# Patient Record
Sex: FEMALE | Race: BLACK OR AFRICAN AMERICAN | Employment: OTHER | URBAN - METROPOLITAN AREA
[De-identification: names, ages, dates, MRNs, and addresses within clinical notes are randomized per-mention and may not be internally consistent; named-entity substitution may affect disease eponyms.]

---

## 2017-03-29 ENCOUNTER — HOSPITAL ENCOUNTER (OUTPATIENT)
Age: 72
Discharge: HOME | End: 2017-03-29
Payer: MEDICARE

## 2017-03-29 LAB
ANION GAP SERPL CALCULATED.3IONS-SCNC: 11 MMOL/L (ref 9–17)
BUN BLDV-MCNC: 28 MG/DL (ref 8–23)
BUN/CREAT BLD: 15 (ref 9–20)
CALCIUM SERPL-MCNC: 9.7 MG/DL (ref 8.6–10.4)
CHLORIDE BLD-SCNC: 99 MMOL/L (ref 98–107)
CO2: 27 MMOL/L (ref 20–31)
CREAT SERPL-MCNC: 1.86 MG/DL (ref 0.5–0.9)
GFR AFRICAN AMERICAN: 32 ML/MIN
GFR NON-AFRICAN AMERICAN: 27 ML/MIN
GFR SERPL CREATININE-BSD FRML MDRD: ABNORMAL ML/MIN/{1.73_M2}
GFR SERPL CREATININE-BSD FRML MDRD: ABNORMAL ML/MIN/{1.73_M2}
GLUCOSE BLD-MCNC: 198 MG/DL (ref 70–99)
POTASSIUM SERPL-SCNC: 4.8 MMOL/L (ref 3.7–5.3)
SODIUM BLD-SCNC: 137 MMOL/L (ref 135–144)

## 2017-03-29 PROCEDURE — 36415 COLL VENOUS BLD VENIPUNCTURE: CPT

## 2017-03-29 PROCEDURE — 80048 BASIC METABOLIC PNL TOTAL CA: CPT

## 2017-07-24 ENCOUNTER — HOSPITAL ENCOUNTER (OUTPATIENT)
Age: 72
Discharge: HOME | End: 2017-07-24
Payer: MEDICARE

## 2017-07-24 LAB
ANION GAP SERPL CALCULATED.3IONS-SCNC: 16 MMOL/L (ref 9–17)
BUN BLDV-MCNC: 54 MG/DL (ref 8–23)
BUN/CREAT BLD: 27 (ref 9–20)
CALCIUM SERPL-MCNC: 10.3 MG/DL (ref 8.6–10.4)
CHLORIDE BLD-SCNC: 97 MMOL/L (ref 98–107)
CO2: 26 MMOL/L (ref 20–31)
CREAT SERPL-MCNC: 1.97 MG/DL (ref 0.5–0.9)
GFR AFRICAN AMERICAN: 30 ML/MIN
GFR NON-AFRICAN AMERICAN: 25 ML/MIN
GFR SERPL CREATININE-BSD FRML MDRD: ABNORMAL ML/MIN/{1.73_M2}
GFR SERPL CREATININE-BSD FRML MDRD: ABNORMAL ML/MIN/{1.73_M2}
GLUCOSE BLD-MCNC: 352 MG/DL (ref 70–99)
POTASSIUM SERPL-SCNC: 4.3 MMOL/L (ref 3.7–5.3)
SODIUM BLD-SCNC: 139 MMOL/L (ref 135–144)

## 2017-07-24 PROCEDURE — 36415 COLL VENOUS BLD VENIPUNCTURE: CPT

## 2017-07-24 PROCEDURE — 80048 BASIC METABOLIC PNL TOTAL CA: CPT

## 2017-08-17 ENCOUNTER — HOSPITAL ENCOUNTER (INPATIENT)
Age: 72
LOS: 5 days | Discharge: HOME | DRG: 602 | End: 2017-08-22
Attending: INTERNAL MEDICINE | Admitting: INTERNAL MEDICINE
Payer: MEDICARE

## 2017-08-17 PROBLEM — L03.115 CELLULITIS OF RIGHT LOWER LEG: Status: ACTIVE | Noted: 2017-08-17

## 2017-08-17 PROBLEM — R60.0 BILATERAL EDEMA OF LOWER EXTREMITY: Status: ACTIVE | Noted: 2017-08-17

## 2017-08-17 PROBLEM — E11.42 DIABETIC POLYNEUROPATHY ASSOCIATED WITH TYPE 2 DIABETES MELLITUS (HCC): Status: ACTIVE | Noted: 2017-08-17

## 2017-08-17 PROBLEM — L97.919 ULCER OF RIGHT LOWER LEG (HCC): Status: ACTIVE | Noted: 2017-08-17

## 2017-08-17 PROBLEM — N18.30 CHRONIC RENAL FAILURE, STAGE 3 (MODERATE) (HCC): Status: ACTIVE | Noted: 2017-08-17

## 2017-08-17 LAB
ABSOLUTE EOS #: 0.1 K/UL (ref 0–0.4)
ABSOLUTE LYMPH #: 1.7 K/UL (ref 1–4.8)
ABSOLUTE MONO #: 0.4 K/UL (ref 0.2–0.8)
ALBUMIN SERPL-MCNC: 4 G/DL (ref 3.5–5.2)
ANION GAP SERPL CALCULATED.3IONS-SCNC: 18 MMOL/L (ref 9–17)
BASOPHILS # BLD: 1 %
BASOPHILS ABSOLUTE: 0 K/UL (ref 0–0.2)
BNP INTERPRETATION: ABNORMAL
BUN BLDV-MCNC: 52 MG/DL (ref 8–23)
CHLORIDE BLD-SCNC: 99 MMOL/L (ref 98–107)
CO2: 25 MMOL/L (ref 20–31)
CREAT SERPL-MCNC: 1.98 MG/DL (ref 0.5–0.9)
DIFFERENTIAL TYPE: ABNORMAL
EOSINOPHILS RELATIVE PERCENT: 3 %
GFR AFRICAN AMERICAN: 30 ML/MIN
GFR NON-AFRICAN AMERICAN: 25 ML/MIN
GFR SERPL CREATININE-BSD FRML MDRD: ABNORMAL ML/MIN/{1.73_M2}
GFR SERPL CREATININE-BSD FRML MDRD: ABNORMAL ML/MIN/{1.73_M2}
GLUCOSE BLD-MCNC: 109 MG/DL (ref 65–105)
HCT VFR BLD CALC: 34.3 % (ref 36–46)
HEMOGLOBIN: 11.6 G/DL (ref 12–16)
LYMPHOCYTES # BLD: 31 %
MCH RBC QN AUTO: 29.5 PG (ref 26–34)
MCHC RBC AUTO-ENTMCNC: 33.8 G/DL (ref 31–37)
MCV RBC AUTO: 87.1 FL (ref 80–100)
MONOCYTES # BLD: 7 %
PDW BLD-RTO: 13.7 % (ref 11.5–14.5)
PLATELET # BLD: 142 K/UL (ref 130–400)
PLATELET ESTIMATE: ABNORMAL
PMV BLD AUTO: 8.3 FL (ref 6–12)
POTASSIUM SERPL-SCNC: 3.7 MMOL/L (ref 3.7–5.3)
PRO-BNP: 528 PG/ML
RBC # BLD: 3.94 M/UL (ref 4–5.2)
RBC # BLD: ABNORMAL 10*6/UL
SEG NEUTROPHILS: 58 %
SEGMENTED NEUTROPHILS ABSOLUTE COUNT: 3.2 K/UL (ref 1.8–7.7)
SODIUM BLD-SCNC: 142 MMOL/L (ref 135–144)
THYROXINE, FREE: 1.22 NG/DL (ref 0.93–1.7)
TSH SERPL DL<=0.05 MIU/L-ACNC: 2.5 MIU/L (ref 0.3–5)
WBC # BLD: 5.4 K/UL (ref 3.5–11)
WBC # BLD: ABNORMAL 10*3/UL

## 2017-08-17 PROCEDURE — 6360000002 HC RX W HCPCS: Performed by: INTERNAL MEDICINE

## 2017-08-17 PROCEDURE — 82947 ASSAY GLUCOSE BLOOD QUANT: CPT

## 2017-08-17 PROCEDURE — 36415 COLL VENOUS BLD VENIPUNCTURE: CPT

## 2017-08-17 PROCEDURE — 2580000003 HC RX 258: Performed by: INTERNAL MEDICINE

## 2017-08-17 PROCEDURE — 6370000000 HC RX 637 (ALT 250 FOR IP): Performed by: INTERNAL MEDICINE

## 2017-08-17 PROCEDURE — 87070 CULTURE OTHR SPECIMN AEROBIC: CPT

## 2017-08-17 PROCEDURE — 82040 ASSAY OF SERUM ALBUMIN: CPT

## 2017-08-17 PROCEDURE — 2500000003 HC RX 250 WO HCPCS: Performed by: INTERNAL MEDICINE

## 2017-08-17 PROCEDURE — 80051 ELECTROLYTE PANEL: CPT

## 2017-08-17 PROCEDURE — 87040 BLOOD CULTURE FOR BACTERIA: CPT

## 2017-08-17 PROCEDURE — 84439 ASSAY OF FREE THYROXINE: CPT

## 2017-08-17 PROCEDURE — 84520 ASSAY OF UREA NITROGEN: CPT

## 2017-08-17 PROCEDURE — 85025 COMPLETE CBC W/AUTO DIFF WBC: CPT

## 2017-08-17 PROCEDURE — 2060000000 HC ICU INTERMEDIATE R&B

## 2017-08-17 PROCEDURE — 83880 ASSAY OF NATRIURETIC PEPTIDE: CPT

## 2017-08-17 PROCEDURE — 84443 ASSAY THYROID STIM HORMONE: CPT

## 2017-08-17 PROCEDURE — 87205 SMEAR GRAM STAIN: CPT

## 2017-08-17 PROCEDURE — 83036 HEMOGLOBIN GLYCOSYLATED A1C: CPT

## 2017-08-17 PROCEDURE — 82565 ASSAY OF CREATININE: CPT

## 2017-08-17 RX ORDER — ASPIRIN 81 MG/1
81 TABLET, CHEWABLE ORAL DAILY
Status: DISCONTINUED | OUTPATIENT
Start: 2017-08-18 | End: 2017-08-23 | Stop reason: HOSPADM

## 2017-08-17 RX ORDER — OMEPRAZOLE 20 MG/1
40 CAPSULE, DELAYED RELEASE ORAL DAILY
Status: DISCONTINUED | OUTPATIENT
Start: 2017-08-18 | End: 2017-08-17 | Stop reason: CLARIF

## 2017-08-17 RX ORDER — BUMETANIDE 0.25 MG/ML
1 INJECTION, SOLUTION INTRAMUSCULAR; INTRAVENOUS 2 TIMES DAILY
Status: DISCONTINUED | OUTPATIENT
Start: 2017-08-17 | End: 2017-08-19

## 2017-08-17 RX ORDER — POTASSIUM CHLORIDE 20 MEQ/1
20 TABLET, EXTENDED RELEASE ORAL DAILY
Status: ON HOLD | COMMUNITY
End: 2017-08-22 | Stop reason: HOSPADM

## 2017-08-17 RX ORDER — CARVEDILOL 6.25 MG/1
6.25 TABLET ORAL 2 TIMES DAILY WITH MEALS
Status: DISCONTINUED | OUTPATIENT
Start: 2017-08-18 | End: 2017-08-23 | Stop reason: HOSPADM

## 2017-08-17 RX ORDER — BUMETANIDE 1 MG/1
2 TABLET ORAL DAILY
Status: ON HOLD | COMMUNITY
End: 2017-08-18 | Stop reason: DRUGHIGH

## 2017-08-17 RX ORDER — LANOLIN ALCOHOL/MO/W.PET/CERES
325 CREAM (GRAM) TOPICAL
Status: DISCONTINUED | OUTPATIENT
Start: 2017-08-18 | End: 2017-08-23 | Stop reason: HOSPADM

## 2017-08-17 RX ORDER — NICOTINE POLACRILEX 4 MG
15 LOZENGE BUCCAL PRN
Status: DISCONTINUED | OUTPATIENT
Start: 2017-08-17 | End: 2017-08-23 | Stop reason: HOSPADM

## 2017-08-17 RX ORDER — SPIRONOLACTONE 25 MG/1
50 TABLET ORAL DAILY
Status: DISCONTINUED | OUTPATIENT
Start: 2017-08-18 | End: 2017-08-23 | Stop reason: HOSPADM

## 2017-08-17 RX ORDER — HYDRALAZINE HYDROCHLORIDE 50 MG/1
50 TABLET, FILM COATED ORAL 2 TIMES DAILY
Status: DISCONTINUED | OUTPATIENT
Start: 2017-08-17 | End: 2017-08-23 | Stop reason: HOSPADM

## 2017-08-17 RX ORDER — HYDRALAZINE HYDROCHLORIDE 50 MG/1
50 TABLET, FILM COATED ORAL 2 TIMES DAILY
Status: ON HOLD | COMMUNITY
End: 2017-08-18 | Stop reason: DRUGHIGH

## 2017-08-17 RX ORDER — ISOSORBIDE MONONITRATE 30 MG/1
30 TABLET, EXTENDED RELEASE ORAL DAILY
COMMUNITY

## 2017-08-17 RX ORDER — GLIPIZIDE 2.5 MG/1
2.5 TABLET, EXTENDED RELEASE ORAL DAILY
Status: ON HOLD | COMMUNITY
End: 2017-08-18 | Stop reason: CLARIF

## 2017-08-17 RX ORDER — DEXTROSE MONOHYDRATE 25 G/50ML
12.5 INJECTION, SOLUTION INTRAVENOUS PRN
Status: DISCONTINUED | OUTPATIENT
Start: 2017-08-17 | End: 2017-08-23 | Stop reason: HOSPADM

## 2017-08-17 RX ORDER — DULOXETIN HYDROCHLORIDE 30 MG/1
30 CAPSULE, DELAYED RELEASE ORAL DAILY
COMMUNITY

## 2017-08-17 RX ORDER — DEXTROSE MONOHYDRATE 50 MG/ML
100 INJECTION, SOLUTION INTRAVENOUS PRN
Status: DISCONTINUED | OUTPATIENT
Start: 2017-08-17 | End: 2017-08-23 | Stop reason: HOSPADM

## 2017-08-17 RX ORDER — DULOXETIN HYDROCHLORIDE 30 MG/1
30 CAPSULE, DELAYED RELEASE ORAL DAILY
Status: DISCONTINUED | OUTPATIENT
Start: 2017-08-18 | End: 2017-08-23 | Stop reason: HOSPADM

## 2017-08-17 RX ORDER — ATORVASTATIN CALCIUM 40 MG/1
40 TABLET, FILM COATED ORAL NIGHTLY
Status: DISCONTINUED | OUTPATIENT
Start: 2017-08-17 | End: 2017-08-23 | Stop reason: HOSPADM

## 2017-08-17 RX ORDER — ISOSORBIDE MONONITRATE 30 MG/1
30 TABLET, EXTENDED RELEASE ORAL DAILY
Status: DISCONTINUED | OUTPATIENT
Start: 2017-08-18 | End: 2017-08-23 | Stop reason: HOSPADM

## 2017-08-17 RX ORDER — INSULIN GLARGINE 100 [IU]/ML
30 INJECTION, SOLUTION SUBCUTANEOUS
Status: DISCONTINUED | OUTPATIENT
Start: 2017-08-18 | End: 2017-08-23 | Stop reason: HOSPADM

## 2017-08-17 RX ORDER — HYDROCODONE BITARTRATE AND ACETAMINOPHEN 5; 325 MG/1; MG/1
1 TABLET ORAL EVERY 8 HOURS PRN
Status: DISCONTINUED | OUTPATIENT
Start: 2017-08-17 | End: 2017-08-23 | Stop reason: HOSPADM

## 2017-08-17 RX ORDER — PANTOPRAZOLE SODIUM 40 MG/1
40 TABLET, DELAYED RELEASE ORAL
Status: DISCONTINUED | OUTPATIENT
Start: 2017-08-18 | End: 2017-08-23 | Stop reason: HOSPADM

## 2017-08-17 RX ORDER — ATORVASTATIN CALCIUM 40 MG/1
40 TABLET, FILM COATED ORAL DAILY
COMMUNITY
End: 2019-12-06

## 2017-08-17 RX ORDER — METOLAZONE 5 MG/1
5 TABLET ORAL DAILY
Status: ON HOLD | COMMUNITY
End: 2017-08-22 | Stop reason: HOSPADM

## 2017-08-17 RX ADMIN — HYDRALAZINE HYDROCHLORIDE 50 MG: 50 TABLET ORAL at 22:44

## 2017-08-17 RX ADMIN — BUMETANIDE 1 MG: 0.25 INJECTION INTRAMUSCULAR; INTRAVENOUS at 23:45

## 2017-08-17 RX ADMIN — HYDROCODONE BITARTRATE AND ACETAMINOPHEN 1 TABLET: 5; 325 TABLET ORAL at 23:35

## 2017-08-17 RX ADMIN — VANCOMYCIN HYDROCHLORIDE 1500 MG: 1 INJECTION, POWDER, LYOPHILIZED, FOR SOLUTION INTRAVENOUS at 23:52

## 2017-08-17 RX ADMIN — ATORVASTATIN CALCIUM 40 MG: 40 TABLET, FILM COATED ORAL at 22:45

## 2017-08-17 ASSESSMENT — PAIN DESCRIPTION - PAIN TYPE
TYPE: ACUTE PAIN
TYPE: ACUTE PAIN

## 2017-08-17 ASSESSMENT — PAIN DESCRIPTION - FREQUENCY: FREQUENCY: CONTINUOUS

## 2017-08-17 ASSESSMENT — PAIN DESCRIPTION - ORIENTATION
ORIENTATION: RIGHT;LOWER
ORIENTATION: RIGHT

## 2017-08-17 ASSESSMENT — PAIN DESCRIPTION - LOCATION
LOCATION: ANKLE
LOCATION: ANKLE;LEG

## 2017-08-17 ASSESSMENT — PAIN SCALES - GENERAL
PAINLEVEL_OUTOF10: 7
PAINLEVEL_OUTOF10: 7

## 2017-08-18 PROBLEM — I50.32 CHRONIC DIASTOLIC HEART FAILURE (HCC): Status: ACTIVE | Noted: 2017-08-18

## 2017-08-18 LAB
ANION GAP SERPL CALCULATED.3IONS-SCNC: 13 MMOL/L (ref 9–17)
BUN BLDV-MCNC: 51 MG/DL (ref 8–23)
CHLORIDE BLD-SCNC: 99 MMOL/L (ref 98–107)
CO2: 30 MMOL/L (ref 20–31)
CREAT SERPL-MCNC: 1.98 MG/DL (ref 0.5–0.9)
CREATININE URINE: 92.9 MG/DL (ref 28–217)
ESTIMATED AVERAGE GLUCOSE: 235 MG/DL
GFR AFRICAN AMERICAN: 30 ML/MIN
GFR NON-AFRICAN AMERICAN: 25 ML/MIN
GFR SERPL CREATININE-BSD FRML MDRD: ABNORMAL ML/MIN/{1.73_M2}
GFR SERPL CREATININE-BSD FRML MDRD: ABNORMAL ML/MIN/{1.73_M2}
GLUCOSE BLD-MCNC: 170 MG/DL (ref 65–105)
GLUCOSE BLD-MCNC: 185 MG/DL (ref 65–105)
GLUCOSE BLD-MCNC: 187 MG/DL (ref 65–105)
GLUCOSE BLD-MCNC: 212 MG/DL (ref 65–105)
HBA1C MFR BLD: 9.8 % (ref 4–6)
LV EF: 60 %
LVEF MODALITY: NORMAL
MICROALBUMIN/CREAT 24H UR: 26 MG/L
MICROALBUMIN/CREAT UR-RTO: 28 MCG/MG CREAT
POTASSIUM SERPL-SCNC: 3.9 MMOL/L (ref 3.7–5.3)
SODIUM BLD-SCNC: 142 MMOL/L (ref 135–144)

## 2017-08-18 PROCEDURE — 36415 COLL VENOUS BLD VENIPUNCTURE: CPT

## 2017-08-18 PROCEDURE — 82565 ASSAY OF CREATININE: CPT

## 2017-08-18 PROCEDURE — 80051 ELECTROLYTE PANEL: CPT

## 2017-08-18 PROCEDURE — 99211 OFF/OP EST MAY X REQ PHY/QHP: CPT

## 2017-08-18 PROCEDURE — 93970 EXTREMITY STUDY: CPT

## 2017-08-18 PROCEDURE — 2060000000 HC ICU INTERMEDIATE R&B

## 2017-08-18 PROCEDURE — 6370000000 HC RX 637 (ALT 250 FOR IP): Performed by: INTERNAL MEDICINE

## 2017-08-18 PROCEDURE — 82043 UR ALBUMIN QUANTITATIVE: CPT

## 2017-08-18 PROCEDURE — 82570 ASSAY OF URINE CREATININE: CPT

## 2017-08-18 PROCEDURE — 82947 ASSAY GLUCOSE BLOOD QUANT: CPT

## 2017-08-18 PROCEDURE — 2580000003 HC RX 258: Performed by: INTERNAL MEDICINE

## 2017-08-18 PROCEDURE — 2500000003 HC RX 250 WO HCPCS: Performed by: INTERNAL MEDICINE

## 2017-08-18 PROCEDURE — 93306 TTE W/DOPPLER COMPLETE: CPT

## 2017-08-18 PROCEDURE — 6360000002 HC RX W HCPCS: Performed by: INTERNAL MEDICINE

## 2017-08-18 PROCEDURE — 84520 ASSAY OF UREA NITROGEN: CPT

## 2017-08-18 RX ORDER — CARVEDILOL 12.5 MG/1
12.5 TABLET ORAL 2 TIMES DAILY
Status: ON HOLD | COMMUNITY
End: 2018-10-30 | Stop reason: HOSPADM

## 2017-08-18 RX ORDER — GABAPENTIN 100 MG/1
100 CAPSULE ORAL 3 TIMES DAILY
Status: DISCONTINUED | OUTPATIENT
Start: 2017-08-18 | End: 2017-08-23 | Stop reason: HOSPADM

## 2017-08-18 RX ORDER — BUMETANIDE 2 MG/1
2 TABLET ORAL DAILY
Status: ON HOLD | COMMUNITY
End: 2017-08-22

## 2017-08-18 RX ORDER — GLIPIZIDE 5 MG/1
5 TABLET ORAL DAILY
COMMUNITY

## 2017-08-18 RX ORDER — HYDRALAZINE HYDROCHLORIDE 100 MG/1
100 TABLET, FILM COATED ORAL 2 TIMES DAILY
Status: ON HOLD | COMMUNITY
End: 2018-02-12 | Stop reason: HOSPADM

## 2017-08-18 RX ADMIN — INSULIN GLARGINE 30 UNITS: 100 INJECTION, SOLUTION SUBCUTANEOUS at 09:04

## 2017-08-18 RX ADMIN — BUMETANIDE 1 MG: 0.25 INJECTION INTRAMUSCULAR; INTRAVENOUS at 10:57

## 2017-08-18 RX ADMIN — ASPIRIN 81 MG 81 MG: 81 TABLET ORAL at 10:57

## 2017-08-18 RX ADMIN — ISOSORBIDE MONONITRATE 30 MG: 30 TABLET ORAL at 10:55

## 2017-08-18 RX ADMIN — ATORVASTATIN CALCIUM 40 MG: 40 TABLET, FILM COATED ORAL at 23:13

## 2017-08-18 RX ADMIN — HYDRALAZINE HYDROCHLORIDE 50 MG: 50 TABLET ORAL at 23:13

## 2017-08-18 RX ADMIN — HYDRALAZINE HYDROCHLORIDE 50 MG: 50 TABLET ORAL at 10:55

## 2017-08-18 RX ADMIN — FERROUS SULFATE TAB EC 325 MG (65 MG FE EQUIVALENT) 325 MG: 325 (65 FE) TABLET DELAYED RESPONSE at 10:57

## 2017-08-18 RX ADMIN — HYDROCODONE BITARTRATE AND ACETAMINOPHEN 1 TABLET: 5; 325 TABLET ORAL at 11:12

## 2017-08-18 RX ADMIN — SPIRONOLACTONE 50 MG: 25 TABLET, FILM COATED ORAL at 10:56

## 2017-08-18 RX ADMIN — CARVEDILOL 6.25 MG: 6.25 TABLET, FILM COATED ORAL at 10:56

## 2017-08-18 RX ADMIN — VANCOMYCIN HYDROCHLORIDE 1500 MG: 1 INJECTION, POWDER, LYOPHILIZED, FOR SOLUTION INTRAVENOUS at 23:26

## 2017-08-18 RX ADMIN — BUMETANIDE 1 MG: 0.25 INJECTION INTRAMUSCULAR; INTRAVENOUS at 23:13

## 2017-08-18 RX ADMIN — Medication 2 UNITS: at 09:04

## 2017-08-18 RX ADMIN — HYDROCODONE BITARTRATE AND ACETAMINOPHEN 1 TABLET: 5; 325 TABLET ORAL at 23:26

## 2017-08-18 RX ADMIN — DULOXETINE HYDROCHLORIDE 30 MG: 30 CAPSULE, DELAYED RELEASE ORAL at 10:55

## 2017-08-18 RX ADMIN — GABAPENTIN 100 MG: 100 CAPSULE ORAL at 17:10

## 2017-08-18 RX ADMIN — Medication 4 UNITS: at 17:12

## 2017-08-18 RX ADMIN — CARVEDILOL 6.25 MG: 6.25 TABLET, FILM COATED ORAL at 17:11

## 2017-08-18 RX ADMIN — GABAPENTIN 100 MG: 100 CAPSULE ORAL at 23:13

## 2017-08-18 RX ADMIN — INSULIN LISPRO 1 UNITS: 100 INJECTION, SOLUTION INTRAVENOUS; SUBCUTANEOUS at 23:13

## 2017-08-18 RX ADMIN — PANTOPRAZOLE SODIUM 40 MG: 40 TABLET, DELAYED RELEASE ORAL at 06:11

## 2017-08-18 ASSESSMENT — PAIN SCALES - GENERAL
PAINLEVEL_OUTOF10: 8
PAINLEVEL_OUTOF10: 8
PAINLEVEL_OUTOF10: 5
PAINLEVEL_OUTOF10: 3
PAINLEVEL_OUTOF10: 1

## 2017-08-19 LAB
ANION GAP SERPL CALCULATED.3IONS-SCNC: 15 MMOL/L (ref 9–17)
BUN BLDV-MCNC: 52 MG/DL (ref 8–23)
CHLORIDE BLD-SCNC: 97 MMOL/L (ref 98–107)
CO2: 27 MMOL/L (ref 20–31)
CREAT SERPL-MCNC: 2.24 MG/DL (ref 0.5–0.9)
GFR AFRICAN AMERICAN: 26 ML/MIN
GFR NON-AFRICAN AMERICAN: 22 ML/MIN
GFR SERPL CREATININE-BSD FRML MDRD: ABNORMAL ML/MIN/{1.73_M2}
GFR SERPL CREATININE-BSD FRML MDRD: ABNORMAL ML/MIN/{1.73_M2}
GLUCOSE BLD-MCNC: 144 MG/DL (ref 65–105)
GLUCOSE BLD-MCNC: 177 MG/DL (ref 65–105)
GLUCOSE BLD-MCNC: 236 MG/DL (ref 65–105)
GLUCOSE BLD-MCNC: 80 MG/DL (ref 65–105)
POTASSIUM SERPL-SCNC: 3.7 MMOL/L (ref 3.7–5.3)
SODIUM BLD-SCNC: 139 MMOL/L (ref 135–144)
VANCOMYCIN TROUGH DATE LAST DOSE: NORMAL
VANCOMYCIN TROUGH DOSE AMOUNT: NORMAL
VANCOMYCIN TROUGH TIME LAST DOSE: NORMAL
VANCOMYCIN TROUGH: 19.2 UG/ML (ref 10–20)

## 2017-08-19 PROCEDURE — 80202 ASSAY OF VANCOMYCIN: CPT

## 2017-08-19 PROCEDURE — 2500000003 HC RX 250 WO HCPCS: Performed by: INTERNAL MEDICINE

## 2017-08-19 PROCEDURE — 84520 ASSAY OF UREA NITROGEN: CPT

## 2017-08-19 PROCEDURE — 2060000000 HC ICU INTERMEDIATE R&B

## 2017-08-19 PROCEDURE — 6370000000 HC RX 637 (ALT 250 FOR IP): Performed by: INTERNAL MEDICINE

## 2017-08-19 PROCEDURE — 80051 ELECTROLYTE PANEL: CPT

## 2017-08-19 PROCEDURE — 82565 ASSAY OF CREATININE: CPT

## 2017-08-19 PROCEDURE — 82947 ASSAY GLUCOSE BLOOD QUANT: CPT

## 2017-08-19 PROCEDURE — 94762 N-INVAS EAR/PLS OXIMTRY CONT: CPT

## 2017-08-19 PROCEDURE — 36415 COLL VENOUS BLD VENIPUNCTURE: CPT

## 2017-08-19 RX ADMIN — FERROUS SULFATE TAB EC 325 MG (65 MG FE EQUIVALENT) 325 MG: 325 (65 FE) TABLET DELAYED RESPONSE at 10:17

## 2017-08-19 RX ADMIN — PANTOPRAZOLE SODIUM 40 MG: 40 TABLET, DELAYED RELEASE ORAL at 06:53

## 2017-08-19 RX ADMIN — CARVEDILOL 6.25 MG: 6.25 TABLET, FILM COATED ORAL at 10:18

## 2017-08-19 RX ADMIN — Medication 2 UNITS: at 10:19

## 2017-08-19 RX ADMIN — INSULIN LISPRO 2 UNITS: 100 INJECTION, SOLUTION INTRAVENOUS; SUBCUTANEOUS at 22:47

## 2017-08-19 RX ADMIN — HYDRALAZINE HYDROCHLORIDE 50 MG: 50 TABLET ORAL at 10:17

## 2017-08-19 RX ADMIN — Medication 2 UNITS: at 12:38

## 2017-08-19 RX ADMIN — SPIRONOLACTONE 50 MG: 25 TABLET, FILM COATED ORAL at 10:19

## 2017-08-19 RX ADMIN — ISOSORBIDE MONONITRATE 30 MG: 30 TABLET ORAL at 10:17

## 2017-08-19 RX ADMIN — BUMETANIDE 1 MG: 0.25 INJECTION INTRAMUSCULAR; INTRAVENOUS at 10:17

## 2017-08-19 RX ADMIN — GABAPENTIN 100 MG: 100 CAPSULE ORAL at 10:17

## 2017-08-19 RX ADMIN — GABAPENTIN 100 MG: 100 CAPSULE ORAL at 22:47

## 2017-08-19 RX ADMIN — INSULIN GLARGINE 30 UNITS: 100 INJECTION, SOLUTION SUBCUTANEOUS at 06:53

## 2017-08-19 RX ADMIN — ATORVASTATIN CALCIUM 40 MG: 40 TABLET, FILM COATED ORAL at 22:47

## 2017-08-19 RX ADMIN — CARVEDILOL 6.25 MG: 6.25 TABLET, FILM COATED ORAL at 16:55

## 2017-08-19 RX ADMIN — HYDRALAZINE HYDROCHLORIDE 50 MG: 50 TABLET ORAL at 22:47

## 2017-08-19 RX ADMIN — DULOXETINE HYDROCHLORIDE 30 MG: 30 CAPSULE, DELAYED RELEASE ORAL at 10:18

## 2017-08-19 RX ADMIN — HYDROCODONE BITARTRATE AND ACETAMINOPHEN 1 TABLET: 5; 325 TABLET ORAL at 22:47

## 2017-08-19 RX ADMIN — GABAPENTIN 100 MG: 100 CAPSULE ORAL at 14:22

## 2017-08-19 RX ADMIN — ASPIRIN 81 MG 81 MG: 81 TABLET ORAL at 10:18

## 2017-08-19 ASSESSMENT — PAIN SCALES - GENERAL
PAINLEVEL_OUTOF10: 6
PAINLEVEL_OUTOF10: 1
PAINLEVEL_OUTOF10: 7
PAINLEVEL_OUTOF10: 0
PAINLEVEL_OUTOF10: 1

## 2017-08-19 ASSESSMENT — PAIN DESCRIPTION - ONSET
ONSET: ON-GOING

## 2017-08-19 ASSESSMENT — ENCOUNTER SYMPTOMS
EYES NEGATIVE: 1
RESPIRATORY NEGATIVE: 1
GASTROINTESTINAL NEGATIVE: 1

## 2017-08-19 ASSESSMENT — PAIN DESCRIPTION - PROGRESSION
CLINICAL_PROGRESSION: NOT CHANGED

## 2017-08-19 ASSESSMENT — PAIN DESCRIPTION - ORIENTATION: ORIENTATION: RIGHT;LEFT

## 2017-08-19 ASSESSMENT — PAIN DESCRIPTION - LOCATION: LOCATION: LEG;FOOT

## 2017-08-19 ASSESSMENT — PAIN DESCRIPTION - DESCRIPTORS: DESCRIPTORS: ACHING;PRESSURE

## 2017-08-19 ASSESSMENT — PAIN DESCRIPTION - PAIN TYPE: TYPE: ACUTE PAIN;CHRONIC PAIN

## 2017-08-19 ASSESSMENT — PAIN DESCRIPTION - FREQUENCY: FREQUENCY: CONTINUOUS

## 2017-08-20 ENCOUNTER — APPOINTMENT (OUTPATIENT)
Dept: GENERAL RADIOLOGY | Age: 72
DRG: 602 | End: 2017-08-20
Attending: INTERNAL MEDICINE
Payer: MEDICARE

## 2017-08-20 LAB
ANION GAP SERPL CALCULATED.3IONS-SCNC: 13 MMOL/L (ref 9–17)
BUN BLDV-MCNC: 55 MG/DL (ref 8–23)
CHLORIDE BLD-SCNC: 96 MMOL/L (ref 98–107)
CO2: 29 MMOL/L (ref 20–31)
CREAT SERPL-MCNC: 2.16 MG/DL (ref 0.5–0.9)
CULTURE: ABNORMAL
DIRECT EXAM: ABNORMAL
DIRECT EXAM: ABNORMAL
GFR AFRICAN AMERICAN: 27 ML/MIN
GFR NON-AFRICAN AMERICAN: 22 ML/MIN
GFR SERPL CREATININE-BSD FRML MDRD: ABNORMAL ML/MIN/{1.73_M2}
GFR SERPL CREATININE-BSD FRML MDRD: ABNORMAL ML/MIN/{1.73_M2}
GLUCOSE BLD-MCNC: 101 MG/DL (ref 65–105)
GLUCOSE BLD-MCNC: 142 MG/DL (ref 65–105)
GLUCOSE BLD-MCNC: 161 MG/DL (ref 65–105)
GLUCOSE BLD-MCNC: 206 MG/DL (ref 65–105)
Lab: ABNORMAL
POTASSIUM SERPL-SCNC: 3.7 MMOL/L (ref 3.7–5.3)
SODIUM BLD-SCNC: 138 MMOL/L (ref 135–144)
SPECIMEN DESCRIPTION: ABNORMAL
SPECIMEN DESCRIPTION: ABNORMAL
STATUS: ABNORMAL

## 2017-08-20 PROCEDURE — 71010 XR CHEST PORTABLE: CPT

## 2017-08-20 PROCEDURE — 80051 ELECTROLYTE PANEL: CPT

## 2017-08-20 PROCEDURE — 2060000000 HC ICU INTERMEDIATE R&B

## 2017-08-20 PROCEDURE — 36415 COLL VENOUS BLD VENIPUNCTURE: CPT

## 2017-08-20 PROCEDURE — 6370000000 HC RX 637 (ALT 250 FOR IP): Performed by: INTERNAL MEDICINE

## 2017-08-20 PROCEDURE — 2580000003 HC RX 258: Performed by: INTERNAL MEDICINE

## 2017-08-20 PROCEDURE — 84520 ASSAY OF UREA NITROGEN: CPT

## 2017-08-20 PROCEDURE — 82565 ASSAY OF CREATININE: CPT

## 2017-08-20 PROCEDURE — 6360000002 HC RX W HCPCS: Performed by: INTERNAL MEDICINE

## 2017-08-20 PROCEDURE — 82947 ASSAY GLUCOSE BLOOD QUANT: CPT

## 2017-08-20 RX ADMIN — DULOXETINE HYDROCHLORIDE 30 MG: 30 CAPSULE, DELAYED RELEASE ORAL at 09:01

## 2017-08-20 RX ADMIN — HYDRALAZINE HYDROCHLORIDE 50 MG: 50 TABLET ORAL at 09:00

## 2017-08-20 RX ADMIN — HYDRALAZINE HYDROCHLORIDE 50 MG: 50 TABLET ORAL at 20:23

## 2017-08-20 RX ADMIN — GABAPENTIN 100 MG: 100 CAPSULE ORAL at 16:08

## 2017-08-20 RX ADMIN — GABAPENTIN 100 MG: 100 CAPSULE ORAL at 09:00

## 2017-08-20 RX ADMIN — GABAPENTIN 100 MG: 100 CAPSULE ORAL at 20:23

## 2017-08-20 RX ADMIN — INSULIN LISPRO 2 UNITS: 100 INJECTION, SOLUTION INTRAVENOUS; SUBCUTANEOUS at 23:54

## 2017-08-20 RX ADMIN — CARVEDILOL 6.25 MG: 6.25 TABLET, FILM COATED ORAL at 20:23

## 2017-08-20 RX ADMIN — INSULIN GLARGINE 30 UNITS: 100 INJECTION, SOLUTION SUBCUTANEOUS at 06:30

## 2017-08-20 RX ADMIN — VANCOMYCIN HYDROCHLORIDE 1500 MG: 1 INJECTION, POWDER, LYOPHILIZED, FOR SOLUTION INTRAVENOUS at 00:16

## 2017-08-20 RX ADMIN — ISOSORBIDE MONONITRATE 30 MG: 30 TABLET ORAL at 09:01

## 2017-08-20 RX ADMIN — PANTOPRAZOLE SODIUM 40 MG: 40 TABLET, DELAYED RELEASE ORAL at 06:30

## 2017-08-20 RX ADMIN — Medication 2 UNITS: at 09:05

## 2017-08-20 RX ADMIN — CARVEDILOL 6.25 MG: 6.25 TABLET, FILM COATED ORAL at 07:40

## 2017-08-20 RX ADMIN — ASPIRIN 81 MG 81 MG: 81 TABLET ORAL at 09:02

## 2017-08-20 RX ADMIN — ATORVASTATIN CALCIUM 40 MG: 40 TABLET, FILM COATED ORAL at 20:23

## 2017-08-20 RX ADMIN — FERROUS SULFATE TAB EC 325 MG (65 MG FE EQUIVALENT) 325 MG: 325 (65 FE) TABLET DELAYED RESPONSE at 07:40

## 2017-08-20 RX ADMIN — SPIRONOLACTONE 50 MG: 25 TABLET, FILM COATED ORAL at 09:01

## 2017-08-20 ASSESSMENT — PAIN SCALES - GENERAL
PAINLEVEL_OUTOF10: 0

## 2017-08-21 LAB
ABSOLUTE EOS #: 0.2 K/UL (ref 0–0.4)
ABSOLUTE LYMPH #: 1.9 K/UL (ref 1–4.8)
ABSOLUTE MONO #: 0.6 K/UL (ref 0.2–0.8)
ANION GAP SERPL CALCULATED.3IONS-SCNC: 12 MMOL/L (ref 9–17)
BASOPHILS # BLD: 1 %
BASOPHILS ABSOLUTE: 0 K/UL (ref 0–0.2)
BUN BLDV-MCNC: 54 MG/DL (ref 8–23)
CHLORIDE BLD-SCNC: 99 MMOL/L (ref 98–107)
CO2: 29 MMOL/L (ref 20–31)
CREAT SERPL-MCNC: 2.08 MG/DL (ref 0.5–0.9)
DIFFERENTIAL TYPE: ABNORMAL
EOSINOPHILS RELATIVE PERCENT: 3 %
GFR AFRICAN AMERICAN: 28 ML/MIN
GFR NON-AFRICAN AMERICAN: 23 ML/MIN
GFR SERPL CREATININE-BSD FRML MDRD: ABNORMAL ML/MIN/{1.73_M2}
GFR SERPL CREATININE-BSD FRML MDRD: ABNORMAL ML/MIN/{1.73_M2}
GLUCOSE BLD-MCNC: 117 MG/DL (ref 65–105)
GLUCOSE BLD-MCNC: 151 MG/DL (ref 65–105)
GLUCOSE BLD-MCNC: 165 MG/DL (ref 65–105)
GLUCOSE BLD-MCNC: 215 MG/DL (ref 65–105)
HCT VFR BLD CALC: 33.1 % (ref 36–46)
HEMOGLOBIN: 11.2 G/DL (ref 12–16)
LYMPHOCYTES # BLD: 34 %
MCH RBC QN AUTO: 29.7 PG (ref 26–34)
MCHC RBC AUTO-ENTMCNC: 33.8 G/DL (ref 31–37)
MCV RBC AUTO: 87.9 FL (ref 80–100)
MONOCYTES # BLD: 11 %
MRSA, DNA, NASAL: NORMAL
PDW BLD-RTO: 13.3 % (ref 11.5–14.5)
PLATELET # BLD: 143 K/UL (ref 130–400)
PLATELET ESTIMATE: ABNORMAL
PMV BLD AUTO: 8 FL (ref 6–12)
POTASSIUM SERPL-SCNC: 3.8 MMOL/L (ref 3.7–5.3)
RBC # BLD: 3.77 M/UL (ref 4–5.2)
RBC # BLD: ABNORMAL 10*6/UL
SEG NEUTROPHILS: 51 %
SEGMENTED NEUTROPHILS ABSOLUTE COUNT: 2.9 K/UL (ref 1.8–7.7)
SODIUM BLD-SCNC: 140 MMOL/L (ref 135–144)
SPECIMEN DESCRIPTION: NORMAL
WBC # BLD: 5.7 K/UL (ref 3.5–11)
WBC # BLD: ABNORMAL 10*3/UL

## 2017-08-21 PROCEDURE — 97535 SELF CARE MNGMENT TRAINING: CPT

## 2017-08-21 PROCEDURE — 82947 ASSAY GLUCOSE BLOOD QUANT: CPT

## 2017-08-21 PROCEDURE — 36415 COLL VENOUS BLD VENIPUNCTURE: CPT

## 2017-08-21 PROCEDURE — 94760 N-INVAS EAR/PLS OXIMETRY 1: CPT

## 2017-08-21 PROCEDURE — 82565 ASSAY OF CREATININE: CPT

## 2017-08-21 PROCEDURE — 2580000003 HC RX 258: Performed by: INTERNAL MEDICINE

## 2017-08-21 PROCEDURE — G8987 SELF CARE CURRENT STATUS: HCPCS

## 2017-08-21 PROCEDURE — G8988 SELF CARE GOAL STATUS: HCPCS

## 2017-08-21 PROCEDURE — 97165 OT EVAL LOW COMPLEX 30 MIN: CPT

## 2017-08-21 PROCEDURE — 94640 AIRWAY INHALATION TREATMENT: CPT

## 2017-08-21 PROCEDURE — 84520 ASSAY OF UREA NITROGEN: CPT

## 2017-08-21 PROCEDURE — 6370000000 HC RX 637 (ALT 250 FOR IP): Performed by: INTERNAL MEDICINE

## 2017-08-21 PROCEDURE — 6360000002 HC RX W HCPCS: Performed by: INTERNAL MEDICINE

## 2017-08-21 PROCEDURE — 87641 MR-STAPH DNA AMP PROBE: CPT

## 2017-08-21 PROCEDURE — 80051 ELECTROLYTE PANEL: CPT

## 2017-08-21 PROCEDURE — 85025 COMPLETE CBC W/AUTO DIFF WBC: CPT

## 2017-08-21 PROCEDURE — 2060000000 HC ICU INTERMEDIATE R&B

## 2017-08-21 RX ORDER — ALBUTEROL SULFATE 90 UG/1
2 AEROSOL, METERED RESPIRATORY (INHALATION) 4 TIMES DAILY
Status: DISCONTINUED | OUTPATIENT
Start: 2017-08-21 | End: 2017-08-23 | Stop reason: HOSPADM

## 2017-08-21 RX ORDER — SODIUM CHLORIDE 9 MG/ML
INJECTION, SOLUTION INTRAVENOUS CONTINUOUS
Status: DISCONTINUED | OUTPATIENT
Start: 2017-08-21 | End: 2017-08-22

## 2017-08-21 RX ADMIN — SODIUM CHLORIDE: 9 INJECTION, SOLUTION INTRAVENOUS at 09:37

## 2017-08-21 RX ADMIN — PANTOPRAZOLE SODIUM 40 MG: 40 TABLET, DELAYED RELEASE ORAL at 05:58

## 2017-08-21 RX ADMIN — IPRATROPIUM BROMIDE 2 PUFF: 17 AEROSOL, METERED RESPIRATORY (INHALATION) at 20:18

## 2017-08-21 RX ADMIN — Medication 2 UNITS: at 16:47

## 2017-08-21 RX ADMIN — CARVEDILOL 6.25 MG: 6.25 TABLET, FILM COATED ORAL at 09:33

## 2017-08-21 RX ADMIN — ISOSORBIDE MONONITRATE 30 MG: 30 TABLET ORAL at 09:33

## 2017-08-21 RX ADMIN — SPIRONOLACTONE 50 MG: 25 TABLET, FILM COATED ORAL at 09:33

## 2017-08-21 RX ADMIN — GABAPENTIN 100 MG: 100 CAPSULE ORAL at 21:38

## 2017-08-21 RX ADMIN — HYDRALAZINE HYDROCHLORIDE 50 MG: 50 TABLET ORAL at 21:38

## 2017-08-21 RX ADMIN — ALBUTEROL SULFATE 2 PUFF: 90 AEROSOL, METERED RESPIRATORY (INHALATION) at 20:18

## 2017-08-21 RX ADMIN — ATORVASTATIN CALCIUM 40 MG: 40 TABLET, FILM COATED ORAL at 21:38

## 2017-08-21 RX ADMIN — VANCOMYCIN HYDROCHLORIDE 1500 MG: 1 INJECTION, POWDER, LYOPHILIZED, FOR SOLUTION INTRAVENOUS at 00:03

## 2017-08-21 RX ADMIN — DULOXETINE HYDROCHLORIDE 30 MG: 30 CAPSULE, DELAYED RELEASE ORAL at 09:33

## 2017-08-21 RX ADMIN — CARVEDILOL 6.25 MG: 6.25 TABLET, FILM COATED ORAL at 16:48

## 2017-08-21 RX ADMIN — FERROUS SULFATE TAB EC 325 MG (65 MG FE EQUIVALENT) 325 MG: 325 (65 FE) TABLET DELAYED RESPONSE at 09:33

## 2017-08-21 RX ADMIN — Medication 2 UNITS: at 12:52

## 2017-08-21 RX ADMIN — HYDRALAZINE HYDROCHLORIDE 50 MG: 50 TABLET ORAL at 09:33

## 2017-08-21 RX ADMIN — GABAPENTIN 100 MG: 100 CAPSULE ORAL at 13:27

## 2017-08-21 RX ADMIN — ASPIRIN 81 MG 81 MG: 81 TABLET ORAL at 09:34

## 2017-08-21 RX ADMIN — GABAPENTIN 100 MG: 100 CAPSULE ORAL at 09:33

## 2017-08-21 RX ADMIN — INSULIN GLARGINE 30 UNITS: 100 INJECTION, SOLUTION SUBCUTANEOUS at 05:58

## 2017-08-21 RX ADMIN — INSULIN LISPRO 2 UNITS: 100 INJECTION, SOLUTION INTRAVENOUS; SUBCUTANEOUS at 21:43

## 2017-08-21 ASSESSMENT — PAIN SCALES - GENERAL: PAINLEVEL_OUTOF10: 4

## 2017-08-21 ASSESSMENT — PAIN DESCRIPTION - PAIN TYPE: TYPE: ACUTE PAIN;CHRONIC PAIN

## 2017-08-21 ASSESSMENT — PAIN DESCRIPTION - ORIENTATION: ORIENTATION: RIGHT

## 2017-08-21 ASSESSMENT — PAIN DESCRIPTION - LOCATION: LOCATION: ANKLE

## 2017-08-22 VITALS
TEMPERATURE: 98.4 F | BODY MASS INDEX: 40.33 KG/M2 | SYSTOLIC BLOOD PRESSURE: 165 MMHG | OXYGEN SATURATION: 96 % | HEART RATE: 67 BPM | RESPIRATION RATE: 16 BRPM | HEIGHT: 65 IN | DIASTOLIC BLOOD PRESSURE: 66 MMHG | WEIGHT: 242.1 LBS

## 2017-08-22 PROBLEM — I50.33 ACUTE ON CHRONIC DIASTOLIC HEART FAILURE (HCC): Status: ACTIVE | Noted: 2017-08-18

## 2017-08-22 LAB
ANION GAP SERPL CALCULATED.3IONS-SCNC: 11 MMOL/L (ref 9–17)
BUN BLDV-MCNC: 53 MG/DL (ref 8–23)
CHLORIDE BLD-SCNC: 100 MMOL/L (ref 98–107)
CO2: 26 MMOL/L (ref 20–31)
CREAT SERPL-MCNC: 2.01 MG/DL (ref 0.5–0.9)
GFR AFRICAN AMERICAN: 30 ML/MIN
GFR NON-AFRICAN AMERICAN: 24 ML/MIN
GFR SERPL CREATININE-BSD FRML MDRD: ABNORMAL ML/MIN/{1.73_M2}
GFR SERPL CREATININE-BSD FRML MDRD: ABNORMAL ML/MIN/{1.73_M2}
GLUCOSE BLD-MCNC: 138 MG/DL (ref 65–105)
GLUCOSE BLD-MCNC: 162 MG/DL (ref 65–105)
GLUCOSE BLD-MCNC: 180 MG/DL (ref 65–105)
POTASSIUM SERPL-SCNC: 4.1 MMOL/L (ref 3.7–5.3)
SODIUM BLD-SCNC: 137 MMOL/L (ref 135–144)

## 2017-08-22 PROCEDURE — G8980 MOBILITY D/C STATUS: HCPCS

## 2017-08-22 PROCEDURE — G8978 MOBILITY CURRENT STATUS: HCPCS

## 2017-08-22 PROCEDURE — 2580000003 HC RX 258: Performed by: INTERNAL MEDICINE

## 2017-08-22 PROCEDURE — 82947 ASSAY GLUCOSE BLOOD QUANT: CPT

## 2017-08-22 PROCEDURE — 36415 COLL VENOUS BLD VENIPUNCTURE: CPT

## 2017-08-22 PROCEDURE — 87641 MR-STAPH DNA AMP PROBE: CPT

## 2017-08-22 PROCEDURE — 82565 ASSAY OF CREATININE: CPT

## 2017-08-22 PROCEDURE — 6360000002 HC RX W HCPCS: Performed by: INTERNAL MEDICINE

## 2017-08-22 PROCEDURE — 97530 THERAPEUTIC ACTIVITIES: CPT | Performed by: NURSE PRACTITIONER

## 2017-08-22 PROCEDURE — 84520 ASSAY OF UREA NITROGEN: CPT

## 2017-08-22 PROCEDURE — 6370000000 HC RX 637 (ALT 250 FOR IP): Performed by: INTERNAL MEDICINE

## 2017-08-22 PROCEDURE — G8979 MOBILITY GOAL STATUS: HCPCS

## 2017-08-22 PROCEDURE — 97110 THERAPEUTIC EXERCISES: CPT

## 2017-08-22 PROCEDURE — 97161 PT EVAL LOW COMPLEX 20 MIN: CPT

## 2017-08-22 PROCEDURE — 80051 ELECTROLYTE PANEL: CPT

## 2017-08-22 PROCEDURE — 94640 AIRWAY INHALATION TREATMENT: CPT

## 2017-08-22 RX ORDER — SPIRONOLACTONE 50 MG/1
50 TABLET, FILM COATED ORAL DAILY
Qty: 60 TABLET | Refills: 3 | Status: SHIPPED | OUTPATIENT
Start: 2017-08-22 | End: 2017-08-22 | Stop reason: HOSPADM

## 2017-08-22 RX ORDER — BUMETANIDE 2 MG/1
1 TABLET ORAL DAILY
Qty: 30 TABLET | Refills: 3 | Status: ON HOLD | OUTPATIENT
Start: 2017-08-22 | End: 2018-02-12

## 2017-08-22 RX ORDER — GABAPENTIN 100 MG/1
100 CAPSULE ORAL 3 TIMES DAILY
Qty: 90 CAPSULE | Refills: 2 | Status: SHIPPED | OUTPATIENT
Start: 2017-08-22 | End: 2019-10-25

## 2017-08-22 RX ADMIN — ISOSORBIDE MONONITRATE 30 MG: 30 TABLET ORAL at 08:14

## 2017-08-22 RX ADMIN — ALBUTEROL SULFATE 2 PUFF: 90 AEROSOL, METERED RESPIRATORY (INHALATION) at 12:03

## 2017-08-22 RX ADMIN — GABAPENTIN 100 MG: 100 CAPSULE ORAL at 21:28

## 2017-08-22 RX ADMIN — ASPIRIN 81 MG 81 MG: 81 TABLET ORAL at 08:15

## 2017-08-22 RX ADMIN — GABAPENTIN 100 MG: 100 CAPSULE ORAL at 15:05

## 2017-08-22 RX ADMIN — HYDROCODONE BITARTRATE AND ACETAMINOPHEN 1 TABLET: 5; 325 TABLET ORAL at 01:58

## 2017-08-22 RX ADMIN — ALBUTEROL SULFATE 2 PUFF: 90 AEROSOL, METERED RESPIRATORY (INHALATION) at 16:34

## 2017-08-22 RX ADMIN — IPRATROPIUM BROMIDE 2 PUFF: 17 AEROSOL, METERED RESPIRATORY (INHALATION) at 16:34

## 2017-08-22 RX ADMIN — HYDRALAZINE HYDROCHLORIDE 50 MG: 50 TABLET ORAL at 21:28

## 2017-08-22 RX ADMIN — ALBUTEROL SULFATE 2 PUFF: 90 AEROSOL, METERED RESPIRATORY (INHALATION) at 08:18

## 2017-08-22 RX ADMIN — CARVEDILOL 6.25 MG: 6.25 TABLET, FILM COATED ORAL at 08:14

## 2017-08-22 RX ADMIN — IPRATROPIUM BROMIDE 2 PUFF: 17 AEROSOL, METERED RESPIRATORY (INHALATION) at 08:18

## 2017-08-22 RX ADMIN — DULOXETINE HYDROCHLORIDE 30 MG: 30 CAPSULE, DELAYED RELEASE ORAL at 08:14

## 2017-08-22 RX ADMIN — CARVEDILOL 6.25 MG: 6.25 TABLET, FILM COATED ORAL at 16:53

## 2017-08-22 RX ADMIN — FERROUS SULFATE TAB EC 325 MG (65 MG FE EQUIVALENT) 325 MG: 325 (65 FE) TABLET DELAYED RESPONSE at 08:15

## 2017-08-22 RX ADMIN — Medication 2 UNITS: at 12:50

## 2017-08-22 RX ADMIN — SODIUM CHLORIDE: 9 INJECTION, SOLUTION INTRAVENOUS at 05:36

## 2017-08-22 RX ADMIN — HYDRALAZINE HYDROCHLORIDE 50 MG: 50 TABLET ORAL at 08:14

## 2017-08-22 RX ADMIN — ATORVASTATIN CALCIUM 40 MG: 40 TABLET, FILM COATED ORAL at 21:28

## 2017-08-22 RX ADMIN — GABAPENTIN 100 MG: 100 CAPSULE ORAL at 08:14

## 2017-08-22 RX ADMIN — PANTOPRAZOLE SODIUM 40 MG: 40 TABLET, DELAYED RELEASE ORAL at 08:14

## 2017-08-22 RX ADMIN — VANCOMYCIN HYDROCHLORIDE 1500 MG: 1 INJECTION, POWDER, LYOPHILIZED, FOR SOLUTION INTRAVENOUS at 00:33

## 2017-08-22 RX ADMIN — SPIRONOLACTONE 50 MG: 25 TABLET, FILM COATED ORAL at 08:14

## 2017-08-22 RX ADMIN — INSULIN GLARGINE 30 UNITS: 100 INJECTION, SOLUTION SUBCUTANEOUS at 08:52

## 2017-08-22 RX ADMIN — Medication 2 UNITS: at 17:42

## 2017-08-22 RX ADMIN — IPRATROPIUM BROMIDE 2 PUFF: 17 AEROSOL, METERED RESPIRATORY (INHALATION) at 12:03

## 2017-08-22 ASSESSMENT — PAIN SCALES - GENERAL
PAINLEVEL_OUTOF10: 0
PAINLEVEL_OUTOF10: 7
PAINLEVEL_OUTOF10: 0

## 2017-08-23 LAB
MRSA, DNA, NASAL: NORMAL
SPECIMEN DESCRIPTION: NORMAL

## 2017-08-24 LAB
CULTURE: NORMAL
Lab: NORMAL
SPECIMEN DESCRIPTION: NORMAL
STATUS: NORMAL
STATUS: NORMAL

## 2017-10-30 ENCOUNTER — HOSPITAL ENCOUNTER (OUTPATIENT)
Age: 72
Setting detail: SPECIMEN
Discharge: HOME | End: 2017-10-30
Payer: MEDICARE

## 2017-10-30 LAB — ALBUMIN SERPL-MCNC: 4.4 G/DL (ref 3.5–5.2)

## 2017-11-01 ENCOUNTER — HOSPITAL ENCOUNTER (OUTPATIENT)
Age: 72
Setting detail: SPECIMEN
Discharge: HOME | End: 2017-11-01
Payer: MEDICARE

## 2017-11-01 LAB
ALBUMIN SERPL-MCNC: 4.5 G/DL (ref 3.5–5.2)
HOURS COLLECTED: 24 H
PROTEIN 24 HOUR URINE: 123 MG/24 H
PROTEIN,TOT TIMED UR: NORMAL MG/X H
URINE TOTAL PROTEIN: 6 MG/DL
VOLUME: 2055 ML

## 2018-02-08 ENCOUNTER — HOSPITAL ENCOUNTER (INPATIENT)
Age: 73
LOS: 5 days | Discharge: HOME | DRG: 291 | End: 2018-02-13
Attending: INTERNAL MEDICINE | Admitting: INTERNAL MEDICINE
Payer: MEDICARE

## 2018-02-08 ENCOUNTER — APPOINTMENT (OUTPATIENT)
Dept: GENERAL RADIOLOGY | Age: 73
DRG: 291 | End: 2018-02-08
Attending: INTERNAL MEDICINE
Payer: MEDICARE

## 2018-02-08 PROBLEM — I50.43 CHF (CONGESTIVE HEART FAILURE), NYHA CLASS I, ACUTE ON CHRONIC, COMBINED (HCC): Status: ACTIVE | Noted: 2018-02-08

## 2018-02-08 PROBLEM — N18.4 STAGE 4 CHRONIC KIDNEY DISEASE (HCC): Status: ACTIVE | Noted: 2018-02-08

## 2018-02-08 PROBLEM — L03.115 CELLULITIS OF BOTH LOWER EXTREMITIES: Status: ACTIVE | Noted: 2018-02-08

## 2018-02-08 PROBLEM — E66.01 MORBID OBESITY WITH BMI OF 40.0-44.9, ADULT (HCC): Status: ACTIVE | Noted: 2018-02-08

## 2018-02-08 PROBLEM — L03.116 CELLULITIS OF BOTH LOWER EXTREMITIES: Status: ACTIVE | Noted: 2018-02-08

## 2018-02-08 PROBLEM — I50.33 ACUTE ON CHRONIC DIASTOLIC CONGESTIVE HEART FAILURE (HCC): Status: ACTIVE | Noted: 2018-02-08

## 2018-02-08 LAB
ABSOLUTE EOS #: 0.2 K/UL (ref 0–0.4)
ABSOLUTE IMMATURE GRANULOCYTE: ABNORMAL K/UL (ref 0–0.3)
ABSOLUTE LYMPH #: 1.7 K/UL (ref 1–4.8)
ABSOLUTE MONO #: 0.4 K/UL (ref 0.2–0.8)
ALBUMIN SERPL-MCNC: 4.1 G/DL (ref 3.5–5.2)
ANION GAP SERPL CALCULATED.3IONS-SCNC: 15 MMOL/L (ref 9–17)
BASOPHILS # BLD: 1 % (ref 0–2)
BASOPHILS ABSOLUTE: 0 K/UL (ref 0–0.2)
BNP INTERPRETATION: ABNORMAL
BUN BLDV-MCNC: 49 MG/DL (ref 8–23)
BUN/CREAT BLD: 25 (ref 9–20)
CALCIUM IONIZED: 1.19 MMOL/L (ref 1.13–1.33)
CALCIUM SERPL-MCNC: 9.6 MG/DL (ref 8.6–10.4)
CHLORIDE BLD-SCNC: 102 MMOL/L (ref 98–107)
CO2: 29 MMOL/L (ref 20–31)
CREAT SERPL-MCNC: 1.96 MG/DL (ref 0.5–0.9)
CREATININE URINE: 29.3 MG/DL (ref 28–217)
DIFFERENTIAL TYPE: ABNORMAL
EOSINOPHILS RELATIVE PERCENT: 3 % (ref 1–4)
ESTIMATED AVERAGE GLUCOSE: 183 MG/DL
GFR AFRICAN AMERICAN: 30 ML/MIN
GFR NON-AFRICAN AMERICAN: 25 ML/MIN
GFR SERPL CREATININE-BSD FRML MDRD: ABNORMAL ML/MIN/{1.73_M2}
GFR SERPL CREATININE-BSD FRML MDRD: ABNORMAL ML/MIN/{1.73_M2}
GLUCOSE BLD-MCNC: 142 MG/DL (ref 65–105)
GLUCOSE BLD-MCNC: 168 MG/DL (ref 70–99)
GLUCOSE BLD-MCNC: 219 MG/DL (ref 65–105)
HBA1C MFR BLD: 8 % (ref 4–6)
HCT VFR BLD CALC: 31.4 % (ref 36–46)
HEMOGLOBIN: 10.3 G/DL (ref 12–16)
IMMATURE GRANULOCYTES: ABNORMAL %
LYMPHOCYTES # BLD: 27 % (ref 24–44)
MAGNESIUM: 1.9 MG/DL (ref 1.6–2.6)
MCH RBC QN AUTO: 28.6 PG (ref 26–34)
MCHC RBC AUTO-ENTMCNC: 32.8 G/DL (ref 31–37)
MCV RBC AUTO: 87 FL (ref 80–100)
MICROALBUMIN/CREAT 24H UR: <12 MG/L
MICROALBUMIN/CREAT UR-RTO: NORMAL MCG/MG CREAT
MONOCYTES # BLD: 7 % (ref 1–7)
NRBC AUTOMATED: ABNORMAL PER 100 WBC
PDW BLD-RTO: 14.3 % (ref 11.5–14.5)
PLATELET # BLD: 186 K/UL (ref 130–400)
PLATELET ESTIMATE: ABNORMAL
PMV BLD AUTO: 7.9 FL (ref 6–12)
POTASSIUM SERPL-SCNC: 3.8 MMOL/L (ref 3.7–5.3)
PRO-BNP: 860 PG/ML
RBC # BLD: 3.61 M/UL (ref 4–5.2)
RBC # BLD: ABNORMAL 10*6/UL
SEG NEUTROPHILS: 62 % (ref 36–66)
SEGMENTED NEUTROPHILS ABSOLUTE COUNT: 4.2 K/UL (ref 1.8–7.7)
SODIUM BLD-SCNC: 146 MMOL/L (ref 135–144)
THYROXINE, FREE: 1.1 NG/DL (ref 0.93–1.7)
TSH SERPL DL<=0.05 MIU/L-ACNC: 2.04 MIU/L (ref 0.3–5)
WBC # BLD: 6.6 K/UL (ref 3.5–11)
WBC # BLD: ABNORMAL 10*3/UL

## 2018-02-08 PROCEDURE — 6360000002 HC RX W HCPCS: Performed by: INTERNAL MEDICINE

## 2018-02-08 PROCEDURE — 87070 CULTURE OTHR SPECIMN AEROBIC: CPT

## 2018-02-08 PROCEDURE — 94664 DEMO&/EVAL PT USE INHALER: CPT

## 2018-02-08 PROCEDURE — 87205 SMEAR GRAM STAIN: CPT

## 2018-02-08 PROCEDURE — 83735 ASSAY OF MAGNESIUM: CPT

## 2018-02-08 PROCEDURE — 84439 ASSAY OF FREE THYROXINE: CPT

## 2018-02-08 PROCEDURE — 2500000003 HC RX 250 WO HCPCS: Performed by: INTERNAL MEDICINE

## 2018-02-08 PROCEDURE — 2060000000 HC ICU INTERMEDIATE R&B

## 2018-02-08 PROCEDURE — 82330 ASSAY OF CALCIUM: CPT

## 2018-02-08 PROCEDURE — 87186 SC STD MICRODIL/AGAR DIL: CPT

## 2018-02-08 PROCEDURE — 82570 ASSAY OF URINE CREATININE: CPT

## 2018-02-08 PROCEDURE — 87040 BLOOD CULTURE FOR BACTERIA: CPT

## 2018-02-08 PROCEDURE — 94761 N-INVAS EAR/PLS OXIMETRY MLT: CPT

## 2018-02-08 PROCEDURE — 82043 UR ALBUMIN QUANTITATIVE: CPT

## 2018-02-08 PROCEDURE — 87086 URINE CULTURE/COLONY COUNT: CPT

## 2018-02-08 PROCEDURE — 82947 ASSAY GLUCOSE BLOOD QUANT: CPT

## 2018-02-08 PROCEDURE — 71046 X-RAY EXAM CHEST 2 VIEWS: CPT

## 2018-02-08 PROCEDURE — 36415 COLL VENOUS BLD VENIPUNCTURE: CPT

## 2018-02-08 PROCEDURE — 84443 ASSAY THYROID STIM HORMONE: CPT

## 2018-02-08 PROCEDURE — 6370000000 HC RX 637 (ALT 250 FOR IP): Performed by: INTERNAL MEDICINE

## 2018-02-08 PROCEDURE — 94640 AIRWAY INHALATION TREATMENT: CPT

## 2018-02-08 PROCEDURE — 2580000003 HC RX 258: Performed by: INTERNAL MEDICINE

## 2018-02-08 PROCEDURE — 83036 HEMOGLOBIN GLYCOSYLATED A1C: CPT

## 2018-02-08 PROCEDURE — 82040 ASSAY OF SERUM ALBUMIN: CPT

## 2018-02-08 PROCEDURE — 83880 ASSAY OF NATRIURETIC PEPTIDE: CPT

## 2018-02-08 PROCEDURE — 87077 CULTURE AEROBIC IDENTIFY: CPT

## 2018-02-08 PROCEDURE — 80048 BASIC METABOLIC PNL TOTAL CA: CPT

## 2018-02-08 PROCEDURE — 85025 COMPLETE CBC W/AUTO DIFF WBC: CPT

## 2018-02-08 RX ORDER — ISOSORBIDE MONONITRATE 30 MG/1
30 TABLET, EXTENDED RELEASE ORAL DAILY
Status: DISCONTINUED | OUTPATIENT
Start: 2018-02-08 | End: 2018-02-13 | Stop reason: HOSPADM

## 2018-02-08 RX ORDER — ASPIRIN 81 MG/1
81 TABLET ORAL DAILY
Status: DISCONTINUED | OUTPATIENT
Start: 2018-02-08 | End: 2018-02-13 | Stop reason: HOSPADM

## 2018-02-08 RX ORDER — HYDROCODONE BITARTRATE AND ACETAMINOPHEN 5; 325 MG/1; MG/1
1 TABLET ORAL EVERY 6 HOURS PRN
Status: DISCONTINUED | OUTPATIENT
Start: 2018-02-08 | End: 2018-02-13 | Stop reason: HOSPADM

## 2018-02-08 RX ORDER — INSULIN GLARGINE 100 [IU]/ML
30 INJECTION, SOLUTION SUBCUTANEOUS NIGHTLY
Status: DISCONTINUED | OUTPATIENT
Start: 2018-02-08 | End: 2018-02-13 | Stop reason: HOSPADM

## 2018-02-08 RX ORDER — HYDRALAZINE HYDROCHLORIDE 50 MG/1
50 TABLET, FILM COATED ORAL EVERY 8 HOURS SCHEDULED
Status: DISCONTINUED | OUTPATIENT
Start: 2018-02-08 | End: 2018-02-13 | Stop reason: HOSPADM

## 2018-02-08 RX ORDER — IPRATROPIUM BROMIDE AND ALBUTEROL SULFATE 2.5; .5 MG/3ML; MG/3ML
1 SOLUTION RESPIRATORY (INHALATION) EVERY 6 HOURS
Status: DISCONTINUED | OUTPATIENT
Start: 2018-02-08 | End: 2018-02-13 | Stop reason: HOSPADM

## 2018-02-08 RX ORDER — ATORVASTATIN CALCIUM 40 MG/1
40 TABLET, FILM COATED ORAL DAILY
Status: DISCONTINUED | OUTPATIENT
Start: 2018-02-08 | End: 2018-02-13 | Stop reason: HOSPADM

## 2018-02-08 RX ORDER — DEXTROSE MONOHYDRATE 25 G/50ML
12.5 INJECTION, SOLUTION INTRAVENOUS PRN
Status: DISCONTINUED | OUTPATIENT
Start: 2018-02-08 | End: 2018-02-13 | Stop reason: HOSPADM

## 2018-02-08 RX ORDER — GABAPENTIN 100 MG/1
100 CAPSULE ORAL 3 TIMES DAILY
Status: DISCONTINUED | OUTPATIENT
Start: 2018-02-08 | End: 2018-02-13 | Stop reason: HOSPADM

## 2018-02-08 RX ORDER — IPRATROPIUM BROMIDE AND ALBUTEROL SULFATE 2.5; .5 MG/3ML; MG/3ML
1 SOLUTION RESPIRATORY (INHALATION) EVERY 6 HOURS
Status: DISCONTINUED | OUTPATIENT
Start: 2018-02-08 | End: 2018-02-08

## 2018-02-08 RX ORDER — NICOTINE POLACRILEX 4 MG
15 LOZENGE BUCCAL PRN
Status: DISCONTINUED | OUTPATIENT
Start: 2018-02-08 | End: 2018-02-13 | Stop reason: HOSPADM

## 2018-02-08 RX ORDER — BUMETANIDE 0.25 MG/ML
1 INJECTION, SOLUTION INTRAMUSCULAR; INTRAVENOUS 2 TIMES DAILY
Status: DISCONTINUED | OUTPATIENT
Start: 2018-02-08 | End: 2018-02-09

## 2018-02-08 RX ORDER — LANOLIN ALCOHOL/MO/W.PET/CERES
325 CREAM (GRAM) TOPICAL
Status: DISCONTINUED | OUTPATIENT
Start: 2018-02-09 | End: 2018-02-13 | Stop reason: HOSPADM

## 2018-02-08 RX ORDER — POTASSIUM CHLORIDE 750 MG/1
10 CAPSULE, EXTENDED RELEASE ORAL 2 TIMES DAILY
Status: DISCONTINUED | OUTPATIENT
Start: 2018-02-08 | End: 2018-02-13 | Stop reason: HOSPADM

## 2018-02-08 RX ORDER — DULOXETIN HYDROCHLORIDE 30 MG/1
30 CAPSULE, DELAYED RELEASE ORAL DAILY
Status: DISCONTINUED | OUTPATIENT
Start: 2018-02-08 | End: 2018-02-13 | Stop reason: HOSPADM

## 2018-02-08 RX ORDER — CARVEDILOL 12.5 MG/1
12.5 TABLET ORAL 2 TIMES DAILY
Status: DISCONTINUED | OUTPATIENT
Start: 2018-02-08 | End: 2018-02-13 | Stop reason: HOSPADM

## 2018-02-08 RX ORDER — DEXTROSE MONOHYDRATE 50 MG/ML
100 INJECTION, SOLUTION INTRAVENOUS PRN
Status: DISCONTINUED | OUTPATIENT
Start: 2018-02-08 | End: 2018-02-13 | Stop reason: HOSPADM

## 2018-02-08 RX ADMIN — CEFTRIAXONE SODIUM 1 G: 10 INJECTION, POWDER, FOR SOLUTION INTRAVENOUS at 23:52

## 2018-02-08 RX ADMIN — POTASSIUM CHLORIDE 10 MEQ: 750 CAPSULE, EXTENDED RELEASE ORAL at 21:09

## 2018-02-08 RX ADMIN — VANCOMYCIN HYDROCHLORIDE 1500 MG: 500 INJECTION, POWDER, LYOPHILIZED, FOR SOLUTION INTRAVENOUS at 17:59

## 2018-02-08 RX ADMIN — ATORVASTATIN CALCIUM 40 MG: 40 TABLET, FILM COATED ORAL at 21:02

## 2018-02-08 RX ADMIN — HYDRALAZINE HYDROCHLORIDE 50 MG: 50 TABLET, FILM COATED ORAL at 21:54

## 2018-02-08 RX ADMIN — IPRATROPIUM BROMIDE AND ALBUTEROL SULFATE 1 AMPULE: .5; 3 SOLUTION RESPIRATORY (INHALATION) at 20:48

## 2018-02-08 RX ADMIN — HYDROCODONE BITARTRATE AND ACETAMINOPHEN 1 TABLET: 5; 325 TABLET ORAL at 17:59

## 2018-02-08 RX ADMIN — GABAPENTIN 100 MG: 100 CAPSULE ORAL at 21:02

## 2018-02-08 RX ADMIN — INSULIN LISPRO 2 UNITS: 100 INJECTION, SOLUTION INTRAVENOUS; SUBCUTANEOUS at 21:04

## 2018-02-08 RX ADMIN — HYDRALAZINE HYDROCHLORIDE 50 MG: 50 TABLET, FILM COATED ORAL at 17:51

## 2018-02-08 RX ADMIN — GABAPENTIN 100 MG: 100 CAPSULE ORAL at 17:51

## 2018-02-08 RX ADMIN — CARVEDILOL 12.5 MG: 12.5 TABLET, FILM COATED ORAL at 21:02

## 2018-02-08 RX ADMIN — BUMETANIDE 1 MG: 0.25 INJECTION INTRAMUSCULAR; INTRAVENOUS at 21:01

## 2018-02-08 ASSESSMENT — PAIN SCALES - GENERAL
PAINLEVEL_OUTOF10: 2
PAINLEVEL_OUTOF10: 10

## 2018-02-08 NOTE — PROGRESS NOTES
Pharmacy Note  Vancomycin Consult    Oscar Thomas is a 67 y.o. female started on Vancomycin for leg wound; consult received from Dr. Lawanda Davenport to manage therapy.      Patient Active Problem List   Diagnosis    Diastolic heart failure (HCC)    Type 2 diabetes mellitus with proteinuria or albuminuria    Hypertension    Aortic regurgitation    Poorly controlled diabetes mellitus ([address]    Diabetic neuropathy ([address]    Dyslipidemia associated with type 2 diabetes mellitus ([address]    LAD stenosis    Acute on chronic diastolic heart failure ([address]    Diabetic nephropathy associated with type 2 diabetes mellitus (HCC)    Pedal edema    Obstructive sleep apnea    Diabetes type 2, uncontrolled ([address]    Diabetic polyneuropathy associated with type 2 diabetes mellitus ([address]    Dyslipidemia    Uncontrolled diabetes mellitus with polyneuropathy ([address]    Diabetic nephropathy associated with type 2 diabetes mellitus (HCC)    Pedal edema    Depression (emotion)    Encephalopathy acute    Sepsis due to methicillin resistant Staphylococcus aureus (MRSA) (HCC)    Weakness of left arm    Endocarditis determined by echocardiography    Cerebral septic emboli (HCC)    Lumbar disc herniation    Meningitis, staphylococcal    Cellulitis of right lower leg    Ulcer of right lower leg (HCC)    Bilateral edema of lower extremity    Diabetes mellitus type 2, uncontrolled ([address]    Diabetic polyneuropathy associated with type 2 diabetes mellitus (HCC)    Chronic renal failure, stage 3 (moderate)    Acute on chronic diastolic heart failure ([address]    DM type 2 (diabetes mellitus, type 2) (HCC)    CKD (chronic kidney disease) stage 3, GFR 30-59 ml/min    Edema    CHF (congestive heart failure), NYHA class I, acute on chronic, combined (HCC)    Acute on chronic diastolic congestive heart failure (HCC)    Stage 4 chronic kidney disease (HCC)    Cellulitis of both lower extremities    Uncontrolled type 2 diabetes mellitus (HCC)       Allergies:  Ambien [zolpidem tartrate]     Temp max: 98.2 F    Recent Labs      02/07/18   1224   BUN  50*       Recent Labs      02/07/18   1224   CREATININE  2.12*       Recent Labs      02/07/18   1224   WBC  6.9       No intake or output data in the 24 hours ending 02/08/18 1703    Culture Date      Source                       Results  Leg wound collected    Ht Readings from Last 1 Encounters:   02/08/18 5' 5\" (1.651 m)        Wt Readings from Last 1 Encounters:   02/08/18 240 lb (108.9 kg)         Body mass index is 39.94 kg/m². Estimated Creatinine Clearance: 29 mL/min (based on SCr of 2.12 mg/dL). Assessment/Plan:  Will initiate vancomycin 1500 mg IV every 36 hours. Timing of trough level will be determined based on culture results, renal function, and clinical response. Will check trough level on 2/11/18 @1700 prior to 3rd dose. Thank you for the consult. Will continue to follow.

## 2018-02-08 NOTE — H&P
Department of Internal Medicine  Attending History and Physical        CHIEF COMPLAINT:  Worsening leg swelling/shortness of breath    History Obtained From: Patient    HISTORY OF PRESENT ILLNESS:   67year old, morbidly obese, diabetic female, seen in the office today and was subsequently admitted for worsening bilateral pedal edema (approx two months, worse in the last 2 weeks, worsening exertional dyspnea and non-healing wound to her lower legs which had start to \"seep\" within the last 7-10 days. She denied any chills/fever. She has a history of aortic valve replacement, chronic congestive heart failure, obstructive sleep apnea (uses a CPAP at home). She is non-compliant with her diet but takes her meds regularly. She recently have been to the wound care clinic [facility]), wound cultures were apparently done (report not available) and was prescribed gentamycin cream with no appreciable response. Co-morbidities are outlined under past history.      Past Medical/Surgical History:  Past Medical History:   Diagnosis Date    Arthritis     right knee    Cerebral artery occlusion with cerebral infarction ([facility] 02/2017    weakness in left hand    CHF [facility]congestive heart failure) ([facility]     CKD (chronic kidney disease) stage 3, GFR 30-59 ml/min     COPD (chronic obstructive pulmonary disease) ([facility])     Depression (emotion) 2/13/2016    Diabetes mellitus ([facility]     Diabetic neuropathy ([facility] 10/23/2014    DM type 2 (diabetes mellitus, type 2) ([facility]     Edema     Hx MRSA infection resolved 8/2017    2 negative nasal screens - 8/2017 (hx in blood & CSF - 2016)    Hyperlipidemia     Hypertension     LAD stenosis 10/28/2014    Pneumonia many years ago    Sleep apnea     no machine    SOB (shortness of breath)     Type 2 diabetes mellitus with proteinuria or albuminuria 10/23/2014         Past Surgical History:   Procedure Laterality Date    AORTIC VALVE REPLACEMENT  july 2015    CARDIAC CATHETERIZATION 2014    TONSILLECTOMY         Current Facility-Administered Medications on File Prior to Encounter   Medication Dose Route Frequency Provider Last Rate Last Dose    sodium chloride flush 0.9 % injection 10 mL  10 mL Intravenous 2 times per day Marques Uriostegui PA-C        sodium chloride flush 0.9 % injection 10 mL  10 mL Intravenous PRN Marques Uriostegui PA-C         Current Outpatient Prescriptions on File Prior to Encounter   Medication Sig Dispense Refill    insulin aspart (NOVOLOG FLEXPEN) 100 UNIT/ML injection pen Inject into the skin 3 times daily (before meals)      gabapentin (NEURONTIN) 100 MG capsule Take 1 capsule by mouth 3 times daily 90 capsule 2    bumetanide (BUMEX) 2 MG tablet Take 0.5 tablets by mouth daily 30 tablet 3    albuterol-ipratropium (COMBIVENT RESPIMAT)  MCG/ACT AERS inhaler Inhale 1 puff into the lungs every 6 hours 1 Inhaler 1    carvedilol (COREG) 12.5 MG tablet Take 12.5 mg by mouth 2 times daily      glipiZIDE (GLUCOTROL) 5 MG tablet Take 2.5 mg by mouth daily      hydrALAZINE (APRESOLINE) 100 MG tablet Take 100 mg by mouth 2 times daily      insulin glargine (LANTUS) 100 UNIT/ML injection vial Inject 30 Units into the skin nightly       isosorbide mononitrate (IMDUR) 30 MG extended release tablet Take 30 mg by mouth daily      DULoxetine (CYMBALTA) 30 MG extended release capsule Take 30 mg by mouth daily      atorvastatin (LIPITOR) 40 MG tablet Take 40 mg by mouth daily      ferrous sulfate (FE TABS) 325 (65 FE) MG EC tablet Take 1 tablet by mouth daily (with breakfast) 90 tablet 3    HYDROcodone-acetaminophen (NORCO) 5-325 MG per tablet Take 1 tablet by mouth every 8 hours as needed for Pain      aspirin 81 MG tablet Take 81 mg by mouth daily. Allergies:  Ambien [zolpidem tartrate]    Social History:   Social History     Social History    Marital status:       Spouse name: N/A    Number of children: 0    Years of education: 12     Occupational

## 2018-02-09 LAB
ANION GAP SERPL CALCULATED.3IONS-SCNC: 13 MMOL/L (ref 9–17)
BUN BLDV-MCNC: 49 MG/DL (ref 8–23)
BUN/CREAT BLD: 23 (ref 9–20)
CALCIUM SERPL-MCNC: 9.2 MG/DL (ref 8.6–10.4)
CHLORIDE BLD-SCNC: 100 MMOL/L (ref 98–107)
CO2: 29 MMOL/L (ref 20–31)
CREAT SERPL-MCNC: 2.13 MG/DL (ref 0.5–0.9)
CULTURE: NORMAL
CULTURE: NORMAL
GFR AFRICAN AMERICAN: 28 ML/MIN
GFR NON-AFRICAN AMERICAN: 23 ML/MIN
GFR SERPL CREATININE-BSD FRML MDRD: ABNORMAL ML/MIN/{1.73_M2}
GFR SERPL CREATININE-BSD FRML MDRD: ABNORMAL ML/MIN/{1.73_M2}
GLUCOSE BLD-MCNC: 121 MG/DL (ref 65–105)
GLUCOSE BLD-MCNC: 123 MG/DL (ref 65–105)
GLUCOSE BLD-MCNC: 142 MG/DL (ref 70–99)
GLUCOSE BLD-MCNC: 194 MG/DL (ref 65–105)
GLUCOSE BLD-MCNC: 206 MG/DL (ref 65–105)
LV EF: 65 %
LVEF MODALITY: NORMAL
Lab: NORMAL
POTASSIUM SERPL-SCNC: 4 MMOL/L (ref 3.7–5.3)
SODIUM BLD-SCNC: 142 MMOL/L (ref 135–144)
SPECIMEN DESCRIPTION: NORMAL
SPECIMEN DESCRIPTION: NORMAL
STATUS: NORMAL

## 2018-02-09 PROCEDURE — 93306 TTE W/DOPPLER COMPLETE: CPT

## 2018-02-09 PROCEDURE — 6370000000 HC RX 637 (ALT 250 FOR IP): Performed by: INTERNAL MEDICINE

## 2018-02-09 PROCEDURE — 51798 US URINE CAPACITY MEASURE: CPT

## 2018-02-09 PROCEDURE — 2060000000 HC ICU INTERMEDIATE R&B

## 2018-02-09 PROCEDURE — 99211 OFF/OP EST MAY X REQ PHY/QHP: CPT

## 2018-02-09 PROCEDURE — 2500000003 HC RX 250 WO HCPCS: Performed by: INTERNAL MEDICINE

## 2018-02-09 PROCEDURE — 36415 COLL VENOUS BLD VENIPUNCTURE: CPT

## 2018-02-09 PROCEDURE — 82947 ASSAY GLUCOSE BLOOD QUANT: CPT

## 2018-02-09 PROCEDURE — 6360000002 HC RX W HCPCS: Performed by: INTERNAL MEDICINE

## 2018-02-09 PROCEDURE — 94760 N-INVAS EAR/PLS OXIMETRY 1: CPT

## 2018-02-09 PROCEDURE — 80048 BASIC METABOLIC PNL TOTAL CA: CPT

## 2018-02-09 PROCEDURE — 94640 AIRWAY INHALATION TREATMENT: CPT

## 2018-02-09 RX ORDER — BUMETANIDE 0.25 MG/ML
2 INJECTION, SOLUTION INTRAMUSCULAR; INTRAVENOUS 2 TIMES DAILY
Status: DISCONTINUED | OUTPATIENT
Start: 2018-02-09 | End: 2018-02-10

## 2018-02-09 RX ORDER — METOLAZONE 5 MG/1
5 TABLET ORAL DAILY
Status: ON HOLD | COMMUNITY
End: 2018-02-12 | Stop reason: HOSPADM

## 2018-02-09 RX ADMIN — CARVEDILOL 12.5 MG: 12.5 TABLET, FILM COATED ORAL at 08:05

## 2018-02-09 RX ADMIN — IPRATROPIUM BROMIDE AND ALBUTEROL SULFATE 1 AMPULE: .5; 3 SOLUTION RESPIRATORY (INHALATION) at 15:05

## 2018-02-09 RX ADMIN — GABAPENTIN 100 MG: 100 CAPSULE ORAL at 08:05

## 2018-02-09 RX ADMIN — ATORVASTATIN CALCIUM 40 MG: 40 TABLET, FILM COATED ORAL at 21:05

## 2018-02-09 RX ADMIN — HYDRALAZINE HYDROCHLORIDE 50 MG: 50 TABLET, FILM COATED ORAL at 13:16

## 2018-02-09 RX ADMIN — ISOSORBIDE MONONITRATE 30 MG: 30 TABLET ORAL at 08:05

## 2018-02-09 RX ADMIN — POTASSIUM CHLORIDE 10 MEQ: 750 CAPSULE, EXTENDED RELEASE ORAL at 21:05

## 2018-02-09 RX ADMIN — Medication 30 UNITS: at 21:21

## 2018-02-09 RX ADMIN — BUMETANIDE 2 MG: 0.25 INJECTION INTRAMUSCULAR; INTRAVENOUS at 21:07

## 2018-02-09 RX ADMIN — CARVEDILOL 12.5 MG: 12.5 TABLET, FILM COATED ORAL at 21:05

## 2018-02-09 RX ADMIN — IPRATROPIUM BROMIDE AND ALBUTEROL SULFATE 1 AMPULE: .5; 3 SOLUTION RESPIRATORY (INHALATION) at 20:25

## 2018-02-09 RX ADMIN — GABAPENTIN 100 MG: 100 CAPSULE ORAL at 21:05

## 2018-02-09 RX ADMIN — FERROUS SULFATE TAB EC 325 MG (65 MG FE EQUIVALENT) 325 MG: 325 (65 FE) TABLET DELAYED RESPONSE at 08:05

## 2018-02-09 RX ADMIN — GABAPENTIN 100 MG: 100 CAPSULE ORAL at 13:16

## 2018-02-09 RX ADMIN — CEFTRIAXONE SODIUM 1 G: 10 INJECTION, POWDER, FOR SOLUTION INTRAVENOUS at 23:48

## 2018-02-09 RX ADMIN — HYDRALAZINE HYDROCHLORIDE 50 MG: 50 TABLET, FILM COATED ORAL at 21:23

## 2018-02-09 RX ADMIN — ASPIRIN 81 MG: 81 TABLET, COATED ORAL at 08:05

## 2018-02-09 RX ADMIN — IPRATROPIUM BROMIDE AND ALBUTEROL SULFATE 1 AMPULE: .5; 3 SOLUTION RESPIRATORY (INHALATION) at 09:26

## 2018-02-09 RX ADMIN — BUMETANIDE 1 MG: 0.25 INJECTION INTRAMUSCULAR; INTRAVENOUS at 08:04

## 2018-02-09 RX ADMIN — DULOXETINE HYDROCHLORIDE 30 MG: 30 CAPSULE, DELAYED RELEASE ORAL at 08:05

## 2018-02-09 RX ADMIN — POTASSIUM CHLORIDE 10 MEQ: 750 CAPSULE, EXTENDED RELEASE ORAL at 08:05

## 2018-02-09 RX ADMIN — INSULIN LISPRO 4 UNITS: 100 INJECTION, SOLUTION INTRAVENOUS; SUBCUTANEOUS at 17:36

## 2018-02-09 RX ADMIN — INSULIN LISPRO 2 UNITS: 100 INJECTION, SOLUTION INTRAVENOUS; SUBCUTANEOUS at 13:16

## 2018-02-09 RX ADMIN — HYDRALAZINE HYDROCHLORIDE 50 MG: 50 TABLET, FILM COATED ORAL at 05:57

## 2018-02-09 RX ADMIN — HYDROCODONE BITARTRATE AND ACETAMINOPHEN 1 TABLET: 5; 325 TABLET ORAL at 21:05

## 2018-02-09 ASSESSMENT — PAIN SCALES - GENERAL
PAINLEVEL_OUTOF10: 6
PAINLEVEL_OUTOF10: 2

## 2018-02-09 NOTE — CARE COORDINATION
Case Management Initial Discharge Plan  Patricia Calzada,         Readmission Risk              Readmission Risk:        24.75       Age 72 or Greater:  1    Admitted from SNF or Requires Paid or Family Care:  0    Currently has CHF,COPD,ARF,CRI,or is on dialysis:  4    Takes more than 5 Prescription Medications:  4    Takes Digoxin,Insulin,Anticoagulants,Narcotics or ASA/Plavix:  2    [address] in Past 12 Months:  10    On Disability:  0    Patient Considers own Health:  3.75            Met with:patient to discuss discharge plans.    Information verified: address, contacts, phone number, , insurance Yes  PCP: Ignacio Martínez MD  Date of last visit: yesterday    Insurance Provider: medicare and [facility]     Discharge Planning  Current Residence:  Private villa in senior complex ( 55+ community )  Living Arrangements:  ETHEL Herrera has 0 stories/0 stairs to climb  Support Systems:  Children and sister Ksenia Taylor at [phone]    Current Services PTA:  Home care/wound care  Agency: [address] [facility]   Patient able to perform ADL's:Independent  DME in home:  Cane   DME used to aid ambulation prior to admission:   cane  DME used during admission:  Cane and dressing changes    Potential Assistance Needed:  N/A    Pharmacy: [facility] on [address] for short term and express rx for mail in    Potential Assistance Purchasing Medications:  No  Does patient want to participate in local refill/ meds to beds program?  N/A    Patient agreeable to home care: Yes  Freedom of choice provided:  yes      Type of Home Care Services:  None  Patient expects to be discharged to:  home    Prior SNF/Rehab Placement and Facility: none  Agreeable to SNF/Rehab: No  [place] of choice provided: n/a   Evaluation: n/a    Expected Discharge date:  18  Follow Up Appointment: Best Day/ Time: Tuesday AM    Transportation provider: per family and sister  Transportation arrangements needed for discharge: No    Discharge Plan:   Patient lives in senior community and very independent. She has used [facility] in past and she is now [facility] under vascular Dr William Durán. Patient does not feel need for home care at this time but will watch her work up . Currently waiting on cultures to see if atb needs.  Patient admitted with acute CHF so will see if CHF clinic in her best interest or home care potentially       Electronically signed by Nona Zimmerman RN on 2/9/18 at 12:57 PM

## 2018-02-09 NOTE — PROGRESS NOTES
methicillin resistant Staphylococcus aureus (MRSA) ([facility]    Weakness of left arm    Endocarditis determined by echocardiography    C[facility]bral septic emboli ([facility])    Lumbar disc herniation    Meningitis, staphylococcal    Cellulitis of right lower leg    Ulcer of right lower leg ([facility])    Bilateral edema of lower extremity    Diabetes mellitus type 2, uncontrolled [facility]    Diabetic polyneuropathy associated with type 2 diabetes mellitus ([facility])    Chronic renal failure, stage 3 (moderate)    Acute on chronic diastolic heart failure ([facility]    DM type 2 (diabetes mellitus, type 2) ([facility])    CKD (chronic kidney disease) stage 3, GFR 30-59 ml/min    Edema    CHF (congestive heart failure), NYHA class I, acute on chronic, combined ([facility]    Acute on chronic diastolic congestive heart failure (HCC)    Stage 4 chronic kidney disease ([facility]    Cellulitis of both lower extremities    Uncontrolled type 2 diabetes mellitus ([facility]    Morbid obesity with BMI of 40.0-44.9, adult ([facility]       Measurements:     02/09/18 1459   Wound 08/18/17 Venous ulcer Leg Lateral;Right   Date First Assessed/Time First Assessed: 08/18/17 0845   Wound Type: Venous ulcer  Wound Event: Blister  Location: Leg  Wound Location Orientation: Lateral;Right  Pre-existing: Yes  Photo Taken: No   Wound Type Wound   Wound Venous   Dressing Status Clean;Changed   Dressing Changed Changed/New   Dressing/Treatment ABD; Ace Wrap;Dry dressing;Non adherent   Wound Cleansed Rinsed/Irrigated with saline   Dressing Change Due 02/10/18   Wound Length (cm) 2 cm   Wound Width (cm) 3.2 cm   Calculated Wound Size (cm^2) (l*w) 6.4 cm^2   Change in Wound Size % (l*w) 57.33   Wound Assessment Dry;Fibrin;Pink   Drainage Amount None   Odor None   Pink%Wound Bed 50   Yellow%Wound Bed 50   Wound 08/18/17 Venous ulcer Pretibial Right 2 open blisters   Date First Assessed: 08/18/17   Wound Type: Venous ulcer  Location: Pretibial  Wound Location Orientation: Right  Wound Description (Comments): 2 open blisters  Pre-existing: Yes  Photo Taken: No   Wound Type Wound   Wound Venous   Dressing Status Clean;Changed   Dressing Changed Changed/New   Dressing/Treatment ABD; Ace Wrap;Dry dressing;Non adherent   Wound Cleansed Rinsed/Irrigated with saline   Dressing Change Due 02/10/18   Wound Length (cm) 6 cm   Wound Width (cm) 5.4 cm   Calculated Wound Size (cm^2) (l*w) 32.4 cm^2   Change in Wound Size % (l*w) -20622   Wound Assessment Clean;Painful;Pink   Drainage Amount Scant   Drainage Description Serous   Odor None   Nicki-wound Assessment Hyperpigmented; Maceration   Pink%Wound Bed 100   Wound 02/09/18 Venous ulcer Leg Right;Medial   Date First Assessed/Time First Assessed: 02/09/18 1459   Wound Type: Venous ulcer  Location: Leg  Wound Location Orientation: Right;Medial  Pre-existing: Yes  Photo Taken: No   Wound Venous   Dressing Status Changed;Clean   Dressing Changed Changed/New   Dressing/Treatment ABD; Ace Wrap;Dry dressing;Non adherent   Wound Cleansed Rinsed/Irrigated with saline   Dressing Change Due 02/10/18   Wound Length (cm) 1.8 cm   Wound Width (cm) 4 cm   Calculated Wound Size (cm^2) (l*w) 7.2 cm^2   Wound Assessment Dry;Fibrin;Pink   Drainage Amount None   Odor None   Nicki-wound Assessment Hyperpigmented   Yellow%Wound Bed 100     Patient reports drainage has decreased. There is no foul odor, redness, or foul drainage. Response to treatment:  Well tolerated by patient. Reports improved pain today. Plan:     Plan of Care:   Irrigate wounds with NS  Apply oil emulsion non-adherent gauze dressings to the open wounds. Wrap BLE with roll gauze and ACE wraps from the base the toes to the knees. Change daily, patient may shower, and reapply dressings.      Specialty Bed Required : No   [] Low Air Loss   [] Pressure Redistribution  [] Fluid Immersion  [] Bariatric  [] Total Pressure Relief  [] Other:     Discharge Plan:  Placement for patient upon discharge: home with support   Hospice Care: No   Patient appropriate for Outpatient [address]: Yes: with Dr Prieto Chan on Wednesday as scheduled. Patient/Caregiver Teaching:  Use compression stockings as ordered at home. If wraps or stockings feel too tight, elevate your legs.    [] Indicates understanding       [] Needs reinforcement  [] Unsuccessful      [x] Verbal Understanding  [] Demonstrated understanding       [] No evidence of learning  [] Refused teaching         [] N/A       Electronically signed by Agatha Garcia RN, CWON on 2/9/2018 at 3:22 PM

## 2018-02-09 NOTE — CONSULTS
Pt comes in with c/o chronic pancreatitis flare up that began 2 days ago. Pt discharged from hospital about 10 days ago after treatment of pancreatitis. Symptoms returned 2 days ago--vomiting and abdominal pain. Alert and oriented. Vss.    performed 8/18/17      PSH:   Past Surgical History:   Procedure Laterality Date    AORTIC VALVE REPLACEMENT  july 2015    CARDIAC CATHETERIZATION  2014    TONSILLECTOMY         Family HX:   Family History   Problem Relation Age of Onset    Adopted: Yes    Other Mother      adopted        Social HX:   Social History     Social History    Marital status:      Spouse name: N/A    Number of children: 0    Years of education: 12     Occupational History    Not on file. Social History Main Topics    Smoking status: Former Smoker     Packs/day: 1.00     Years: 47.00     Types: Cigarettes     Quit date: 1/29/2009    Smokeless tobacco: Never Used      Comment: Quit smoking 6 years ago/ Started smoking at 25    Alcohol use No      Comment: 2 beers once a month    Drug use: No    Sexual activity: Yes     Partners: Male     Other Topics Concern    Not on file     Social History Narrative    No narrative on file        Allergies: Allergies   Allergen Reactions    Ambien [Zolpidem Tartrate]      Loss of consciousness        Meds:  Prior to Admission medications    Medication Sig Start Date End Date Taking?  Authorizing Provider   insulin aspart (NOVOLOG FLEXPEN) 100 UNIT/ML injection pen Inject into the skin 3 times daily (before meals)    Historical Provider, MD   gabapentin (NEURONTIN) 100 MG capsule Take 1 capsule by mouth 3 times daily 8/22/17   Evelyn Conway MD   bumetanide (BUMEX) 2 MG tablet Take 0.5 tablets by mouth daily 8/22/17   Evelyn Conway MD   albuterol-ipratropium (COMBIVENT RESPIMAT)  MCG/ACT AERS inhaler Inhale 1 puff into the lungs every 6 hours 8/22/17   Evelyn Conway MD   carvedilol (COREG) 12.5 MG tablet Take 12.5 mg by mouth 2 times daily    Historical Provider, MD   glipiZIDE (GLUCOTROL) 5 MG tablet Take 2.5 mg by mouth daily    Historical Provider, MD   hydrALAZINE (APRESOLINE) 100 MG tablet Take 100 mg by mouth 2 times daily    Historical Provider, MD insulin glargine (LANTUS) 100 UNIT/ML injection vial Inject 30 Units into the skin nightly     Historical Provider, MD   isosorbide mononitrate (IMDUR) 30 MG extended release tablet Take 30 mg by mouth daily    Historical Provider, MD   DULoxetine (CYMBALTA) 30 MG extended release capsule Take 30 mg by mouth daily    Historical Provider, MD   atorvastatin (LIPITOR) 40 MG tablet Take 40 mg by mouth daily    Historical Provider, MD   ferrous sulfate (FE TABS) 325 (65 FE) MG EC tablet Take 1 tablet by mouth daily (with breakfast) 2/23/16   Raj Mcduffie MD   HYDROcodone-acetaminophen (NORCO) 5-325 MG per tablet Take 1 tablet by mouth every 8 hours as needed for Pain    Historical Provider, MD   aspirin 81 MG tablet Take 81 mg by mouth daily. Historical Provider, MD        ROS: Negative fever/chills. Negative cough. Negative abdominal pain. Negative NVD. Negative dysuria    Exam: VS: BP (!) 124/51   Pulse 65   Temp 98.4 °F (36.9 °C) (Oral)   Resp 18   Ht 5' 5\" (1.651 m)   Wt 240 lb (108.9 kg)   SpO2 98%   BMI 39.94 kg/m²      Wt.  Weight change:     I/O: In: 720 [P.O.:720]  Out: 1100 [Urine:1100]     Monitor: SR. PVCs    General Appearance:AOX3, NAD  Skin: warm, dry  Pulmonary/Chest:Diminished at bases with bibasilar rales  Cardiovascular: S1S2, RRR, AS radiating to neck, negative gallop, minimal JVD  Abdomen: BS present, soft, nontender  Extremities: 3+ peripheral edema    Labs:   CBC with Differential:    Lab Results   Component Value Date    WBC 6.6 02/08/2018    RBC 3.61 02/08/2018    RBC 3.84 02/07/2018    HGB 10.3 02/08/2018    HCT 31.4 02/08/2018     02/08/2018    MCV 87.0 02/08/2018    MCH 28.6 02/08/2018    MCHC 32.8 02/08/2018    RDW 14.3 02/08/2018    NRBC 0 02/07/2018    LYMPHOPCT 27 02/08/2018    LYMPHOPCT 29.9 02/07/2018    MONOPCT 7 02/08/2018    MONOPCT 7.4 02/07/2018    EOSPCT 2.4 10/17/2017    BASOPCT 1 02/08/2018    BASOPCT 0.9 02/07/2018    MONOSABS 0.40 02/08/2018    MONOSABS 0.5 02/07/2018    LYMPHSABS 1.70 02/08/2018    LYMPHSABS 2.1 02/07/2018    EOSABS 0.20 02/08/2018    EOSABS 0.2 02/07/2018    BASOSABS 0.00 02/08/2018    DIFFTYPE NOT REPORTED 02/08/2018     CMP:    Lab Results   Component Value Date     02/09/2018    K 4.0 02/09/2018     02/09/2018    CO2 29 02/09/2018    BUN 49 02/09/2018    CREATININE 2.13 02/09/2018    GFRAA 28 02/09/2018    LABGLOM 23 02/09/2018    GLUCOSE 142 02/09/2018    GLUCOSE 124 02/07/2018    PROT 7.9 02/07/2018    LABALBU 4.1 02/08/2018    LABALBU 4.4 02/07/2018    CALCIUM 9.2 02/09/2018    BILITOT 0.6 02/07/2018    ALKPHOS 65 02/07/2018    AST 24 02/07/2018    ALT 20 02/07/2018     Magnesium:    Lab Results   Component Value Date    MG 1.9 02/08/2018     Phosphorus:    Lab Results   Component Value Date    PHOS 3.2 02/07/2018       ProBNP 860   TSH 2.04  FT4  1.10    CXR:   Borderline cardiomegaly.  Central vasculature is mildly plethoric.  No focal   consolidation or effusions are noted. EKG: No EKG completed this admission    A/P: 1. CHF  -Secondary to acute/chronic LV diastolic dysfunction, valvular disease, and dietary noncomplianace. On Bumex 1mg BID. Will review  -AC diet, IO, Wt, BMP. Check echo    2. CAD  -Non-obstructive disease. Stable. No angina  -On ASA, B. Blocker, Nitrate, and Statin    3. HTN  -BP controlled    4.  HC  -On Statin    Will discuss with Dr. Demetri Laureano    Electronically signed by Kaushik Fonseca CNP on 2/9/2018 at 9:53 AM

## 2018-02-09 NOTE — FLOWSHEET NOTE
Found this delightful patient sitting up in bed upon arrival.  Patient supported by her sister Kaushik Fernandez. Provided active listening to patient. Offered prayers and blessing for healing. Patient thanked writer for visit. Spiritual care will follow as needed.

## 2018-02-09 NOTE — CONSULTS
Max:68    24HR INTAKE/OUTPUT:    Intake/Output Summary (Last 24 hours) at 02/09/18 0956  Last data filed at 02/09/18 0610   Gross per 24 hour   Intake              720 ml   Output             1100 ml   Net             -380 ml     Patient Vitals for the past 96 hrs (Last 3 readings):   Weight   02/08/18 1656 240 lb (108.9 kg)       Physical Exam:  GENERAL APPEARANCE: Alert and cooperative, and appears to be in no acute distress. HEAD: normocephalic  EYES: PERRL, EOMI. Not pale, anicteric   NOSE:  No nasal discharge. THROAT:  Oral cavity and pharynx normal. Moist  CARDIAC: Normal S1 and S2. No S3, S4 or murmurs. Rhythm is regular. LUNGS: Clear to auscultation and percussion without rales, rhonchi, wheezing or diminished breath sounds. NECK: Neck supple, non-tender without lymphadenopathy, masses or thyromegaly. BACK: Examination of the spine reveals normal gait and posture, no spinal deformity, symmetry of spinal muscles, without tenderness, decreased range of motion or muscular spasm  MUSKULOSKELETAL: Adequately aligned spine. No joint erythema or tenderness. EXTREMITIES: 2-3 + edema. Peripheral pulses intact.    NEURO:non focal      Labs:   CBC:  Recent Labs      02/07/18   1224  02/08/18   1712   WBC  6.9  6.6   RBC  3.84  3.61*   HGB  10.8*  10.3*   HCT  34.6*  31.4*   MCV  90.1  87.0   MCH  28.1  28.6   MCHC  31.2*  32.8   RDW  13.3  14.3   PLT  173  186   MPV   --   7.9      BMP: Recent Labs      02/07/18   1224  02/08/18   1712  02/09/18   0349   NA  144  146*  142   K  4.5  3.8  4.0   CL  104  102  100   CO2  31  29  29   BUN  50*  49*  49*   CREATININE  2.12*  1.96*  2.13*   GLUCOSE  124*  168*  142*   CALCIUM  10.0  9.6  9.2      Phosphorus:    Recent Labs      02/07/18   1224   PHOS  3.2     Magnesium:   Recent Labs      02/07/18   1224  02/08/18   1712   MG  2.1  1.9     Albumin:   Recent Labs      02/07/18   1224  02/08/18   1712   LABALBU  4.4  4.1       IRON:  No results for input(s): IRON in

## 2018-02-10 LAB
ANION GAP SERPL CALCULATED.3IONS-SCNC: 12 MMOL/L (ref 9–17)
BUN BLDV-MCNC: 51 MG/DL (ref 8–23)
BUN/CREAT BLD: 23 (ref 9–20)
CALCIUM SERPL-MCNC: 9.4 MG/DL (ref 8.6–10.4)
CHLORIDE BLD-SCNC: 102 MMOL/L (ref 98–107)
CO2: 30 MMOL/L (ref 20–31)
CREAT SERPL-MCNC: 2.21 MG/DL (ref 0.5–0.9)
CULTURE: ABNORMAL
CULTURE: ABNORMAL
DIRECT EXAM: ABNORMAL
DIRECT EXAM: ABNORMAL
GFR AFRICAN AMERICAN: 26 ML/MIN
GFR NON-AFRICAN AMERICAN: 22 ML/MIN
GFR SERPL CREATININE-BSD FRML MDRD: ABNORMAL ML/MIN/{1.73_M2}
GFR SERPL CREATININE-BSD FRML MDRD: ABNORMAL ML/MIN/{1.73_M2}
GLUCOSE BLD-MCNC: 154 MG/DL (ref 65–105)
GLUCOSE BLD-MCNC: 165 MG/DL (ref 65–105)
GLUCOSE BLD-MCNC: 193 MG/DL (ref 70–99)
GLUCOSE BLD-MCNC: 207 MG/DL (ref 65–105)
GLUCOSE BLD-MCNC: 241 MG/DL (ref 65–105)
Lab: ABNORMAL
ORGANISM: ABNORMAL
POTASSIUM SERPL-SCNC: 4.1 MMOL/L (ref 3.7–5.3)
SODIUM BLD-SCNC: 144 MMOL/L (ref 135–144)
SPECIMEN DESCRIPTION: ABNORMAL
SPECIMEN DESCRIPTION: ABNORMAL
STATUS: ABNORMAL

## 2018-02-10 PROCEDURE — 2500000003 HC RX 250 WO HCPCS: Performed by: INTERNAL MEDICINE

## 2018-02-10 PROCEDURE — 6370000000 HC RX 637 (ALT 250 FOR IP): Performed by: INTERNAL MEDICINE

## 2018-02-10 PROCEDURE — 6360000002 HC RX W HCPCS: Performed by: INTERNAL MEDICINE

## 2018-02-10 PROCEDURE — 99221 1ST HOSP IP/OBS SF/LOW 40: CPT | Performed by: INTERNAL MEDICINE

## 2018-02-10 PROCEDURE — 80048 BASIC METABOLIC PNL TOTAL CA: CPT

## 2018-02-10 PROCEDURE — 94761 N-INVAS EAR/PLS OXIMETRY MLT: CPT

## 2018-02-10 PROCEDURE — 82947 ASSAY GLUCOSE BLOOD QUANT: CPT

## 2018-02-10 PROCEDURE — 2580000003 HC RX 258: Performed by: INTERNAL MEDICINE

## 2018-02-10 PROCEDURE — 94640 AIRWAY INHALATION TREATMENT: CPT

## 2018-02-10 PROCEDURE — 36415 COLL VENOUS BLD VENIPUNCTURE: CPT

## 2018-02-10 PROCEDURE — 2060000000 HC ICU INTERMEDIATE R&B

## 2018-02-10 RX ORDER — BUMETANIDE 0.25 MG/ML
1 INJECTION, SOLUTION INTRAMUSCULAR; INTRAVENOUS 2 TIMES DAILY
Status: DISCONTINUED | OUTPATIENT
Start: 2018-02-10 | End: 2018-02-11

## 2018-02-10 RX ADMIN — FERROUS SULFATE TAB EC 325 MG (65 MG FE EQUIVALENT) 325 MG: 325 (65 FE) TABLET DELAYED RESPONSE at 08:25

## 2018-02-10 RX ADMIN — BUMETANIDE 2 MG: 0.25 INJECTION INTRAMUSCULAR; INTRAVENOUS at 08:25

## 2018-02-10 RX ADMIN — HYDRALAZINE HYDROCHLORIDE 50 MG: 50 TABLET, FILM COATED ORAL at 22:07

## 2018-02-10 RX ADMIN — IPRATROPIUM BROMIDE AND ALBUTEROL SULFATE 1 AMPULE: .5; 3 SOLUTION RESPIRATORY (INHALATION) at 08:05

## 2018-02-10 RX ADMIN — HYDRALAZINE HYDROCHLORIDE 50 MG: 50 TABLET, FILM COATED ORAL at 12:32

## 2018-02-10 RX ADMIN — ATORVASTATIN CALCIUM 40 MG: 40 TABLET, FILM COATED ORAL at 22:06

## 2018-02-10 RX ADMIN — IPRATROPIUM BROMIDE AND ALBUTEROL SULFATE 1 AMPULE: .5; 3 SOLUTION RESPIRATORY (INHALATION) at 20:31

## 2018-02-10 RX ADMIN — GABAPENTIN 100 MG: 100 CAPSULE ORAL at 08:25

## 2018-02-10 RX ADMIN — CARVEDILOL 12.5 MG: 12.5 TABLET, FILM COATED ORAL at 08:25

## 2018-02-10 RX ADMIN — DULOXETINE HYDROCHLORIDE 30 MG: 30 CAPSULE, DELAYED RELEASE ORAL at 08:25

## 2018-02-10 RX ADMIN — INSULIN LISPRO 2 UNITS: 100 INJECTION, SOLUTION INTRAVENOUS; SUBCUTANEOUS at 08:25

## 2018-02-10 RX ADMIN — IPRATROPIUM BROMIDE AND ALBUTEROL SULFATE 1 AMPULE: .5; 3 SOLUTION RESPIRATORY (INHALATION) at 15:46

## 2018-02-10 RX ADMIN — BUMETANIDE 1 MG: 0.25 INJECTION INTRAMUSCULAR; INTRAVENOUS at 22:08

## 2018-02-10 RX ADMIN — POTASSIUM CHLORIDE 10 MEQ: 750 CAPSULE, EXTENDED RELEASE ORAL at 22:06

## 2018-02-10 RX ADMIN — INSULIN LISPRO 2 UNITS: 100 INJECTION, SOLUTION INTRAVENOUS; SUBCUTANEOUS at 22:09

## 2018-02-10 RX ADMIN — VANCOMYCIN HYDROCHLORIDE 1500 MG: 500 INJECTION, POWDER, LYOPHILIZED, FOR SOLUTION INTRAVENOUS at 06:01

## 2018-02-10 RX ADMIN — INSULIN LISPRO 4 UNITS: 100 INJECTION, SOLUTION INTRAVENOUS; SUBCUTANEOUS at 12:32

## 2018-02-10 RX ADMIN — GABAPENTIN 100 MG: 100 CAPSULE ORAL at 12:32

## 2018-02-10 RX ADMIN — CARVEDILOL 12.5 MG: 12.5 TABLET, FILM COATED ORAL at 22:07

## 2018-02-10 RX ADMIN — Medication 30 UNITS: at 22:10

## 2018-02-10 RX ADMIN — GABAPENTIN 100 MG: 100 CAPSULE ORAL at 22:07

## 2018-02-10 RX ADMIN — POTASSIUM CHLORIDE 10 MEQ: 750 CAPSULE, EXTENDED RELEASE ORAL at 08:25

## 2018-02-10 RX ADMIN — INSULIN LISPRO 2 UNITS: 100 INJECTION, SOLUTION INTRAVENOUS; SUBCUTANEOUS at 17:00

## 2018-02-10 RX ADMIN — IPRATROPIUM BROMIDE AND ALBUTEROL SULFATE 1 AMPULE: .5; 3 SOLUTION RESPIRATORY (INHALATION) at 02:09

## 2018-02-10 RX ADMIN — ASPIRIN 81 MG: 81 TABLET, COATED ORAL at 08:25

## 2018-02-10 RX ADMIN — ISOSORBIDE MONONITRATE 30 MG: 30 TABLET ORAL at 08:25

## 2018-02-10 RX ADMIN — HYDRALAZINE HYDROCHLORIDE 50 MG: 50 TABLET, FILM COATED ORAL at 05:52

## 2018-02-10 RX ADMIN — CEFTRIAXONE SODIUM 1 G: 10 INJECTION, POWDER, FOR SOLUTION INTRAVENOUS at 23:32

## 2018-02-10 RX ADMIN — HYDROCODONE BITARTRATE AND ACETAMINOPHEN 1 TABLET: 5; 325 TABLET ORAL at 22:26

## 2018-02-10 ASSESSMENT — PAIN SCALES - GENERAL
PAINLEVEL_OUTOF10: 6
PAINLEVEL_OUTOF10: 2

## 2018-02-10 NOTE — PROGRESS NOTES
Cardiovascular progress Note          Patient name: London Allred    YOB: 1945  Date of admission:  2/8/2018       Patient seen, examined. Previous clinical entries reviewed. All available laboratory, imaging and ancillary data reviewed. Subjective:      Ms. Chris Lance was admitted with heart failure exacerbation. Her breathing is better. Her lower extremity edema is not much changed according to her. She denies any chest pain, palpitations,orthopnea or PND. Systems review:  Constitutional: No fever/chills. HENT: No headache, neck pain or neck stiffness. No sore throat or dysphagia. Gastrointestinal: No abdominal pain, nausea or vomiting. Cardiac: As Above  Respiratory: As above  Neurologic: No new focal weakness or numbness  Psychiatric: Normal mood and mentation       Examination:   Vitals: BP (!) 151/57   Pulse 69   Temp 98.4 °F (36.9 °C) (Oral)   Resp 18   Ht 5' 5\" (1.651 m)   Wt 250 lb 11.2 oz (113.7 kg)   SpO2 96%   BMI 41.72 kg/m²     Intake/Output Summary (Last 24 hours) at 02/10/18 0857  Last data filed at 02/10/18 0617   Gross per 24 hour   Intake              440 ml   Output             2600 ml   Net            -2160 ml       General appearance: Comfortable in no apparent distress. HEENT: No pallor. No icterus  Neck: Supple. Lungs:Generally decreased breath sounds  Heart: S1,S2 + TELLY  Abdomen: Soft  Extremities: + peripheral edema  Skin: No obvious rashes. Musculoskeletal: No obvious deformities. Neurologic: No focal deficits.      Labs/ Ancillary data:     CBC:   Recent Labs      02/07/18   1224  02/08/18   1712   WBC  6.9  6.6   HGB  10.8*  10.3*   PLT  173  186     BMP:  Recent Labs      02/08/18   1712  02/09/18   0349  02/10/18   0345   NA  146*  142  144   K  3.8  4.0  4.1   CL  102  100  102   CO2  29  29  30   BUN  49*  49*  51*   CREATININE  1.96*  2.13*  2.21*   GLUCOSE  168*  142*  193*     Hepatic: Recent Labs      02/07/18   1224   AST

## 2018-02-10 NOTE — PROGRESS NOTES
10/28/2014    Pneumonia many years ago    Sleep apnea     no machine    SOB (shortness of breath)     Type 2 diabetes mellitus with proteinuria or albuminuria 10/23/2014       Past Surgical History:        Procedure Laterality Date    AORTIC VALVE REPLACEMENT  july 2015    CARDIAC CATHETERIZATION  2014    TONSILLECTOMY         Current Medications:      bumetanide (BUMEX) injection 1 mg BID   aspirin EC tablet 81 mg Daily   atorvastatin (LIPITOR) tablet 40 mg Daily   carvedilol (COREG) tablet 12.5 mg BID   DULoxetine (CYMBALTA) extended release capsule 30 mg Daily   ferrous sulfate EC tablet 325 mg Daily with breakfast   gabapentin (NEURONTIN) capsule 100 mg TID   hydrALAZINE (APRESOLINE) tablet 50 mg 3 times per day   HYDROcodone-acetaminophen (NORCO) 5-325 MG per tablet 1 tablet Q6H PRN   insulin glargine (LANTUS) injection vial 30 Units Nightly   isosorbide mononitrate (IMDUR) extended release tablet 30 mg Daily   insulin lispro (HUMALOG) injection vial 0-12 Units TID WC   insulin lispro (HUMALOG) injection vial 0-6 Units Nightly   potassium chloride (MICRO-K) extended release capsule 10 mEq BID   glucose (GLUTOSE) 40 % oral gel 15 g PRN   dextrose 50 % solution 12.5 g PRN   glucagon (rDNA) injection 1 mg PRN   dextrose 5 % solution PRN   ipratropium-albuterol (DUONEB) nebulizer solution 1 ampule Q6H   cefTRIAXone (ROCEPHIN) 1 g in sterile water 10 mL IV syringe Q24H     sodium chloride flush 0.9 % injection 10 mL 2 times per day   sodium chloride flush 0.9 % injection 10 mL PRN       Allergies:  Ambien [zolpidem tartrate]    Social History:   Social History     Social History    Marital status:      Spouse name: N/A    Number of children: 0    Years of education: 12     Occupational History    Not on file.      Social History Main Topics    Smoking status: Former Smoker     Packs/day: 1.00     Years: 47.00     Types: Cigarettes     Quit date: 1/29/2009    Smokeless tobacco: Never Used 10.3*   HCT  31.4*   MCV  87.0   MCH  28.6   MCHC  32.8   RDW  14.3   PLT  186   MPV  7.9      BMP:   Recent Labs      02/08/18   1712  02/09/18   0349  02/10/18   0345   NA  146*  142  144   K  3.8  4.0  4.1   CL  102  100  102   CO2  29  29  30   BUN  49*  49*  51*   CREATININE  1.96*  2.13*  2.21*   GLUCOSE  168*  142*  193*   CALCIUM  9.6  9.2  9.4      Phosphorus:    No results for input(s): PHOS in the last 72 hours. Magnesium:   Recent Labs      02/08/18   1712   MG  1.9     Albumin:   Recent Labs      02/08/18   1712   LABALBU  4.1       IRON:  No results for input(s): IRON in the last 72 hours. Iron Saturation:  Invalid input(s): PERCENTFE  TIBC:  No results for input(s): TIBC in the last 72 hours. FERRITIN:  No results for input(s): FERRITIN in the last 72 hours. SPEP: No results for input(s): SPEP in the last 72 hours. No results for input(s): PROT, ALBCAL, ALBCAL, ALBPCT, LABALPH, A1PCT, LABALPH, A2PCT, LABBETA, BETAPCT, GAMGLOB, GGPCT, PATH in the last 72 hours. UPEP: No results for input(s): TPU in the last 72 hours. Urine Sodium:  No results for input(s): MICKI in the last 72 hours. Urine Potassium: No results for input(s): KUR in the last 72 hours. Urine Chloride: Invalid input(s): CLU  Urine Ph:  Invalid input(s): PO4U  Urine Osmolarity: No results for input(s): OSMOU in the last 72 hours. Urine Creatinine:    Recent Labs      02/08/18   1630   LABCREA  29.3     Urine Eosinophils: Invalid input(s): EOSU  Urine Protein:  No results for input(s): TPU in the last 72 hours. Urinalysis:  No results for input(s): Jerone Jacob, PHUR, LABCAST, WBCUA, RBCUA, MUCUS, TRICHOMONAS, YEAST, BACTERIA, CLARITYU, SPECGRAV, LEUKOCYTESUR, UROBILINOGEN, BILIRUBINUR, BLOODU, GLUCOSEU, KETUA, AMORPHOUS in the last 72 hours. Radiology:  Reviewed as available. Assessment:  1. Fluid re tension worsening peripheral edema, Hx of CHF with preserved EF, diastolic dysfunction.   2. CKD stage 3b most likely

## 2018-02-10 NOTE — CONSULTS
[facility] - Initial Consult Note  Today's Date and Time: 2/11/2018, 10:14 AM    Impression :   1. Bilateral leg lymphedema  2. The diagnosis stasis ulcers on the right leg with weeping fluid  3. Colonization of the skin with Pseudomonas    Recommendations:   · Continue efforts to decrease leg edema  · Elevate the legs with pillows  · [employer] wraps to the right leg if tolerated by the patient  · Apply collagen preparation to the skin ulcers and dry dressings. Fibracol or equivalent BID  · No need for antibiotic Treatment    Medical Decision Making/Summary/Discussion:   ·   Infection Control Recommendations   · [place] Precautions    Antimicrobial Stewardship Recommendations     · Discontinuation of therapy  Coordination of Outpatient Care:   · Estimated Length of IV antimicrobials:None  · Patient will need Midline Catheter Insertion: No  · Patient will need PICC line Insertion:No  · Patient will need: Home IV , [place],  SNF,  LTAC: No  · Patient will need outpatient wound care:Yes    Chief complaint/reason for consultation:   · Cellulitis right leg      History of Present Illness:   Cain Paul is a 67y.o.-year-old  female who was initially admitted on 2/8/2018. Patient seen at the request of . The patient suffers from congestive heart failure and was admitted to the hospital because of an exacerbation of congestive heart failure. She indicates that the associated with this problem she has noticed swelling of both lower extremities. This has been more evident on the right side where she has chronic Venous stasis ulcers which are being managed at the wound care center from the [address] system at [facility]. The patient has a long list of medical problems as detailed in past history.     The patient indicates that she has been giving her cream and dressings to apply to her legs but she is uncertain as to what these glargine  30 Units Subcutaneous Nightly    isosorbide mononitrate  30 mg Oral Daily    insulin lispro  0-12 Units Subcutaneous TID WC    insulin lispro  0-6 Units Subcutaneous Nightly    potassium chloride  10 mEq Oral BID    ipratropium-albuterol  1 ampule Inhalation Q6H    cefTRIAXone (ROCEPHIN) IV  1 g Intravenous Q24H       Social History:     Social History     Social History    Marital status:      Spouse name: N/A    Number of children: 0    Years of education: 12     Occupational History    Not on file. Social History Main Topics    Smoking status: Former Smoker     Packs/day: 1.00     Years: 47.00     Types: Cigarettes     Quit date: 1/29/2009    Smokeless tobacco: Never Used      Comment: Quit smoking 6 years ago/ Started smoking at 25    Alcohol use No      Comment: 2 beers once a month    Drug use: No    Sexual activity: Yes     Partners: Male     Other Topics Concern    Not on file     Social History Narrative    No narrative on file       Family History:     Family History   Problem Relation Age of Onset    Adopted: Yes    Other Mother      adopted        Allergies:   Ambien [zolpidem tartrate]     Review of Systems:   Constitutional: No fevers or chills. No systemic complaints  Head: No headaches  Eyes: No double vision or blurry vision. No conjunctival inflammation. ENT: No sore throat or runny nose. . No hearing loss, tinnitus or vertigo. Cardiovascular: No chest pain or palpitations. shortness of breath. HALE  Lung: shortness of breath. No sputum production  Abdomen: No nausea, vomiting, diarrhea, or abdominal pain. Ludger Hare No cramps. Genitourinary: No increased urinary frequency, or dysuria. No hematuria. No suprapubic or CVA pain  Musculoskeletal: No muscle aches or pains. No joint effusions, swelling or deformities. Bilateral leg edema  Hematologic: No bleeding or bruising. Neurologic: No headache, weakness, numbness, or tingling.   Integument: Bilateral venous stasis ulcers  Psychiatric: No depression. Endocrine: No polyuria, no polydipsia, no polyphagia. Physical Examination :     Patient Vitals for the past 8 hrs:   BP Temp Temp src Pulse Resp SpO2 Weight   02/11/18 0951 - - - - - 96 % -   02/11/18 0654 120/82 97.9 °F (36.6 °C) Oral 74 18 98 % 249 lb (112.9 kg)   02/11/18 0530 (!) 141/56 - - 69 - - -     General Appearance: Awake, alert, and in no apparent distress. Morbid obesity  Head:  Normocephalic, no trauma  Eyes: Pupils equal, round, reactive to light and accommodation; extraocular movements intact; sclera anicteric; conjunctivae pink. No embolic phenomena. ENT: Oropharynx clear, without erythema, exudate, or thrush. No tenderness of sinuses. Mouth/throat: mucosa pink and moist. No lesions. Dentition in good repair. Neck:Supple, without lymphadenopathy. Thyroid normal, No bruits. Pulmonary/Chest: Crackles and rales at the bases  Cardiovascular: Regular rate and rhythm without murmurs, rubs, or gallops. Abdomen: Soft, non tender. Bowel sounds normal. No organomegaly. Morbidly obese  All four Extremities: Both legs with lymphedema. Right leg with venous stasis ulcers  Neurologic: No gross sensory or motor deficits. Skin: Warm and dry with good turgor. Signs of peripheral arterial and venous insufficiency. Medical Decision Making -Laboratory:   I have independently reviewed/ordered the following labs:    CBC with Differential:   Recent Labs      02/08/18   1712   WBC  6.6   HGB  10.3*   HCT  31.4*   PLT  186   LYMPHOPCT  27   MONOPCT  7     BMP:   Recent Labs      02/08/18   1712   02/10/18   0345  02/11/18   0338   NA  146*   < >  144  140   K  3.8   < >  4.1  3.8   CL  102   < >  102  99   CO2  29   < >  30  31   BUN  49*   < >  51*  47*   CREATININE  1.96*   < >  2.21*  2.06*   MG  1.9   --    --    --     < > = values in this interval not displayed.      Hepatic Function Panel:   Recent Labs      02/08/18   1712   LABALBU  4.1     No results for input(s): RPR in the last 72 hours. No results for input(s): HIV in the last 72 hours. No results for input(s): BC in the last 72 hours. Lab Results   Component Value Date    MUCUS NOT REPORTED 03/15/2016    RBC 3.61 02/08/2018    RBC 3.84 02/07/2018    TRICHOMONAS NOT REPORTED 03/15/2016    WBC 6.6 02/08/2018    YEAST NOT REPORTED 03/15/2016    TURBIDITY TURBID 03/15/2016     Lab Results   Component Value Date    CREATININE 2.06 02/11/2018    GLUCOSE 162 02/11/2018    GLUCOSE 124 02/07/2018       Medical Decision Making-Imaging:       Medical Decision Making-Other:   Wound culture superficial includes gram stain1/24/2018  [address]  Component Name Value Ref Range   Specimen description WOUND SWAB RIGHT LOWER LEG Y    Gram Stain Result 0 WHITE BLOOD CELLS/LPF     Gram Stain Result 1 to 9 SQUAMOUS EPITHELIAL CELLS/LPF     Gram Stain Result FEW GRAM NEGATIVE RODS     Culture MODERATE PSEUDOMONAS AERUGINOSA     Culture FEW PSEUDOMONAS AERUGINOSA VARIANT     Culture RARE NON LACTOSE FERMENTING GRAM NEGATIVE RODS NOT PSEUDOMONAS AERUGINOSA CALL MICROBIOLOGY IF FURTHER WORK DESIRED. ISOLATES HELD FOR 7 DAYS PAST FINAL DATE.     Culture RARE YEAST CALL MICROBIOLOGY IF FURTHER WORK DESIRED.  ISOLATES HELD FOR 7 DAYS PAST FINAL DATE.     Culture ALONG WITH RARE NORMAL SKIN ALEXANDER     Report status 01/28/2018 FINAL     Organism PSEUDOMONAS AERUGINOSA     Organism PSEUDOMONAS AERUGINOSA     Specimen     Wound Swab        Organism Antibiotic Method Susceptibility   Pseudomonas Aeruginosa Amikacin ELIS <=2: Susceptible    Cefepime ELIS <=1: Susceptible    Ciprofloxacin ELIS <=0.25: Susceptible    Gentamicin ELIS <=1: Susceptible    Levofloxacin ELIS 0.5: Susceptible    Meropenem ELIS <=0.25: Susceptible    Piperacillin ELIS <=4: Susceptible    Tobramycin ELIS <=1: Susceptible   Comment: MODERATE PSEUDOMONAS AERUGINOSA   Pseudomonas Aeruginosa Amikacin ELIS <=2: Susceptible    Cefepime ELIS <=1: Susceptible    Ciprofloxacin

## 2018-02-11 LAB
ANION GAP SERPL CALCULATED.3IONS-SCNC: 10 MMOL/L (ref 9–17)
BUN BLDV-MCNC: 47 MG/DL (ref 8–23)
BUN/CREAT BLD: 23 (ref 9–20)
CALCIUM SERPL-MCNC: 9.5 MG/DL (ref 8.6–10.4)
CHLORIDE BLD-SCNC: 99 MMOL/L (ref 98–107)
CO2: 31 MMOL/L (ref 20–31)
CREAT SERPL-MCNC: 2.06 MG/DL (ref 0.5–0.9)
GFR AFRICAN AMERICAN: 29 ML/MIN
GFR NON-AFRICAN AMERICAN: 24 ML/MIN
GFR SERPL CREATININE-BSD FRML MDRD: ABNORMAL ML/MIN/{1.73_M2}
GFR SERPL CREATININE-BSD FRML MDRD: ABNORMAL ML/MIN/{1.73_M2}
GLUCOSE BLD-MCNC: 115 MG/DL (ref 65–105)
GLUCOSE BLD-MCNC: 147 MG/DL (ref 65–105)
GLUCOSE BLD-MCNC: 162 MG/DL (ref 70–99)
GLUCOSE BLD-MCNC: 171 MG/DL (ref 65–105)
GLUCOSE BLD-MCNC: 246 MG/DL (ref 65–105)
POTASSIUM SERPL-SCNC: 3.8 MMOL/L (ref 3.7–5.3)
SODIUM BLD-SCNC: 140 MMOL/L (ref 135–144)

## 2018-02-11 PROCEDURE — 2060000000 HC ICU INTERMEDIATE R&B

## 2018-02-11 PROCEDURE — 6370000000 HC RX 637 (ALT 250 FOR IP): Performed by: INTERNAL MEDICINE

## 2018-02-11 PROCEDURE — 2500000003 HC RX 250 WO HCPCS: Performed by: INTERNAL MEDICINE

## 2018-02-11 PROCEDURE — 94760 N-INVAS EAR/PLS OXIMETRY 1: CPT

## 2018-02-11 PROCEDURE — 94640 AIRWAY INHALATION TREATMENT: CPT

## 2018-02-11 PROCEDURE — 80048 BASIC METABOLIC PNL TOTAL CA: CPT

## 2018-02-11 PROCEDURE — 82947 ASSAY GLUCOSE BLOOD QUANT: CPT

## 2018-02-11 PROCEDURE — 36415 COLL VENOUS BLD VENIPUNCTURE: CPT

## 2018-02-11 PROCEDURE — 6360000002 HC RX W HCPCS: Performed by: INTERNAL MEDICINE

## 2018-02-11 RX ORDER — BUMETANIDE 0.25 MG/ML
1 INJECTION, SOLUTION INTRAMUSCULAR; INTRAVENOUS 2 TIMES DAILY
Status: DISCONTINUED | OUTPATIENT
Start: 2018-02-11 | End: 2018-02-13

## 2018-02-11 RX ORDER — BUMETANIDE 1 MG/1
2 TABLET ORAL DAILY
Status: DISCONTINUED | OUTPATIENT
Start: 2018-02-11 | End: 2018-02-11

## 2018-02-11 RX ADMIN — ISOSORBIDE MONONITRATE 30 MG: 30 TABLET ORAL at 08:29

## 2018-02-11 RX ADMIN — IPRATROPIUM BROMIDE AND ALBUTEROL SULFATE 1 AMPULE: .5; 3 SOLUTION RESPIRATORY (INHALATION) at 20:09

## 2018-02-11 RX ADMIN — GABAPENTIN 100 MG: 100 CAPSULE ORAL at 08:29

## 2018-02-11 RX ADMIN — DULOXETINE HYDROCHLORIDE 30 MG: 30 CAPSULE, DELAYED RELEASE ORAL at 08:29

## 2018-02-11 RX ADMIN — POTASSIUM CHLORIDE 10 MEQ: 750 CAPSULE, EXTENDED RELEASE ORAL at 08:29

## 2018-02-11 RX ADMIN — IPRATROPIUM BROMIDE AND ALBUTEROL SULFATE 1 AMPULE: .5; 3 SOLUTION RESPIRATORY (INHALATION) at 02:16

## 2018-02-11 RX ADMIN — BUMETANIDE 2 MG: 1 TABLET ORAL at 16:59

## 2018-02-11 RX ADMIN — INSULIN LISPRO 2 UNITS: 100 INJECTION, SOLUTION INTRAVENOUS; SUBCUTANEOUS at 12:57

## 2018-02-11 RX ADMIN — HYDRALAZINE HYDROCHLORIDE 50 MG: 50 TABLET, FILM COATED ORAL at 05:37

## 2018-02-11 RX ADMIN — CARVEDILOL 12.5 MG: 12.5 TABLET, FILM COATED ORAL at 21:47

## 2018-02-11 RX ADMIN — IPRATROPIUM BROMIDE AND ALBUTEROL SULFATE 1 AMPULE: .5; 3 SOLUTION RESPIRATORY (INHALATION) at 09:50

## 2018-02-11 RX ADMIN — GABAPENTIN 100 MG: 100 CAPSULE ORAL at 12:58

## 2018-02-11 RX ADMIN — ASPIRIN 81 MG: 81 TABLET, COATED ORAL at 08:29

## 2018-02-11 RX ADMIN — HYDRALAZINE HYDROCHLORIDE 50 MG: 50 TABLET, FILM COATED ORAL at 21:46

## 2018-02-11 RX ADMIN — CARVEDILOL 12.5 MG: 12.5 TABLET, FILM COATED ORAL at 08:29

## 2018-02-11 RX ADMIN — IPRATROPIUM BROMIDE AND ALBUTEROL SULFATE 1 AMPULE: .5; 3 SOLUTION RESPIRATORY (INHALATION) at 15:35

## 2018-02-11 RX ADMIN — HYDROCODONE BITARTRATE AND ACETAMINOPHEN 1 TABLET: 5; 325 TABLET ORAL at 21:50

## 2018-02-11 RX ADMIN — BUMETANIDE 1 MG: 0.25 INJECTION INTRAMUSCULAR; INTRAVENOUS at 08:29

## 2018-02-11 RX ADMIN — Medication 30 UNITS: at 21:47

## 2018-02-11 RX ADMIN — GABAPENTIN 100 MG: 100 CAPSULE ORAL at 21:46

## 2018-02-11 RX ADMIN — CEFTRIAXONE SODIUM 1 G: 10 INJECTION, POWDER, FOR SOLUTION INTRAVENOUS at 22:55

## 2018-02-11 RX ADMIN — ATORVASTATIN CALCIUM 40 MG: 40 TABLET, FILM COATED ORAL at 21:46

## 2018-02-11 RX ADMIN — HYDRALAZINE HYDROCHLORIDE 50 MG: 50 TABLET, FILM COATED ORAL at 12:58

## 2018-02-11 RX ADMIN — INSULIN LISPRO 2 UNITS: 100 INJECTION, SOLUTION INTRAVENOUS; SUBCUTANEOUS at 16:59

## 2018-02-11 RX ADMIN — FERROUS SULFATE TAB EC 325 MG (65 MG FE EQUIVALENT) 325 MG: 325 (65 FE) TABLET DELAYED RESPONSE at 08:29

## 2018-02-11 RX ADMIN — INSULIN LISPRO 2 UNITS: 100 INJECTION, SOLUTION INTRAVENOUS; SUBCUTANEOUS at 21:47

## 2018-02-11 RX ADMIN — POTASSIUM CHLORIDE 10 MEQ: 750 CAPSULE, EXTENDED RELEASE ORAL at 21:46

## 2018-02-11 ASSESSMENT — PAIN DESCRIPTION - LOCATION: LOCATION: LEG

## 2018-02-11 ASSESSMENT — PAIN SCALES - GENERAL
PAINLEVEL_OUTOF10: 6
PAINLEVEL_OUTOF10: 4

## 2018-02-11 ASSESSMENT — PAIN DESCRIPTION - DESCRIPTORS: DESCRIPTORS: STABBING

## 2018-02-11 ASSESSMENT — PAIN DESCRIPTION - PAIN TYPE: TYPE: ACUTE PAIN

## 2018-02-11 ASSESSMENT — PAIN DESCRIPTION - ORIENTATION: ORIENTATION: LOWER

## 2018-02-11 NOTE — PROGRESS NOTES
Meds:   bumetanide  1 mg Intravenous BID    aspirin  81 mg Oral Daily    atorvastatin  40 mg Oral Daily    carvedilol  12.5 mg Oral BID    DULoxetine  30 mg Oral Daily    ferrous sulfate  325 mg Oral Daily with breakfast    gabapentin  100 mg Oral TID    hydrALAZINE  50 mg Oral 3 times per day    insulin glargine  30 Units Subcutaneous Nightly    isosorbide mononitrate  30 mg Oral Daily    insulin lispro  0-12 Units Subcutaneous TID WC    insulin lispro  0-6 Units Subcutaneous Nightly    potassium chloride  10 mEq Oral BID    ipratropium-albuterol  1 ampule Inhalation Q6H    cefTRIAXone (ROCEPHIN) IV  1 g Intravenous Q24H     Continuous Infusions:   dextrose           Assessment/ Plan :   1. Heart failure  -Secondary to acute/chronic LV diastolic dysfunction, valvular disease, and dietary noncomplianace. -AC diet, IO, Wt, BMP.      2. Coronary artery disease  -Non-obstructive disease. Stable. No angina  -On ASA, B. Blocker, Nitrate, and Statin     3. Hypertension  -controlled     4. Hyperlipidemia.  -On Statin    Will change from IV Bumex to po. FU BMP. Telemetry monitoring. Will follow. Thank you very much for allowing us to participate in the care of this patient. Please call us with any questions.     Alysia Chew

## 2018-02-12 LAB
ANION GAP SERPL CALCULATED.3IONS-SCNC: 10 MMOL/L (ref 9–17)
BUN BLDV-MCNC: 47 MG/DL (ref 8–23)
BUN/CREAT BLD: 21 (ref 9–20)
CALCIUM SERPL-MCNC: 9.8 MG/DL (ref 8.6–10.4)
CHLORIDE BLD-SCNC: 100 MMOL/L (ref 98–107)
CO2: 30 MMOL/L (ref 20–31)
CREAT SERPL-MCNC: 2.21 MG/DL (ref 0.5–0.9)
GFR AFRICAN AMERICAN: 26 ML/MIN
GFR NON-AFRICAN AMERICAN: 22 ML/MIN
GFR SERPL CREATININE-BSD FRML MDRD: ABNORMAL ML/MIN/{1.73_M2}
GFR SERPL CREATININE-BSD FRML MDRD: ABNORMAL ML/MIN/{1.73_M2}
GLUCOSE BLD-MCNC: 121 MG/DL (ref 65–105)
GLUCOSE BLD-MCNC: 151 MG/DL (ref 65–105)
GLUCOSE BLD-MCNC: 158 MG/DL (ref 70–99)
GLUCOSE BLD-MCNC: 162 MG/DL (ref 65–105)
GLUCOSE BLD-MCNC: 183 MG/DL (ref 65–105)
MAGNESIUM: 1.8 MG/DL (ref 1.6–2.6)
POTASSIUM SERPL-SCNC: 4 MMOL/L (ref 3.7–5.3)
SODIUM BLD-SCNC: 140 MMOL/L (ref 135–144)

## 2018-02-12 PROCEDURE — 2060000000 HC ICU INTERMEDIATE R&B

## 2018-02-12 PROCEDURE — 36415 COLL VENOUS BLD VENIPUNCTURE: CPT

## 2018-02-12 PROCEDURE — 80048 BASIC METABOLIC PNL TOTAL CA: CPT

## 2018-02-12 PROCEDURE — 6370000000 HC RX 637 (ALT 250 FOR IP): Performed by: INTERNAL MEDICINE

## 2018-02-12 PROCEDURE — 82947 ASSAY GLUCOSE BLOOD QUANT: CPT

## 2018-02-12 PROCEDURE — 94760 N-INVAS EAR/PLS OXIMETRY 1: CPT

## 2018-02-12 PROCEDURE — 2500000003 HC RX 250 WO HCPCS: Performed by: INTERNAL MEDICINE

## 2018-02-12 PROCEDURE — 99232 SBSQ HOSP IP/OBS MODERATE 35: CPT | Performed by: INTERNAL MEDICINE

## 2018-02-12 PROCEDURE — 94640 AIRWAY INHALATION TREATMENT: CPT

## 2018-02-12 PROCEDURE — 83735 ASSAY OF MAGNESIUM: CPT

## 2018-02-12 PROCEDURE — 6360000002 HC RX W HCPCS: Performed by: INTERNAL MEDICINE

## 2018-02-12 RX ORDER — HYDRALAZINE HYDROCHLORIDE 50 MG/1
50 TABLET, FILM COATED ORAL EVERY 8 HOURS SCHEDULED
Qty: 90 TABLET | Refills: 3 | Status: SHIPPED | OUTPATIENT
Start: 2018-02-12 | End: 2018-10-25

## 2018-02-12 RX ORDER — CIPROFLOXACIN 500 MG/1
500 TABLET, FILM COATED ORAL 2 TIMES DAILY
Qty: 20 TABLET | Refills: 0 | Status: SHIPPED | OUTPATIENT
Start: 2018-02-12 | End: 2018-02-22

## 2018-02-12 RX ORDER — BUMETANIDE 2 MG/1
2 TABLET ORAL DAILY
Qty: 30 TABLET | Refills: 2 | Status: ON HOLD | OUTPATIENT
Start: 2018-02-12 | End: 2018-12-20 | Stop reason: HOSPADM

## 2018-02-12 RX ADMIN — BUMETANIDE 1 MG: 0.25 INJECTION INTRAMUSCULAR; INTRAVENOUS at 19:50

## 2018-02-12 RX ADMIN — GABAPENTIN 100 MG: 100 CAPSULE ORAL at 19:49

## 2018-02-12 RX ADMIN — HYDRALAZINE HYDROCHLORIDE 50 MG: 50 TABLET, FILM COATED ORAL at 22:06

## 2018-02-12 RX ADMIN — CARVEDILOL 12.5 MG: 12.5 TABLET, FILM COATED ORAL at 07:51

## 2018-02-12 RX ADMIN — ATORVASTATIN CALCIUM 40 MG: 40 TABLET, FILM COATED ORAL at 19:50

## 2018-02-12 RX ADMIN — FERROUS SULFATE TAB EC 325 MG (65 MG FE EQUIVALENT) 325 MG: 325 (65 FE) TABLET DELAYED RESPONSE at 07:51

## 2018-02-12 RX ADMIN — INSULIN LISPRO 1 UNITS: 100 INJECTION, SOLUTION INTRAVENOUS; SUBCUTANEOUS at 21:55

## 2018-02-12 RX ADMIN — GABAPENTIN 100 MG: 100 CAPSULE ORAL at 13:27

## 2018-02-12 RX ADMIN — INSULIN LISPRO 2 UNITS: 100 INJECTION, SOLUTION INTRAVENOUS; SUBCUTANEOUS at 10:44

## 2018-02-12 RX ADMIN — Medication 30 UNITS: at 21:55

## 2018-02-12 RX ADMIN — POTASSIUM CHLORIDE 10 MEQ: 750 CAPSULE, EXTENDED RELEASE ORAL at 19:49

## 2018-02-12 RX ADMIN — ASPIRIN 81 MG: 81 TABLET, COATED ORAL at 07:51

## 2018-02-12 RX ADMIN — GABAPENTIN 100 MG: 100 CAPSULE ORAL at 07:50

## 2018-02-12 RX ADMIN — HYDRALAZINE HYDROCHLORIDE 50 MG: 50 TABLET, FILM COATED ORAL at 13:27

## 2018-02-12 RX ADMIN — BUMETANIDE 1 MG: 0.25 INJECTION INTRAMUSCULAR; INTRAVENOUS at 07:51

## 2018-02-12 RX ADMIN — HYDRALAZINE HYDROCHLORIDE 50 MG: 50 TABLET, FILM COATED ORAL at 06:24

## 2018-02-12 RX ADMIN — IPRATROPIUM BROMIDE AND ALBUTEROL SULFATE 1 AMPULE: .5; 3 SOLUTION RESPIRATORY (INHALATION) at 09:14

## 2018-02-12 RX ADMIN — IPRATROPIUM BROMIDE AND ALBUTEROL SULFATE 1 AMPULE: .5; 3 SOLUTION RESPIRATORY (INHALATION) at 02:56

## 2018-02-12 RX ADMIN — DULOXETINE HYDROCHLORIDE 30 MG: 30 CAPSULE, DELAYED RELEASE ORAL at 07:51

## 2018-02-12 RX ADMIN — CARVEDILOL 12.5 MG: 12.5 TABLET, FILM COATED ORAL at 19:49

## 2018-02-12 RX ADMIN — ISOSORBIDE MONONITRATE 30 MG: 30 TABLET ORAL at 07:50

## 2018-02-12 RX ADMIN — IPRATROPIUM BROMIDE AND ALBUTEROL SULFATE 1 AMPULE: .5; 3 SOLUTION RESPIRATORY (INHALATION) at 14:27

## 2018-02-12 RX ADMIN — HYDROCODONE BITARTRATE AND ACETAMINOPHEN 1 TABLET: 5; 325 TABLET ORAL at 19:49

## 2018-02-12 RX ADMIN — IPRATROPIUM BROMIDE AND ALBUTEROL SULFATE 1 AMPULE: .5; 3 SOLUTION RESPIRATORY (INHALATION) at 19:40

## 2018-02-12 RX ADMIN — POTASSIUM CHLORIDE 10 MEQ: 750 CAPSULE, EXTENDED RELEASE ORAL at 07:50

## 2018-02-12 RX ADMIN — INSULIN LISPRO 2 UNITS: 100 INJECTION, SOLUTION INTRAVENOUS; SUBCUTANEOUS at 13:27

## 2018-02-12 RX ADMIN — CEFTRIAXONE SODIUM 1 G: 10 INJECTION, POWDER, FOR SOLUTION INTRAVENOUS at 22:06

## 2018-02-12 ASSESSMENT — PAIN SCALES - GENERAL
PAINLEVEL_OUTOF10: 0
PAINLEVEL_OUTOF10: 6
PAINLEVEL_OUTOF10: 4

## 2018-02-12 ASSESSMENT — PAIN DESCRIPTION - LOCATION: LOCATION: LEG

## 2018-02-12 ASSESSMENT — PAIN DESCRIPTION - PAIN TYPE: TYPE: ACUTE PAIN

## 2018-02-12 ASSESSMENT — PAIN DESCRIPTION - ORIENTATION: ORIENTATION: LOWER

## 2018-02-12 NOTE — PLAN OF CARE
Problem: Infection:  Goal: Will remain free from infection  Will remain free from infection   Outcome: Ongoing      Problem: Safety:  Goal: Free from accidental physical injury  Free from accidental physical injury   Outcome: Ongoing    Goal: Free from intentional harm  Free from intentional harm   Outcome: Ongoing      Problem: Daily Care:  Goal: Daily care needs are met  Daily care needs are met   Outcome: Ongoing      Problem: Pain:  Goal: Patient's pain/discomfort is manageable  Patient's pain/discomfort is manageable   Outcome: Ongoing      Problem: Skin Integrity:  Goal: Skin integrity will stabilize  Skin integrity will stabilize   Outcome: Ongoing      Problem: Discharge Planning:  Goal: Patients continuum of care needs are met  Patients continuum of care needs are met   Outcome: Ongoing      Problem: Falls - Risk of  Goal: Absence of falls  Outcome: Ongoing      Problem: OXYGENATION/RESPIRATORY FUNCTION  Goal: Patient will maintain patent airway  Outcome: Ongoing    Goal: Patient will achieve/maintain normal respiratory rate/effort  Respiratory rate and effort will be within normal limits for the patient   Outcome: Ongoing      Problem: HEMODYNAMIC STATUS  Goal: Patient has stable vital signs and fluid balance  Outcome: Ongoing      Problem: FLUID AND ELECTROLYTE IMBALANCE  Goal: Fluid and electrolyte balance are achieved/maintained  Outcome: Ongoing      Problem: ACTIVITY INTOLERANCE/IMPAIRED MOBILITY  Goal: Mobility/activity is maintained at optimum level for patient  Outcome: Ongoing

## 2018-02-12 NOTE — PROGRESS NOTES
NEPHROLOGY PROGRESS NOTE     Patient :  Rachele Bronson; 67 y.o. MRN# [id number]  Location:  1010/1010-02  Attending:  Evy Mcnair MD  Admit Date:  2/8/2018   Hospital Day: 3      Reason for Consult:  Fluid OVERLOAD,Worsening peripheral edema, CKD      Chief Complaint: worsening leg edema and exertional sob      History of Present Illness: This is a 67 y.o. female with a significant past medical history of COPD,s/p CVA January 2016 (left arm weakness),type 2 DM,systemic hypertension and CKD stage 3b (baseline 1.9-2.1 mg/dl), She has a history of aortic valve replacement, chronic congestive heart failure, obstructive sleep apnea (uses a CPAP at home). Patient was seen by Dr Gopi Dodge in the office 2/8/18 and was subsequently admitted for worsening bilateral pedal edema (approx two months, worse in the last 2 weeks, worsening exertional dyspnea and non-healing wound to her lower legs which had start to \"seep\" within the last 7-10 days. She denied any chills/fever. Patient weight in Aug 2017 was 236 lbs, now her weight 240-250 LBS. Subjective  Patient is feeling better breathing has improved. Edema improved  And feels her edema is also improving.   She is responding well to Bumex making good amount of urine  Serum creatinine is near her baseline, recent serum creatinine 2.0 mg/dL    Past Medical History:        Diagnosis Date    Arthritis     right knee    Cerebral artery occlusion with cerebral infarction ([address] 02/2017    weakness in left hand    CHF (congestive heart failure) (HCC)     CKD (chronic kidney disease) stage 3, GFR 30-59 ml/min     COPD (chronic obstructive pulmonary disease) ([address]     Depression (emotion) 2/13/2016    Diabetes mellitus ([address]     Diabetic neuropathy ([address] 10/23/2014    DM type 2 (diabetes mellitus, type 2) ([address]     Edema     Hx MRSA infection resolved 8/2017    2 negative nasal screens - 8/2017 (hx in blood & CSF - 2016)    Hyperlipidemia     Hypertension     Used      Comment: Quit smoking 6 years ago/ Started smoking at 25    Alcohol use No      Comment: 2 beers once a month    Drug use: No    Sexual activity: Yes     Partners: Male     Other Topics Concern    Not on file     Social History Narrative    No narrative on file           Objective:  CURRENT TEMPERATURE:  Temp: 97.9 °F (36.6 °C)  MAXIMUM TEMPERATURE OVER 24HRS:  Temp (24hrs), Av.9 °F (36.6 °C), Min:97.7 °F (36.5 °C), Max:98.1 °F (36.7 °C)    CURRENT RESPIRATORY RATE:  Resp: 16  CURRENT PULSE:  Pulse: 80  CURRENT BLOOD PRESSURE:  BP: 131/66  24HR BLOOD PRESSURE RANGE:  Systolic (72OXQ), OFK:777 , Min:120 , LOC:256   ; Diastolic (05LGL), RHR:82, Min:55, Max:82    24HR INTAKE/OUTPUT:      Intake/Output Summary (Last 24 hours) at 18  Last data filed at 18 1701   Gross per 24 hour   Intake             1060 ml   Output             1400 ml   Net             -340 ml     Patient Vitals for the past 96 hrs (Last 3 readings):   Weight   18 0654 249 lb (112.9 kg)   02/10/18 0646 250 lb 11.2 oz (113.7 kg)   18 1656 240 lb (108.9 kg)       Physical Exam:  GENERAL APPEARANCE: Alert and cooperative, and appears to be in no acute distress. HEAD: normocephalic  EYES: PERRL, EOMI. Not pale, anicteric   NOSE:  No nasal discharge. THROAT:  Oral cavity and pharynx normal. Moist  CARDIAC: Normal S1 and S2. No S3, S4 or murmurs. Rhythm is regular. LUNGS: Clear to auscultation and percussion without rales, rhonchi, wheezing or diminished breath sounds. NECK: Neck supple, non-tender without lymphadenopathy, masses or thyromegaly. BACK: Examination of the spine reveals normal gait and posture, no spinal deformity, symmetry of spinal muscles, without tenderness, decreased range of motion or muscular spasm  MUSKULOSKELETAL: Adequately aligned spine. No joint erythema or tenderness. EXTREMITIES: 2-3 + edema. Peripheral pulses intact.    NEURO:non focal      Labs:   CBC:  No results for

## 2018-02-12 NOTE — PROGRESS NOTES
Progress Note    PCP: Didi Hdz MD  2/11/2018  8:59 PM    Admitting Diagnosis:  Acute on chronic diastolic congestive heart failure ([facility]    Problem List:  Active Hospital Problems    Diagnosis Date Noted    CHF (congestive heart failure), NYHA class I, acute on chronic, combined ([facility] [I50.43] 02/08/2018    Acute on chronic diastolic congestive heart failure ([facility] [I50.33] 02/08/2018    Stage 4 chronic kidney disease ([facility] [N18.4] 02/08/2018    Cellulitis of both lower extremities [L03.115, L03.116] 02/08/2018    Uncontrolled type 2 diabetes mellitus ([facility] [E11.65] 02/08/2018    Morbid obesity with BMI of 40.0-44.9, adult ([facility] [E66.01, Z68.41] 02/08/2018    Acute on chronic diastolic heart failure ([facility] [I50.33] 08/18/2017       Chief Complaint: see admitting diagnosis    Subjective: breathing better. No new complaints. Patient states that overall she has improved from baseline. Allergies: Allergies   Allergen Reactions    Ambien [Zolpidem Tartrate]      Loss of consciousness       Current Medication    Physical Examination:  /66   Pulse 80   Temp 97.9 °F (36.6 °C) (Oral)   Resp 16   Ht 5' 5\" (1.651 m)   Wt 249 lb (112.9 kg)   SpO2 100%   BMI 41.44 kg/m²   General appearance -  in no distress. Mental status - alert, oriented to person, place, and time. Neck - supple, no neck vein distention. Chest - clear to auscultation. No rales. Heart - normal rate, regular rhythm, normal S1, S2, no  gallops  Abdomen - soft, nontender, nondistended. Neurological - alert, oriented, normal speech, no focal findings or movement disorder noted. Musculoskeletal - no joint tenderness, deformity or swelling. Extremities - edema improving. Leg wounds dry and has improved from baseline. Labs reviewed. BUN/Creatinine has trended down since Bumex dose was reduced. ID consult note appreciated as well as Renal and Cardiology. Continuing current regimen.     Electronically signed by Samantha Isabel

## 2018-02-12 NOTE — PROGRESS NOTES
[facility]  Progress Note    Bin Berman  MRN: [id number]  Date: 2/12/2018    Reason for F/U :   · Infected ulcer    Impression :   · Bilateral lower extremity lymphedema  · Right leg weeping venous stasis ulcer with Pseudomonas colonization and not actively infected  · Infectious disease wise the patient is okay for discharge. Recommendations:   · Continue diuretic therapy  · Ace wraps to control lymphedema  · Leg elevation when feasible  · Continue wound care with collagen dressing twice a day  · Continue off antimicrobial therapy. Infection Control Recommendations:   Universal precautions    Discharge Planning:   Estimated Length of IV antimicrobials: NONE  Patient will need Midline Catheter Insertion/ PICC line Insertion: No  Patient will need: Home IV , [place],  SNF,  LTAC: Undetermined  Patient will need outpatient wound care: No    Medical Decision making / Summary of Stay:   Bin Berman is a 67y.o.-year-old  female who was initially admitted on 2/8/2018. Patient seen at the request of .     The patient suffers from congestive heart failure and was admitted to the hospital because of an exacerbation of congestive heart failure. She indicates that the associated with this problem she has noticed swelling of both lower extremities. This has been more evident on the right side where she has chronic Venous stasis ulcers which are being managed at the wound care center from the [address] system at [facility].     The patient has a long list of medical problems as detailed in past history.     The patient indicates that she has been giving her cream and dressings to apply to her legs but she is uncertain as to what these medications are.     Currently she has superficial ulcerations on the right leg without any evidence of active infection. Cultures from the wound care center show the presence of 2 strains of Pseudomonas.   This finding is to be expected given the presence of moisture on the wounds which provides an ideal environment for Pseudomonas. Current evaluation:2018    BP (!) 133/52   Pulse 69   Temp 97.9 °F (36.6 °C) (Oral)   Resp 18   Ht 5' 5\" (1.651 m)   Wt 249 lb (112.9 kg)   SpO2 90%   BMI 41.44 kg/m²     Temperature Range: Temp: 97.9 °F (36.6 °C) Temp  Av.7 °F (36.5 °C)  Min: 97.5 °F (36.4 °C)  Max: 97.9 °F (36.6 °C)  The patient is seen and evaluated at bedside she is awake and alert in no acute distress. She does not report any new issues or concerns. She reports that the edema has improved significantly though she still does have some mild edema in the right lower extremity. She does not have any fevers or chills. Physical Examination :     Physical Exam   Constitutional: She is oriented to person, place, and time. She appears well-developed. HENT:   Head: Normocephalic. Eyes: Pupils are equal, round, and reactive to light. Neck: Normal range of motion. Neck supple. Cardiovascular: Regular rhythm and normal heart sounds. Pulmonary/Chest: Effort normal and breath sounds normal.   Abdominal: Soft. Bowel sounds are normal.   Neurological: She is alert and oriented to person, place, and time. Skin: Skin is warm and dry. There is a dressing over the right lower extremities which was not removed         Laboratory data:   I have independently reviewed the following labs:  CBC with Differential: No results for input(s): WBC, HGB, HCT, PLT, SEGSPCT, BANDSPCT, LYMPHOPCT, MONOPCT, EOSPCT in the last 72 hours. BMP:   Recent Labs      18   0338  18   0354   NA  140  140   K  3.8  4.0   CL  99  100   CO2  31  30   BUN  47*  47*   CREATININE  2.06*  2.21*     Hepatic Function Panel: No results for input(s): PROT, LABALBU, BILIDIR, IBILI, BILITOT, ALKPHOS, ALT, AST in the last 72 hours. No results for input(s): VANCOTROUGH in the last 72 hours.    Lab Results   Component Value Date .9 (H) 02/16/2016     No results found for: SEDRATE    Imaging Studies:   No new imaging    Cultures:     Cult,Blood [[id number]] Collected: 02/08/18 1711   Order Status: Completed Specimen: Blood Updated: 02/12/18 0044    Specimen Description . BLOOD 11 ML  RIGHT ARM Performed at [facility] [address] [facility]    Specimen Description [place] [address] [phone]    Special Requests NOT REPORTED    Culture NO GROWTH 4 DAYS    Culture Performed at [address] [phone]    Status Pending   CULTURE BLOOD #2 [[id number]] Collected: 02/08/18 1731   Order Status: Completed Specimen: Blood Updated: 02/12/18 0044    Specimen Description . BLOOD 11 ML  RIGHT HAND Performed at [facility] [address]    Specimen Description [address] [phone]    Special Requests NOT REPORTED    Culture NO GROWTH 4 DAYS    Culture Performed at [address], [address] [place] [address] [phone]    Status Pending     Cult,Wound [[id number]] (Abnormal)  Collected: 02/08/18 1647   Order Status: Completed Specimen: Leg Updated: 02/10/18 1438    Specimen Description . LEG Performed at [address]    Specimen Description  [id number] [phone]    Special Requests NOT REPORTED    Direct Exam NO NEUTROPHILS SEEN    Direct Exam FEW GRAM NEGATIVE RODS (A)    Culture PSEUDOMONAS AERUGINOSA MODERATE GROWTH (A)    Culture Performed at [address], [address] [place] [address] [phone]    Status FINAL 02/10/2018    Organism PSAE   PSEUDOMONAS AERUGINOSA     Antibiotic Interpretation ELIS Status   amikacin  NOT REPORTED Final   ampicillin  NOT REPORTED Final   ampicillin-sulbactam  NOT REPORTED Final   ceFAZolin  NOT REPORTED Final   cefTRIAXone  NOT REPORTED Final   cefepime Sensitive <=1 SUSCEPTIBLE Final   ciprofloxacin Sensitive <=0.25 SUSCEPTIBLE Final   gentamicin Sensitive

## 2018-02-13 VITALS
DIASTOLIC BLOOD PRESSURE: 85 MMHG | SYSTOLIC BLOOD PRESSURE: 170 MMHG | OXYGEN SATURATION: 94 % | HEIGHT: 65 IN | WEIGHT: 251.3 LBS | TEMPERATURE: 98.6 F | RESPIRATION RATE: 16 BRPM | HEART RATE: 72 BPM | BODY MASS INDEX: 41.87 KG/M2

## 2018-02-13 LAB
ANION GAP SERPL CALCULATED.3IONS-SCNC: 12 MMOL/L (ref 9–17)
BUN BLDV-MCNC: 45 MG/DL (ref 8–23)
BUN/CREAT BLD: 22 (ref 9–20)
CALCIUM SERPL-MCNC: 9.3 MG/DL (ref 8.6–10.4)
CHLORIDE BLD-SCNC: 100 MMOL/L (ref 98–107)
CO2: 30 MMOL/L (ref 20–31)
CREAT SERPL-MCNC: 2.07 MG/DL (ref 0.5–0.9)
GFR AFRICAN AMERICAN: 29 ML/MIN
GFR NON-AFRICAN AMERICAN: 24 ML/MIN
GFR SERPL CREATININE-BSD FRML MDRD: ABNORMAL ML/MIN/{1.73_M2}
GFR SERPL CREATININE-BSD FRML MDRD: ABNORMAL ML/MIN/{1.73_M2}
GLUCOSE BLD-MCNC: 105 MG/DL (ref 70–99)
GLUCOSE BLD-MCNC: 109 MG/DL (ref 65–105)
POTASSIUM SERPL-SCNC: 3.9 MMOL/L (ref 3.7–5.3)
SODIUM BLD-SCNC: 142 MMOL/L (ref 135–144)

## 2018-02-13 PROCEDURE — 80048 BASIC METABOLIC PNL TOTAL CA: CPT

## 2018-02-13 PROCEDURE — 82947 ASSAY GLUCOSE BLOOD QUANT: CPT

## 2018-02-13 PROCEDURE — 6370000000 HC RX 637 (ALT 250 FOR IP): Performed by: INTERNAL MEDICINE

## 2018-02-13 PROCEDURE — 36415 COLL VENOUS BLD VENIPUNCTURE: CPT

## 2018-02-13 RX ORDER — BUMETANIDE 1 MG/1
2 TABLET ORAL 2 TIMES DAILY
Status: DISCONTINUED | OUTPATIENT
Start: 2018-02-13 | End: 2018-02-13 | Stop reason: HOSPADM

## 2018-02-13 RX ADMIN — ASPIRIN 81 MG: 81 TABLET, COATED ORAL at 09:26

## 2018-02-13 RX ADMIN — HYDRALAZINE HYDROCHLORIDE 50 MG: 50 TABLET, FILM COATED ORAL at 06:22

## 2018-02-13 RX ADMIN — BUMETANIDE 2 MG: 1 TABLET ORAL at 09:25

## 2018-02-13 RX ADMIN — GABAPENTIN 100 MG: 100 CAPSULE ORAL at 09:26

## 2018-02-13 RX ADMIN — ISOSORBIDE MONONITRATE 30 MG: 30 TABLET ORAL at 09:26

## 2018-02-13 RX ADMIN — DULOXETINE HYDROCHLORIDE 30 MG: 30 CAPSULE, DELAYED RELEASE ORAL at 09:26

## 2018-02-13 RX ADMIN — POTASSIUM CHLORIDE 10 MEQ: 750 CAPSULE, EXTENDED RELEASE ORAL at 09:25

## 2018-02-13 RX ADMIN — HYDROCODONE BITARTRATE AND ACETAMINOPHEN 1 TABLET: 5; 325 TABLET ORAL at 01:56

## 2018-02-13 RX ADMIN — CARVEDILOL 12.5 MG: 12.5 TABLET, FILM COATED ORAL at 09:26

## 2018-02-13 RX ADMIN — FERROUS SULFATE TAB EC 325 MG (65 MG FE EQUIVALENT) 325 MG: 325 (65 FE) TABLET DELAYED RESPONSE at 09:26

## 2018-02-13 ASSESSMENT — PAIN DESCRIPTION - DESCRIPTORS: DESCRIPTORS: STABBING;ACHING;BURNING

## 2018-02-13 ASSESSMENT — PAIN DESCRIPTION - LOCATION: LOCATION: LEG

## 2018-02-13 ASSESSMENT — PAIN DESCRIPTION - ORIENTATION: ORIENTATION: LOWER

## 2018-02-13 ASSESSMENT — PAIN SCALES - GENERAL
PAINLEVEL_OUTOF10: 6
PAINLEVEL_OUTOF10: 0

## 2018-02-13 ASSESSMENT — PAIN DESCRIPTION - PAIN TYPE: TYPE: ACUTE PAIN

## 2018-02-13 NOTE — PROGRESS NOTES
Pt iv dc'd-pt given scripts for  home and verbalized understanding of medications and side effects-pt given follow up appointment info and pt will call all 3 doctors today to set up times-pt given extra supplies for wound care-pt given am meds-pt per wheelchair to lobby-pt picked up by her sister-beth well-zero distress noted upon dc-vitals stable

## 2018-02-13 NOTE — DISCHARGE SUMMARY
Discharge Summary    Patient ID: Bin Berman    MRN: [id number]     Acct:  [de-identified]       Patient's PCP: Essence Brito MD    Admit Date: 2/8/2018     Discharge Date:  2/13/2018  (if she remains stable - RN to call me prior to discharge)    Admitting Physician: Martine Nieves MD    Discharge Physician: Essence Brito MD     Discharge Diagnoses:  Acute on Chronic Diastolic Heart Failure  Chronic Kidney Disease stage 4  Cellulitis of both lower extremities (Pseudomonas)  Morbid Obesity (BMI 40.0-44. 9)  Uncontrolled type 2 diabetes mellitus        Primary Problem  Acute on chronic diastolic congestive heart failure [facility])    Active Hospital Problems    Diagnosis Date Noted    CHF (congestive heart failure), NYHA class I, acute on chronic, combined ([address] [I50.43] 02/08/2018    Acute on chronic diastolic congestive heart failure ([address] [I50.33] 02/08/2018    Stage 4 chronic kidney disease ([address] [N18.4] 02/08/2018    Cellulitis of both lower extremities [L03.115, L03.116] 02/08/2018    Uncontrolled type 2 diabetes mellitus ([address] [E11.65] 02/08/2018    Morbid obesity with BMI of 40.0-44.9, adult ([address] [E66.01, Z68.41] 02/08/2018    Acute on chronic diastolic heart failure ([address] [I50.33] 08/18/2017     Past Medical History:   Diagnosis Date    Arthritis     right knee    Cerebral artery occlusion with cerebral infarction ([address] 02/2017    weakness in left hand    CHF (congestive heart failure) (HCC)     CKD (chronic kidney disease) stage 3, GFR 30-59 ml/min     COPD (chronic obstructive pulmonary disease) ([address]     Depression (emotion) 2/13/2016    Diabetes mellitus ([address]     Diabetic neuropathy ([address] 10/23/2014    DM type 2 (diabetes mellitus, type 2) ([address]     Edema     Hx MRSA infection resolved 8/2017    2 negative nasal screens - 8/2017 (hx in blood & CSF - 2016)    Hyperlipidemia     Hypertension     LAD stenosis 10/28/2014    Pneumonia many years ago    Sleep apnea     no

## 2018-02-14 LAB
CULTURE: NORMAL
Lab: NORMAL
Lab: NORMAL
SPECIMEN DESCRIPTION: NORMAL
STATUS: NORMAL
STATUS: NORMAL

## 2018-02-20 PROBLEM — N18.30 CHRONIC RENAL FAILURE, STAGE 3 (MODERATE) (HCC): Status: RESOLVED | Noted: 2017-08-17 | Resolved: 2018-02-20

## 2018-02-21 ENCOUNTER — HOSPITAL ENCOUNTER (OUTPATIENT)
Age: 73
Setting detail: SPECIMEN
Discharge: HOME | End: 2018-02-21
Payer: MEDICARE

## 2018-02-21 DIAGNOSIS — I12.9 BENIGN HYPERTENSION WITH CKD (CHRONIC KIDNEY DISEASE) STAGE IV (HCC): ICD-10-CM

## 2018-02-21 DIAGNOSIS — N18.4 STAGE 4 CHRONIC KIDNEY DISEASE (HCC): ICD-10-CM

## 2018-02-21 DIAGNOSIS — N18.4 BENIGN HYPERTENSION WITH CKD (CHRONIC KIDNEY DISEASE) STAGE IV (HCC): ICD-10-CM

## 2018-02-21 DIAGNOSIS — N18.4 SECONDARY DIABETES MELLITUS WITH STAGE 4 CHRONIC KIDNEY DISEASE (HCC): ICD-10-CM

## 2018-02-21 DIAGNOSIS — E13.22 SECONDARY DIABETES MELLITUS WITH STAGE 4 CHRONIC KIDNEY DISEASE (HCC): ICD-10-CM

## 2018-02-21 LAB
ANION GAP SERPL CALCULATED.3IONS-SCNC: 14 MMOL/L (ref 9–17)
BUN BLDV-MCNC: 51 MG/DL (ref 8–23)
BUN/CREAT BLD: ABNORMAL (ref 9–20)
CALCIUM SERPL-MCNC: 9.8 MG/DL (ref 8.6–10.4)
CHLORIDE BLD-SCNC: 99 MMOL/L (ref 98–107)
CO2: 30 MMOL/L (ref 20–31)
CREAT SERPL-MCNC: 2.1 MG/DL (ref 0.5–0.9)
GFR AFRICAN AMERICAN: 28 ML/MIN
GFR NON-AFRICAN AMERICAN: 23 ML/MIN
GFR SERPL CREATININE-BSD FRML MDRD: ABNORMAL ML/MIN/{1.73_M2}
GFR SERPL CREATININE-BSD FRML MDRD: ABNORMAL ML/MIN/{1.73_M2}
GLUCOSE BLD-MCNC: 156 MG/DL (ref 70–99)
MAGNESIUM: 2.1 MG/DL (ref 1.6–2.6)
POTASSIUM SERPL-SCNC: 3.8 MMOL/L (ref 3.7–5.3)
PTH INTACT: 222.7 PG/ML (ref 15–65)
SODIUM BLD-SCNC: 143 MMOL/L (ref 135–144)
VITAMIN D 25-HYDROXY: 8.8 NG/ML (ref 30–100)

## 2018-03-21 ENCOUNTER — HOSPITAL ENCOUNTER (OUTPATIENT)
Age: 73
Discharge: HOME | End: 2018-03-21
Payer: MEDICARE

## 2018-03-21 DIAGNOSIS — E55.9 VITAMIN D DEFICIENCY: ICD-10-CM

## 2018-03-21 DIAGNOSIS — R60.0 PEDAL EDEMA: ICD-10-CM

## 2018-03-21 DIAGNOSIS — N18.4 SECONDARY DIABETES MELLITUS WITH STAGE 4 CHRONIC KIDNEY DISEASE (HCC): ICD-10-CM

## 2018-03-21 DIAGNOSIS — E13.22 SECONDARY DIABETES MELLITUS WITH STAGE 4 CHRONIC KIDNEY DISEASE (HCC): ICD-10-CM

## 2018-03-21 DIAGNOSIS — R60.0 BILATERAL EDEMA OF LOWER EXTREMITY: ICD-10-CM

## 2018-03-21 DIAGNOSIS — N18.30 CKD (CHRONIC KIDNEY DISEASE) STAGE 3, GFR 30-59 ML/MIN (HCC): ICD-10-CM

## 2018-03-21 DIAGNOSIS — I12.9 BENIGN HYPERTENSION WITH CKD (CHRONIC KIDNEY DISEASE) STAGE IV (HCC): ICD-10-CM

## 2018-03-21 DIAGNOSIS — N18.4 STAGE 4 CHRONIC KIDNEY DISEASE (HCC): ICD-10-CM

## 2018-03-21 DIAGNOSIS — N18.4 BENIGN HYPERTENSION WITH CKD (CHRONIC KIDNEY DISEASE) STAGE IV (HCC): ICD-10-CM

## 2018-03-21 LAB
ANION GAP SERPL CALCULATED.3IONS-SCNC: 13 MMOL/L (ref 9–17)
BUN BLDV-MCNC: 40 MG/DL (ref 8–23)
BUN/CREAT BLD: 20 (ref 9–20)
CALCIUM SERPL-MCNC: 9.3 MG/DL (ref 8.6–10.4)
CHLORIDE BLD-SCNC: 101 MMOL/L (ref 98–107)
CO2: 28 MMOL/L (ref 20–31)
CREAT SERPL-MCNC: 2.04 MG/DL (ref 0.5–0.9)
GFR AFRICAN AMERICAN: 29 ML/MIN
GFR NON-AFRICAN AMERICAN: 24 ML/MIN
GFR SERPL CREATININE-BSD FRML MDRD: ABNORMAL ML/MIN/{1.73_M2}
GFR SERPL CREATININE-BSD FRML MDRD: ABNORMAL ML/MIN/{1.73_M2}
GLUCOSE BLD-MCNC: 230 MG/DL (ref 70–99)
MAGNESIUM: 2.1 MG/DL (ref 1.6–2.6)
POTASSIUM SERPL-SCNC: 4.2 MMOL/L (ref 3.7–5.3)
PTH INTACT: 163.5 PG/ML (ref 15–65)
SODIUM BLD-SCNC: 142 MMOL/L (ref 135–144)
VITAMIN D 25-HYDROXY: 15.3 NG/ML (ref 30–100)

## 2018-03-21 PROCEDURE — 83970 ASSAY OF PARATHORMONE: CPT

## 2018-03-21 PROCEDURE — 80048 BASIC METABOLIC PNL TOTAL CA: CPT

## 2018-03-21 PROCEDURE — 82306 VITAMIN D 25 HYDROXY: CPT

## 2018-03-21 PROCEDURE — 83735 ASSAY OF MAGNESIUM: CPT

## 2018-03-21 PROCEDURE — 36415 COLL VENOUS BLD VENIPUNCTURE: CPT

## 2018-09-13 ENCOUNTER — HOSPITAL ENCOUNTER (OUTPATIENT)
Age: 73
Discharge: HOME | End: 2018-09-13
Payer: MEDICARE

## 2018-09-13 ENCOUNTER — HOSPITAL ENCOUNTER (OUTPATIENT)
Dept: GENERAL RADIOLOGY | Age: 73
Discharge: HOME | End: 2018-09-15
Payer: MEDICARE

## 2018-09-13 ENCOUNTER — HOSPITAL ENCOUNTER (OUTPATIENT)
Age: 73
Discharge: HOME | End: 2018-09-15
Payer: MEDICARE

## 2018-09-13 DIAGNOSIS — I50.9 CONGESTIVE HEART FAILURE, UNSPECIFIED HF CHRONICITY, UNSPECIFIED HEART FAILURE TYPE (HCC): ICD-10-CM

## 2018-09-13 LAB
ALBUMIN SERPL-MCNC: 4.4 G/DL (ref 3.5–5.2)
ANION GAP SERPL CALCULATED.3IONS-SCNC: 15 MMOL/L (ref 9–17)
BNP INTERPRETATION: ABNORMAL
BUN BLDV-MCNC: 42 MG/DL (ref 8–23)
BUN/CREAT BLD: 21 (ref 9–20)
CALCIUM SERPL-MCNC: 10 MG/DL (ref 8.6–10.4)
CHLORIDE BLD-SCNC: 106 MMOL/L (ref 98–107)
CO2: 24 MMOL/L (ref 20–31)
CREAT SERPL-MCNC: 1.97 MG/DL (ref 0.5–0.9)
GFR AFRICAN AMERICAN: 30 ML/MIN
GFR NON-AFRICAN AMERICAN: 25 ML/MIN
GFR SERPL CREATININE-BSD FRML MDRD: ABNORMAL ML/MIN/{1.73_M2}
GFR SERPL CREATININE-BSD FRML MDRD: ABNORMAL ML/MIN/{1.73_M2}
GLUCOSE BLD-MCNC: 89 MG/DL (ref 70–99)
HCT VFR BLD CALC: 33.5 % (ref 36–46)
HEMOGLOBIN: 11.3 G/DL (ref 12–16)
MCH RBC QN AUTO: 29.8 PG (ref 26–34)
MCHC RBC AUTO-ENTMCNC: 33.7 G/DL (ref 31–37)
MCV RBC AUTO: 88.2 FL (ref 80–100)
NRBC AUTOMATED: ABNORMAL PER 100 WBC
PDW BLD-RTO: 14.1 % (ref 11.5–14.5)
PLATELET # BLD: 155 K/UL (ref 130–400)
PMV BLD AUTO: ABNORMAL FL (ref 6–12)
POTASSIUM SERPL-SCNC: 4.2 MMOL/L (ref 3.7–5.3)
PRO-BNP: 697 PG/ML
RBC # BLD: 3.8 M/UL (ref 4–5.2)
SODIUM BLD-SCNC: 145 MMOL/L (ref 135–144)
WBC # BLD: 6.1 K/UL (ref 3.5–11)

## 2018-09-13 PROCEDURE — 80048 BASIC METABOLIC PNL TOTAL CA: CPT

## 2018-09-13 PROCEDURE — 82040 ASSAY OF SERUM ALBUMIN: CPT

## 2018-09-13 PROCEDURE — 85027 COMPLETE CBC AUTOMATED: CPT

## 2018-09-13 PROCEDURE — 36415 COLL VENOUS BLD VENIPUNCTURE: CPT

## 2018-09-13 PROCEDURE — 83880 ASSAY OF NATRIURETIC PEPTIDE: CPT

## 2018-09-13 PROCEDURE — 71046 X-RAY EXAM CHEST 2 VIEWS: CPT

## 2018-10-25 ENCOUNTER — APPOINTMENT (OUTPATIENT)
Dept: GENERAL RADIOLOGY | Age: 73
DRG: 291 | End: 2018-10-25
Payer: MEDICARE

## 2018-10-25 ENCOUNTER — HOSPITAL ENCOUNTER (INPATIENT)
Age: 73
LOS: 5 days | Discharge: HOME | DRG: 291 | End: 2018-10-30
Attending: EMERGENCY MEDICINE | Admitting: INTERNAL MEDICINE
Payer: MEDICARE

## 2018-10-25 DIAGNOSIS — L29.9 ITCHING: ICD-10-CM

## 2018-10-25 DIAGNOSIS — N17.9 ACUTE KIDNEY INJURY (HCC): ICD-10-CM

## 2018-10-25 DIAGNOSIS — I50.9 ACUTE CONGESTIVE HEART FAILURE, UNSPECIFIED HEART FAILURE TYPE (HCC): Primary | ICD-10-CM

## 2018-10-25 DIAGNOSIS — I50.33 ACUTE ON CHRONIC DIASTOLIC HEART FAILURE (HCC): ICD-10-CM

## 2018-10-25 DIAGNOSIS — M34.9 SCLERODERMA (HCC): ICD-10-CM

## 2018-10-25 PROBLEM — E11.9 TYPE 2 DIABETES MELLITUS (HCC): Status: ACTIVE | Noted: 2018-10-25

## 2018-10-25 PROBLEM — N18.9 ACUTE KIDNEY INJURY SUPERIMPOSED ON CHRONIC KIDNEY DISEASE (HCC): Status: ACTIVE | Noted: 2018-10-25

## 2018-10-25 PROBLEM — E66.01 MORBID OBESITY WITH BMI OF 40.0-44.9, ADULT (HCC): Status: ACTIVE | Noted: 2018-10-25

## 2018-10-25 LAB
ABSOLUTE EOS #: 0.3 K/UL (ref 0–0.4)
ABSOLUTE IMMATURE GRANULOCYTE: ABNORMAL K/UL (ref 0–0.3)
ABSOLUTE LYMPH #: 1.8 K/UL (ref 1–4.8)
ABSOLUTE MONO #: 0.5 K/UL (ref 0.2–0.8)
ALBUMIN SERPL-MCNC: 4.3 G/DL (ref 3.5–5.2)
ANION GAP SERPL CALCULATED.3IONS-SCNC: 13 MMOL/L (ref 9–17)
BASOPHILS # BLD: 0 % (ref 0–2)
BASOPHILS ABSOLUTE: 0 K/UL (ref 0–0.2)
BNP INTERPRETATION: ABNORMAL
BUN BLDV-MCNC: 52 MG/DL (ref 8–23)
BUN/CREAT BLD: 20 (ref 9–20)
CALCIUM IONIZED: 1.25 MMOL/L (ref 1.13–1.33)
CALCIUM SERPL-MCNC: 9.8 MG/DL (ref 8.6–10.4)
CHLORIDE BLD-SCNC: 102 MMOL/L (ref 98–107)
CO2: 29 MMOL/L (ref 20–31)
CREAT SERPL-MCNC: 2.66 MG/DL (ref 0.5–0.9)
DIFFERENTIAL TYPE: ABNORMAL
EKG ATRIAL RATE: 75 BPM
EKG P AXIS: 47 DEGREES
EKG P-R INTERVAL: 190 MS
EKG Q-T INTERVAL: 398 MS
EKG QRS DURATION: 112 MS
EKG QTC CALCULATION (BAZETT): 444 MS
EKG R AXIS: -40 DEGREES
EKG T AXIS: 69 DEGREES
EKG VENTRICULAR RATE: 75 BPM
EOSINOPHILS RELATIVE PERCENT: 5 % (ref 1–4)
GFR AFRICAN AMERICAN: 21 ML/MIN
GFR NON-AFRICAN AMERICAN: 18 ML/MIN
GFR SERPL CREATININE-BSD FRML MDRD: ABNORMAL ML/MIN/{1.73_M2}
GFR SERPL CREATININE-BSD FRML MDRD: ABNORMAL ML/MIN/{1.73_M2}
GLUCOSE BLD-MCNC: 142 MG/DL (ref 65–105)
GLUCOSE BLD-MCNC: 145 MG/DL (ref 65–105)
GLUCOSE BLD-MCNC: 174 MG/DL (ref 65–105)
GLUCOSE BLD-MCNC: 80 MG/DL (ref 70–99)
HCT VFR BLD CALC: 33 % (ref 36–46)
HEMOGLOBIN: 10.9 G/DL (ref 12–16)
IMMATURE GRANULOCYTES: ABNORMAL %
INR BLD: 1.1
LYMPHOCYTES # BLD: 24 % (ref 24–44)
MAGNESIUM: 2.1 MG/DL (ref 1.6–2.6)
MCH RBC QN AUTO: 29.2 PG (ref 26–34)
MCHC RBC AUTO-ENTMCNC: 33.1 G/DL (ref 31–37)
MCV RBC AUTO: 88.3 FL (ref 80–100)
MONOCYTES # BLD: 7 % (ref 1–7)
MYOGLOBIN: 284 NG/ML (ref 25–58)
MYOGLOBIN: 285 NG/ML (ref 25–58)
MYOGLOBIN: 288 NG/ML (ref 25–58)
MYOGLOBIN: 313 NG/ML (ref 25–58)
NRBC AUTOMATED: ABNORMAL PER 100 WBC
PARTIAL THROMBOPLASTIN TIME: 29.6 SEC (ref 23–31)
PDW BLD-RTO: 14.2 % (ref 11.5–14.5)
PLATELET # BLD: 197 K/UL (ref 130–400)
PLATELET ESTIMATE: ABNORMAL
PMV BLD AUTO: 8.1 FL (ref 6–12)
POTASSIUM SERPL-SCNC: 4.1 MMOL/L (ref 3.7–5.3)
PRO-BNP: 865 PG/ML
PROTHROMBIN TIME: 11.3 SEC (ref 9.7–11.6)
RBC # BLD: 3.73 M/UL (ref 4–5.2)
RBC # BLD: ABNORMAL 10*6/UL
SEG NEUTROPHILS: 64 % (ref 36–66)
SEGMENTED NEUTROPHILS ABSOLUTE COUNT: 4.6 K/UL (ref 1.8–7.7)
SODIUM BLD-SCNC: 144 MMOL/L (ref 135–144)
THYROXINE, FREE: 1.17 NG/DL (ref 0.93–1.7)
TROPONIN INTERP: ABNORMAL
TROPONIN T: <0.03 NG/ML
TSH SERPL DL<=0.05 MIU/L-ACNC: 1.75 MIU/L (ref 0.3–5)
WBC # BLD: 7.2 K/UL (ref 3.5–11)
WBC # BLD: ABNORMAL 10*3/UL

## 2018-10-25 PROCEDURE — 84443 ASSAY THYROID STIM HORMONE: CPT

## 2018-10-25 PROCEDURE — 83036 HEMOGLOBIN GLYCOSYLATED A1C: CPT

## 2018-10-25 PROCEDURE — 99285 EMERGENCY DEPT VISIT HI MDM: CPT

## 2018-10-25 PROCEDURE — 36415 COLL VENOUS BLD VENIPUNCTURE: CPT

## 2018-10-25 PROCEDURE — 85610 PROTHROMBIN TIME: CPT

## 2018-10-25 PROCEDURE — 6370000000 HC RX 637 (ALT 250 FOR IP): Performed by: INTERNAL MEDICINE

## 2018-10-25 PROCEDURE — 93005 ELECTROCARDIOGRAM TRACING: CPT

## 2018-10-25 PROCEDURE — 2500000003 HC RX 250 WO HCPCS: Performed by: EMERGENCY MEDICINE

## 2018-10-25 PROCEDURE — 80048 BASIC METABOLIC PNL TOTAL CA: CPT

## 2018-10-25 PROCEDURE — 93880 EXTRACRANIAL BILAT STUDY: CPT

## 2018-10-25 PROCEDURE — 82040 ASSAY OF SERUM ALBUMIN: CPT

## 2018-10-25 PROCEDURE — 96374 THER/PROPH/DIAG INJ IV PUSH: CPT

## 2018-10-25 PROCEDURE — 84484 ASSAY OF TROPONIN QUANT: CPT

## 2018-10-25 PROCEDURE — 85025 COMPLETE CBC W/AUTO DIFF WBC: CPT

## 2018-10-25 PROCEDURE — 84439 ASSAY OF FREE THYROXINE: CPT

## 2018-10-25 PROCEDURE — 71045 X-RAY EXAM CHEST 1 VIEW: CPT

## 2018-10-25 PROCEDURE — 83735 ASSAY OF MAGNESIUM: CPT

## 2018-10-25 PROCEDURE — 83880 ASSAY OF NATRIURETIC PEPTIDE: CPT

## 2018-10-25 PROCEDURE — 83874 ASSAY OF MYOGLOBIN: CPT

## 2018-10-25 PROCEDURE — 82330 ASSAY OF CALCIUM: CPT

## 2018-10-25 PROCEDURE — 2060000000 HC ICU INTERMEDIATE R&B

## 2018-10-25 PROCEDURE — 85730 THROMBOPLASTIN TIME PARTIAL: CPT

## 2018-10-25 PROCEDURE — 82947 ASSAY GLUCOSE BLOOD QUANT: CPT

## 2018-10-25 RX ORDER — BUMETANIDE 0.25 MG/ML
1 INJECTION, SOLUTION INTRAMUSCULAR; INTRAVENOUS 2 TIMES DAILY
Status: DISCONTINUED | OUTPATIENT
Start: 2018-10-25 | End: 2018-10-25

## 2018-10-25 RX ORDER — ISOSORBIDE MONONITRATE 30 MG/1
30 TABLET, EXTENDED RELEASE ORAL DAILY
Status: DISCONTINUED | OUTPATIENT
Start: 2018-10-25 | End: 2018-10-30 | Stop reason: HOSPADM

## 2018-10-25 RX ORDER — ASPIRIN 81 MG/1
81 TABLET ORAL DAILY
Status: DISCONTINUED | OUTPATIENT
Start: 2018-10-25 | End: 2018-10-30 | Stop reason: HOSPADM

## 2018-10-25 RX ORDER — CARVEDILOL 12.5 MG/1
12.5 TABLET ORAL 2 TIMES DAILY
Status: DISCONTINUED | OUTPATIENT
Start: 2018-10-25 | End: 2018-10-25

## 2018-10-25 RX ORDER — ALBUTEROL SULFATE 90 UG/1
1 AEROSOL, METERED RESPIRATORY (INHALATION) EVERY 6 HOURS
Status: DISCONTINUED | OUTPATIENT
Start: 2018-10-26 | End: 2018-10-26 | Stop reason: CLARIF

## 2018-10-25 RX ORDER — DEXTROSE MONOHYDRATE 25 G/50ML
12.5 INJECTION, SOLUTION INTRAVENOUS PRN
Status: DISCONTINUED | OUTPATIENT
Start: 2018-10-25 | End: 2018-10-30 | Stop reason: HOSPADM

## 2018-10-25 RX ORDER — INSULIN GLARGINE 100 [IU]/ML
30 INJECTION, SOLUTION SUBCUTANEOUS NIGHTLY
Status: DISCONTINUED | OUTPATIENT
Start: 2018-10-25 | End: 2018-10-30 | Stop reason: HOSPADM

## 2018-10-25 RX ORDER — CARVEDILOL 25 MG/1
25 TABLET ORAL 2 TIMES DAILY WITH MEALS
Status: DISCONTINUED | OUTPATIENT
Start: 2018-10-25 | End: 2018-10-30 | Stop reason: HOSPADM

## 2018-10-25 RX ORDER — HYDROCODONE BITARTRATE AND ACETAMINOPHEN 5; 325 MG/1; MG/1
1 TABLET ORAL EVERY 6 HOURS PRN
Status: DISCONTINUED | OUTPATIENT
Start: 2018-10-25 | End: 2018-10-30 | Stop reason: HOSPADM

## 2018-10-25 RX ORDER — ATORVASTATIN CALCIUM 40 MG/1
40 TABLET, FILM COATED ORAL NIGHTLY
Status: DISCONTINUED | OUTPATIENT
Start: 2018-10-25 | End: 2018-10-30 | Stop reason: HOSPADM

## 2018-10-25 RX ORDER — CALCITRIOL 0.25 UG/1
0.25 CAPSULE, LIQUID FILLED ORAL DAILY
Status: DISCONTINUED | OUTPATIENT
Start: 2018-10-25 | End: 2018-10-25

## 2018-10-25 RX ORDER — HYDRALAZINE HYDROCHLORIDE 50 MG/1
50 TABLET, FILM COATED ORAL 2 TIMES DAILY
Status: DISCONTINUED | OUTPATIENT
Start: 2018-10-25 | End: 2018-10-25

## 2018-10-25 RX ORDER — GABAPENTIN 100 MG/1
100 CAPSULE ORAL 3 TIMES DAILY
Status: DISCONTINUED | OUTPATIENT
Start: 2018-10-25 | End: 2018-10-30 | Stop reason: HOSPADM

## 2018-10-25 RX ORDER — BUMETANIDE 0.25 MG/ML
1 INJECTION, SOLUTION INTRAMUSCULAR; INTRAVENOUS ONCE
Status: COMPLETED | OUTPATIENT
Start: 2018-10-25 | End: 2018-10-25

## 2018-10-25 RX ORDER — BUMETANIDE 0.25 MG/ML
2 INJECTION, SOLUTION INTRAMUSCULAR; INTRAVENOUS DAILY
Status: DISCONTINUED | OUTPATIENT
Start: 2018-10-25 | End: 2018-10-30 | Stop reason: HOSPADM

## 2018-10-25 RX ORDER — LANOLIN ALCOHOL/MO/W.PET/CERES
325 CREAM (GRAM) TOPICAL
Status: DISCONTINUED | OUTPATIENT
Start: 2018-10-26 | End: 2018-10-30 | Stop reason: HOSPADM

## 2018-10-25 RX ORDER — DEXTROSE MONOHYDRATE 50 MG/ML
100 INJECTION, SOLUTION INTRAVENOUS PRN
Status: DISCONTINUED | OUTPATIENT
Start: 2018-10-25 | End: 2018-10-30 | Stop reason: HOSPADM

## 2018-10-25 RX ORDER — HYDRALAZINE HYDROCHLORIDE 100 MG/1
100 TABLET, FILM COATED ORAL 2 TIMES DAILY
Status: ON HOLD | COMMUNITY
End: 2018-10-30 | Stop reason: HOSPADM

## 2018-10-25 RX ORDER — NICOTINE POLACRILEX 4 MG
15 LOZENGE BUCCAL PRN
Status: DISCONTINUED | OUTPATIENT
Start: 2018-10-25 | End: 2018-10-30 | Stop reason: HOSPADM

## 2018-10-25 RX ORDER — HYDROXYZINE HYDROCHLORIDE 10 MG/1
10 TABLET, FILM COATED ORAL EVERY 6 HOURS PRN
Status: DISCONTINUED | OUTPATIENT
Start: 2018-10-25 | End: 2018-10-30 | Stop reason: HOSPADM

## 2018-10-25 RX ADMIN — ISOSORBIDE MONONITRATE 30 MG: 30 TABLET ORAL at 13:13

## 2018-10-25 RX ADMIN — GABAPENTIN 100 MG: 100 CAPSULE ORAL at 13:13

## 2018-10-25 RX ADMIN — ATORVASTATIN CALCIUM 40 MG: 40 TABLET, FILM COATED ORAL at 21:22

## 2018-10-25 RX ADMIN — INSULIN LISPRO 1 UNITS: 100 INJECTION, SOLUTION INTRAVENOUS; SUBCUTANEOUS at 21:22

## 2018-10-25 RX ADMIN — BUMETANIDE 1 MG: 0.25 INJECTION INTRAMUSCULAR; INTRAVENOUS at 08:47

## 2018-10-25 RX ADMIN — INSULIN GLARGINE 30 UNITS: 100 INJECTION, SOLUTION SUBCUTANEOUS at 21:22

## 2018-10-25 RX ADMIN — HYDROXYZINE HYDROCHLORIDE 10 MG: 10 TABLET ORAL at 15:47

## 2018-10-25 RX ADMIN — INSULIN LISPRO 2 UNITS: 100 INJECTION, SOLUTION INTRAVENOUS; SUBCUTANEOUS at 17:43

## 2018-10-25 RX ADMIN — HYDRALAZINE HYDROCHLORIDE 50 MG: 50 TABLET, FILM COATED ORAL at 13:12

## 2018-10-25 RX ADMIN — HYDROXYZINE HYDROCHLORIDE 10 MG: 10 TABLET ORAL at 21:30

## 2018-10-25 RX ADMIN — CARVEDILOL 12.5 MG: 12.5 TABLET, FILM COATED ORAL at 13:13

## 2018-10-25 RX ADMIN — HYDROCODONE BITARTRATE AND ACETAMINOPHEN 1 TABLET: 5; 325 TABLET ORAL at 13:13

## 2018-10-25 RX ADMIN — CARVEDILOL 25 MG: 25 TABLET, FILM COATED ORAL at 21:30

## 2018-10-25 RX ADMIN — ASPIRIN 81 MG: 81 TABLET, COATED ORAL at 13:13

## 2018-10-25 RX ADMIN — GABAPENTIN 100 MG: 100 CAPSULE ORAL at 21:22

## 2018-10-25 ASSESSMENT — ENCOUNTER SYMPTOMS
CONSTIPATION: 0
SHORTNESS OF BREATH: 1
COUGH: 0
EYE REDNESS: 0
VOMITING: 0
DIARRHEA: 0
FACIAL SWELLING: 0
ABDOMINAL PAIN: 0
COLOR CHANGE: 0
EYE DISCHARGE: 0

## 2018-10-25 ASSESSMENT — PAIN SCALES - GENERAL
PAINLEVEL_OUTOF10: 7
PAINLEVEL_OUTOF10: 6
PAINLEVEL_OUTOF10: 1

## 2018-10-25 ASSESSMENT — PAIN DESCRIPTION - PAIN TYPE: TYPE: SUPERFICIAL SOMATIC PAIN

## 2018-10-25 ASSESSMENT — PAIN DESCRIPTION - LOCATION: LOCATION: GENERALIZED

## 2018-10-25 NOTE — PLAN OF CARE
Received [employer] message regarding weeping blisters on lower legs. Will utilize wound care protocol order set regarding partial thickness wet wounds and utilize calcium alginate under dry dressings to absorb the weeping. Review of Care Everywhere shows last visit at [facility] in March 2018. Unable to view any other outside records. Writer will see patient tomorrow morning to discuss her plan of care.

## 2018-10-25 NOTE — CONSULTS
Reason for Consult: CHF    HPI: Ms. Eron Arcos is a 66 yo patient known to our practice for non-obstructive CAD, AVR, CHF, HTN, and HC who has had increasing leg edema as well as exertional dyspnea for the past week and a half. Her legs have had weeping edema. She states she has been compliant with her medications and diet. She had an episode of chest pain a couple of weeks ago. She denies orthopnea, PND, dizziness, or palpitations. She has also had a rash all over. Cardiology is asked to evaluate. PMH:   Past Medical History:   Diagnosis Date    Arthritis     right knee    Cerebral artery occlusion with cerebral infarction ([address] 02/2017    weakness in left hand    CHF (congestive heart failure) (HCC)     CKD (chronic kidney disease) stage 3, GFR 30-59 ml/min (HCC)     COPD (chronic obstructive pulmonary disease) ([address]     Depression (emotion) 2/13/2016    Diabetes mellitus ([address]     Diabetic neuropathy [facility] 10/23/2014    DM type 2 (diabetes mellitus, type 2) ([facility]     Edema     Hx MRSA infection resolved 8/2017    2 negative nasal screens - 8/2017 (hx in blood & CSF - 2016)    Hyperlipidemia     Hypertension     LAD stenosis 10/28/2014    Pneumonia many years ago    Sleep apnea     no machine    SOB (shortness of breath)     Type 2 diabetes mellitus with proteinuria or albuminuria 10/23/2014     Coronary artery disease, she has mild, non-obstructive disease documented on a cardiac catheterization done 6/25/15.  The LMCA was normal, LAD had mild liminal irregularities at the mid portion 30-40%, Circ was normal, and RCA was normal     History of severe AI, status post AVR utilizing a 23 mm [facility] Ease pericardial valve with transaortic resection of subaortic membrane done 7/6/15    Normal LV function, mild to moderate LVH, bioprosthetic aortic valve with mild AS, mild AI, and calcified aortic annulus, mild MR, and trivial TR docuemtned on an echocardiogram performed 8/18/17        PSH:

## 2018-10-25 NOTE — ED PROVIDER NOTES
and time. Skin: Skin is warm and dry. Rash noted. She is not diaphoretic. No erythema. She has a dry rash on many areas of her body, particularly her arms and upper chest.  It is discolored slightly with increased skin pigmentation. Psychiatric: She has a normal mood and affect. Her behavior is normal.   Vitals reviewed. DIAGNOSTIC RESULTS     EKG: All EKG's are interpreted by the Emergency Department Physician who either signs or Co-signs this chart in the absence of a cardiologist.    EKG on my interpretation shows no acute findings    RADIOLOGY:   Non-plain film images such as CT, Ultrasound and MRI are read by the radiologist. Plain radiographic images are visualized and preliminarily interpreted by the emergency physician with the below findings:    Interpretation per the Radiologist below, if available at the time of this note:    Xr Chest Portable    Result Date: 10/25/2018  EXAMINATION: [address] 10/25/2018 7:28 am COMPARISON: 09/13/2018 HISTORY: ORDERING SYSTEM PROVIDED HISTORY: SOB Relevant Medical/Surgical History: sob, chest pain and fluid build up FINDINGS: Stable borderline cardiomegaly. Low lung volumes. Mild central vascular congestion. No discrete consolidation, sizable effusion or discernible pneumothorax. Multilevel degenerative change of the thoracic spine. Sternotomy wires are redemonstrated. Borderline cardiomegaly with likely central vascular congestion though may be accentuated by low lung volumes. No focal airspace consolidation.      LABS:  Labs Reviewed   BASIC METABOLIC PANEL - Abnormal; Notable for the following:        Result Value    BUN 52 (*)     CREATININE 2.66 (*)     GFR Non- 18 (*)     GFR  21 (*)     All other components within normal limits   BRAIN NATRIURETIC PEPTIDE - Abnormal; Notable for the following:     Pro- (*)     All other components within normal limits   TROP/MYOGLOBIN - Abnormal;

## 2018-10-25 NOTE — ED NOTES
Phone report give to [facility] room 1003. Patient being prepatred for transfer to [address] [place] [address] S unit .      Xochilt Oneal RN  10/25/18 3273

## 2018-10-25 NOTE — FLOWSHEET NOTE
Writer visits per rounding. Patient resting in bed. There are no visitors present. Patient engages in conversation with writer, stating that she recalls writer \"from the other times I have been here. \" She expresses no specific needs at present. Writer offers supportive conversation and listening presence, and patient expresses thanks. Spiritual Care will follow as needed.      10/25/18 8253   Encounter Summary   Services provided to: Patient   Referral/Consult From: Rounding   Continue Visiting (10/25/18)   Complexity of Encounter Low   Length of Encounter 15 minutes   Spiritual Assessment Completed Yes   Routine   Type Initial   Assessment Approachable   Intervention Active listening   Outcome Engaged in conversation;Expressed gratitude

## 2018-10-25 NOTE — H&P
Department of Internal Medicine  Attending History and Physical        CHIEF COMPLAINT:  Shortness of breath, edema    History Obtained From:  patient    HISTORY OF PRESENT ILLNESS:    67year old female admitted from ER with complaints of progressive shortness of breath particularly on exertion and worsening bilateral pedal edema. Onset of current problem started about a week ago. She has a history of chronic CHF, CKD, poorly controlled diabetes (with neuropathy) and a prior aortic valve replacement. She denies any chest pains/palpitations. Additionally, she noted onset of a skin rash about two weeks ago which has become generalized and itchy. She states that she changed her detergents/soaps but symptoms persisted. No exposure to someone with a similar problem and she denies any associated chills, fever, joint pains, sore throat. Co-morbidities are outlined under past history.     Past Medical/Surgical History:  Past Medical History:   Diagnosis Date    Arthritis     right knee    Cerebral artery occlusion with cerebral infarction ([facility] 02/2017    weakness in left hand    [facility]cong[facility] failure) [facility]     CKD (chronic kidney disease) stage 3, GFR 30-59 ml/min ([facility]     COPD (chronic obstructive pulmonary disease) ([address]     Depression (emotion) 2/13/2016    Diabetes mellitus ([address]     Diabetic neuropathy ([address] 10/23/2014    DM type 2 (diabetes mellitus, type 2) ([address]     Edema     Hx MRSA infection resolved 8/2017    2 negative nasal screens - 8/2017 (hx in blood & CSF - 2016)    Hyperlipidemia     Hypertension     LAD stenosis 10/28/2014    Pneumonia many years ago    Sleep apnea     no machine    SOB (shortness of breath)     Type 2 diabetes mellitus with proteinuria or albuminuria 10/23/2014         Past Surgical History:   Procedure Laterality Date    AORTIC VALVE REPLACEMENT  july 2015    CARDIAC CATHETERIZATION  2014    TONSILLECTOMY         Current Facility-Administered Medications

## 2018-10-26 ENCOUNTER — APPOINTMENT (OUTPATIENT)
Dept: CT IMAGING | Age: 73
DRG: 291 | End: 2018-10-26
Payer: MEDICARE

## 2018-10-26 LAB
-: NORMAL
ANION GAP SERPL CALCULATED.3IONS-SCNC: 15 MMOL/L (ref 9–17)
BUN BLDV-MCNC: 55 MG/DL (ref 8–23)
BUN/CREAT BLD: 22 (ref 9–20)
CALCIUM SERPL-MCNC: 9.4 MG/DL (ref 8.6–10.4)
CHLORIDE BLD-SCNC: 102 MMOL/L (ref 98–107)
CO2: 27 MMOL/L (ref 20–31)
CREAT SERPL-MCNC: 2.49 MG/DL (ref 0.5–0.9)
ESTIMATED AVERAGE GLUCOSE: 137 MG/DL
GFR AFRICAN AMERICAN: 23 ML/MIN
GFR NON-AFRICAN AMERICAN: 19 ML/MIN
GFR SERPL CREATININE-BSD FRML MDRD: ABNORMAL ML/MIN/{1.73_M2}
GFR SERPL CREATININE-BSD FRML MDRD: ABNORMAL ML/MIN/{1.73_M2}
GLUCOSE BLD-MCNC: 109 MG/DL (ref 65–105)
GLUCOSE BLD-MCNC: 114 MG/DL (ref 70–99)
GLUCOSE BLD-MCNC: 135 MG/DL (ref 65–105)
GLUCOSE BLD-MCNC: 159 MG/DL (ref 65–105)
GLUCOSE BLD-MCNC: 188 MG/DL (ref 65–105)
HBA1C MFR BLD: 6.4 % (ref 4–6)
IRON SATURATION: 20 % (ref 20–55)
IRON: 42 UG/DL (ref 37–145)
LV EF: 69 %
LVEF MODALITY: NORMAL
POTASSIUM SERPL-SCNC: 4 MMOL/L (ref 3.7–5.3)
REASON FOR REJECTION: NORMAL
SEDIMENTATION RATE, ERYTHROCYTE: 42 MM (ref 0–20)
SODIUM BLD-SCNC: 144 MMOL/L (ref 135–144)
TOTAL IRON BINDING CAPACITY: 207 UG/DL (ref 250–450)
UNSATURATED IRON BINDING CAPACITY: 165 UG/DL (ref 112–347)
ZZ NTE CLEAN UP: ORDERED TEST: NORMAL
ZZ NTE WITH NAME CLEAN UP: SPECIMEN SOURCE: NORMAL

## 2018-10-26 PROCEDURE — 94664 DEMO&/EVAL PT USE INHALER: CPT

## 2018-10-26 PROCEDURE — 83550 IRON BINDING TEST: CPT

## 2018-10-26 PROCEDURE — 82947 ASSAY GLUCOSE BLOOD QUANT: CPT

## 2018-10-26 PROCEDURE — 2060000000 HC ICU INTERMEDIATE R&B

## 2018-10-26 PROCEDURE — 99211 OFF/OP EST MAY X REQ PHY/QHP: CPT

## 2018-10-26 PROCEDURE — 80048 BASIC METABOLIC PNL TOTAL CA: CPT

## 2018-10-26 PROCEDURE — 85651 RBC SED RATE NONAUTOMATED: CPT

## 2018-10-26 PROCEDURE — 94760 N-INVAS EAR/PLS OXIMETRY 1: CPT

## 2018-10-26 PROCEDURE — 94640 AIRWAY INHALATION TREATMENT: CPT

## 2018-10-26 PROCEDURE — 93306 TTE W/DOPPLER COMPLETE: CPT

## 2018-10-26 PROCEDURE — 6370000000 HC RX 637 (ALT 250 FOR IP): Performed by: INTERNAL MEDICINE

## 2018-10-26 PROCEDURE — 71250 CT THORAX DX C-: CPT

## 2018-10-26 PROCEDURE — 2500000003 HC RX 250 WO HCPCS: Performed by: INTERNAL MEDICINE

## 2018-10-26 PROCEDURE — 36415 COLL VENOUS BLD VENIPUNCTURE: CPT

## 2018-10-26 PROCEDURE — 83540 ASSAY OF IRON: CPT

## 2018-10-26 PROCEDURE — 86038 ANTINUCLEAR ANTIBODIES: CPT

## 2018-10-26 RX ORDER — IPRATROPIUM BROMIDE AND ALBUTEROL SULFATE 2.5; .5 MG/3ML; MG/3ML
1 SOLUTION RESPIRATORY (INHALATION) EVERY 6 HOURS
Status: DISCONTINUED | OUTPATIENT
Start: 2018-10-26 | End: 2018-10-30 | Stop reason: HOSPADM

## 2018-10-26 RX ADMIN — BUMETANIDE 2 MG: 0.25 INJECTION INTRAMUSCULAR; INTRAVENOUS at 08:33

## 2018-10-26 RX ADMIN — CARVEDILOL 25 MG: 25 TABLET, FILM COATED ORAL at 08:33

## 2018-10-26 RX ADMIN — HYDROXYZINE HYDROCHLORIDE 10 MG: 10 TABLET ORAL at 19:32

## 2018-10-26 RX ADMIN — FERROUS SULFATE TAB EC 325 MG (65 MG FE EQUIVALENT) 325 MG: 325 (65 FE) TABLET DELAYED RESPONSE at 08:33

## 2018-10-26 RX ADMIN — CARVEDILOL 25 MG: 25 TABLET, FILM COATED ORAL at 18:44

## 2018-10-26 RX ADMIN — GABAPENTIN 100 MG: 100 CAPSULE ORAL at 20:59

## 2018-10-26 RX ADMIN — IPRATROPIUM BROMIDE AND ALBUTEROL SULFATE 1 AMPULE: .5; 3 SOLUTION RESPIRATORY (INHALATION) at 09:42

## 2018-10-26 RX ADMIN — GABAPENTIN 100 MG: 100 CAPSULE ORAL at 15:46

## 2018-10-26 RX ADMIN — ISOSORBIDE MONONITRATE 30 MG: 30 TABLET ORAL at 08:33

## 2018-10-26 RX ADMIN — ATORVASTATIN CALCIUM 40 MG: 40 TABLET, FILM COATED ORAL at 20:59

## 2018-10-26 RX ADMIN — INSULIN LISPRO 2 UNITS: 100 INJECTION, SOLUTION INTRAVENOUS; SUBCUTANEOUS at 12:49

## 2018-10-26 RX ADMIN — INSULIN LISPRO 1 UNITS: 100 INJECTION, SOLUTION INTRAVENOUS; SUBCUTANEOUS at 21:00

## 2018-10-26 RX ADMIN — ASPIRIN 81 MG: 81 TABLET, COATED ORAL at 08:33

## 2018-10-26 RX ADMIN — GABAPENTIN 100 MG: 100 CAPSULE ORAL at 08:33

## 2018-10-26 RX ADMIN — INSULIN GLARGINE 30 UNITS: 100 INJECTION, SOLUTION SUBCUTANEOUS at 20:59

## 2018-10-26 RX ADMIN — IPRATROPIUM BROMIDE AND ALBUTEROL SULFATE 1 AMPULE: .5; 3 SOLUTION RESPIRATORY (INHALATION) at 21:23

## 2018-10-26 RX ADMIN — HYDROCODONE BITARTRATE AND ACETAMINOPHEN 1 TABLET: 5; 325 TABLET ORAL at 19:39

## 2018-10-26 RX ADMIN — IPRATROPIUM BROMIDE AND ALBUTEROL SULFATE 1 AMPULE: .5; 3 SOLUTION RESPIRATORY (INHALATION) at 13:57

## 2018-10-26 RX ADMIN — HYDROXYZINE HYDROCHLORIDE 10 MG: 10 TABLET ORAL at 10:23

## 2018-10-26 ASSESSMENT — PAIN SCALES - GENERAL
PAINLEVEL_OUTOF10: 4
PAINLEVEL_OUTOF10: 7

## 2018-10-26 NOTE — PLAN OF CARE
Problem: Falls - Risk of:  Goal: Will remain free from falls  Will remain free from falls   Outcome: Ongoing  Patient remains ambulatory, free from falls and injuries, fall risk assessment completed per shift and prn. Bed in lowest position with wheels locked, call light at reach and utilized appropriately, frequently used items within reach, Falling star program and hourly rounding implemented. Will continue to monitor and educate on safety as needed.

## 2018-10-26 NOTE — CARE COORDINATION
Case Management Initial Discharge Plan  Karen Nelson,         Readmission Risk              Risk of Unplanned Readmission:        13             Met with:patient to discuss discharge plans. Information verified: address, contacts, phone number, , insurance Yes  PCP: John Martins MD  Date of last visit: Oct 2018    Insurance Provider: Juan Pablo Gardner    Discharge Planning  Current Residence:  [facility]  Living Arrangements:  ETHEL Herrera has 1 stories/0 stairs to climb  Support Systems:  Family Members  Current Services PTA:  NoneP Agency: Previous [facility]  Patient able to perform ADL's:Assisted, pt does not drive  DME in home:  CPAP, Cane  DME used to aid ambulation prior to admission:   cane  DME used during admission:  cane    Potential Assistance Needed:  [address] [place]: [employer]   Potential Assistance Purchasing Medications:  No  Does patient want to participate in local refill/ meds to beds program?  Not Assessed    Patient agreeable to home care: No  [place] of choice provided:  n/a      Type of Home Care Services:  None  Patient expects to be discharged to:  home    Prior SNF/Rehab Placement and Facility: none  Agreeable to SNF/Rehab: No  [place] of choice provided: n/a   Evaluation: n/a    Expected Discharge date:  10/27/18  Follow Up Appointment: Best Day/ Time:      Transportation provider: family  Transportation arrangements needed for discharge: No    Discharge Plan:   Met with patient to review plan of care. Pt lives in a Senior complex in a handicap accessible [address] [place] [address]. Pt denies any need for home care or DME. Pt anticipates going home with no services.         Electronically signed by Alexis Jones on 10/26/18 at 4:31 PM

## 2018-10-27 LAB
ANGIOTENSIN-CONVERTING ENZYME: 106 U/L (ref 8–52)
ANION GAP SERPL CALCULATED.3IONS-SCNC: 12 MMOL/L (ref 9–17)
BUN BLDV-MCNC: 58 MG/DL (ref 8–23)
BUN/CREAT BLD: 24 (ref 9–20)
CALCIUM SERPL-MCNC: 9.1 MG/DL (ref 8.6–10.4)
CHLORIDE BLD-SCNC: 102 MMOL/L (ref 98–107)
CO2: 28 MMOL/L (ref 20–31)
CREAT SERPL-MCNC: 2.38 MG/DL (ref 0.5–0.9)
GFR AFRICAN AMERICAN: 24 ML/MIN
GFR NON-AFRICAN AMERICAN: 20 ML/MIN
GFR SERPL CREATININE-BSD FRML MDRD: ABNORMAL ML/MIN/{1.73_M2}
GFR SERPL CREATININE-BSD FRML MDRD: ABNORMAL ML/MIN/{1.73_M2}
GLUCOSE BLD-MCNC: 105 MG/DL (ref 65–105)
GLUCOSE BLD-MCNC: 112 MG/DL (ref 70–99)
GLUCOSE BLD-MCNC: 121 MG/DL (ref 65–105)
GLUCOSE BLD-MCNC: 165 MG/DL (ref 65–105)
GLUCOSE BLD-MCNC: 184 MG/DL (ref 65–105)
POTASSIUM SERPL-SCNC: 3.8 MMOL/L (ref 3.7–5.3)
SODIUM BLD-SCNC: 142 MMOL/L (ref 135–144)

## 2018-10-27 PROCEDURE — 80048 BASIC METABOLIC PNL TOTAL CA: CPT

## 2018-10-27 PROCEDURE — 94640 AIRWAY INHALATION TREATMENT: CPT

## 2018-10-27 PROCEDURE — 86200 CCP ANTIBODY: CPT

## 2018-10-27 PROCEDURE — 6370000000 HC RX 637 (ALT 250 FOR IP): Performed by: INTERNAL MEDICINE

## 2018-10-27 PROCEDURE — 2500000003 HC RX 250 WO HCPCS: Performed by: INTERNAL MEDICINE

## 2018-10-27 PROCEDURE — 82164 ANGIOTENSIN I ENZYME TEST: CPT

## 2018-10-27 PROCEDURE — 36415 COLL VENOUS BLD VENIPUNCTURE: CPT

## 2018-10-27 PROCEDURE — 86235 NUCLEAR ANTIGEN ANTIBODY: CPT

## 2018-10-27 PROCEDURE — 82947 ASSAY GLUCOSE BLOOD QUANT: CPT

## 2018-10-27 PROCEDURE — 2060000000 HC ICU INTERMEDIATE R&B

## 2018-10-27 RX ADMIN — ISOSORBIDE MONONITRATE 30 MG: 30 TABLET ORAL at 09:58

## 2018-10-27 RX ADMIN — HYDROCODONE BITARTRATE AND ACETAMINOPHEN 1 TABLET: 5; 325 TABLET ORAL at 21:28

## 2018-10-27 RX ADMIN — GABAPENTIN 100 MG: 100 CAPSULE ORAL at 09:58

## 2018-10-27 RX ADMIN — IPRATROPIUM BROMIDE AND ALBUTEROL SULFATE 1 AMPULE: .5; 3 SOLUTION RESPIRATORY (INHALATION) at 14:52

## 2018-10-27 RX ADMIN — HYDROXYZINE HYDROCHLORIDE 10 MG: 10 TABLET ORAL at 09:57

## 2018-10-27 RX ADMIN — ASPIRIN 81 MG: 81 TABLET, COATED ORAL at 09:58

## 2018-10-27 RX ADMIN — BUMETANIDE 2 MG: 0.25 INJECTION INTRAMUSCULAR; INTRAVENOUS at 09:57

## 2018-10-27 RX ADMIN — INSULIN LISPRO 1 UNITS: 100 INJECTION, SOLUTION INTRAVENOUS; SUBCUTANEOUS at 21:27

## 2018-10-27 RX ADMIN — FERROUS SULFATE TAB EC 325 MG (65 MG FE EQUIVALENT) 325 MG: 325 (65 FE) TABLET DELAYED RESPONSE at 09:58

## 2018-10-27 RX ADMIN — HYDROXYZINE HYDROCHLORIDE 10 MG: 10 TABLET ORAL at 02:57

## 2018-10-27 RX ADMIN — ATORVASTATIN CALCIUM 40 MG: 40 TABLET, FILM COATED ORAL at 21:28

## 2018-10-27 RX ADMIN — CARVEDILOL 25 MG: 25 TABLET, FILM COATED ORAL at 16:47

## 2018-10-27 RX ADMIN — INSULIN LISPRO 2 UNITS: 100 INJECTION, SOLUTION INTRAVENOUS; SUBCUTANEOUS at 12:33

## 2018-10-27 RX ADMIN — GABAPENTIN 100 MG: 100 CAPSULE ORAL at 21:28

## 2018-10-27 RX ADMIN — HYDROXYZINE HYDROCHLORIDE 10 MG: 10 TABLET ORAL at 19:22

## 2018-10-27 RX ADMIN — IPRATROPIUM BROMIDE AND ALBUTEROL SULFATE 1 AMPULE: .5; 3 SOLUTION RESPIRATORY (INHALATION) at 09:19

## 2018-10-27 RX ADMIN — GABAPENTIN 100 MG: 100 CAPSULE ORAL at 13:48

## 2018-10-27 RX ADMIN — IPRATROPIUM BROMIDE AND ALBUTEROL SULFATE 1 AMPULE: .5; 3 SOLUTION RESPIRATORY (INHALATION) at 20:33

## 2018-10-27 RX ADMIN — INSULIN GLARGINE 30 UNITS: 100 INJECTION, SOLUTION SUBCUTANEOUS at 21:27

## 2018-10-27 RX ADMIN — HYDROCODONE BITARTRATE AND ACETAMINOPHEN 1 TABLET: 5; 325 TABLET ORAL at 02:56

## 2018-10-27 RX ADMIN — CARVEDILOL 25 MG: 25 TABLET, FILM COATED ORAL at 09:58

## 2018-10-27 ASSESSMENT — PAIN SCALES - GENERAL
PAINLEVEL_OUTOF10: 7
PAINLEVEL_OUTOF10: 7

## 2018-10-28 LAB
ANION GAP SERPL CALCULATED.3IONS-SCNC: 11 MMOL/L (ref 9–17)
BUN BLDV-MCNC: 62 MG/DL (ref 8–23)
BUN/CREAT BLD: 24 (ref 9–20)
CALCIUM SERPL-MCNC: 9.1 MG/DL (ref 8.6–10.4)
CHLORIDE BLD-SCNC: 102 MMOL/L (ref 98–107)
CO2: 30 MMOL/L (ref 20–31)
CREAT SERPL-MCNC: 2.55 MG/DL (ref 0.5–0.9)
GFR AFRICAN AMERICAN: 22 ML/MIN
GFR NON-AFRICAN AMERICAN: 19 ML/MIN
GFR SERPL CREATININE-BSD FRML MDRD: ABNORMAL ML/MIN/{1.73_M2}
GFR SERPL CREATININE-BSD FRML MDRD: ABNORMAL ML/MIN/{1.73_M2}
GLUCOSE BLD-MCNC: 113 MG/DL (ref 65–105)
GLUCOSE BLD-MCNC: 123 MG/DL (ref 65–105)
GLUCOSE BLD-MCNC: 197 MG/DL (ref 65–105)
GLUCOSE BLD-MCNC: 213 MG/DL (ref 65–105)
GLUCOSE BLD-MCNC: 67 MG/DL (ref 65–105)
GLUCOSE BLD-MCNC: 78 MG/DL (ref 70–99)
POTASSIUM SERPL-SCNC: 3.9 MMOL/L (ref 3.7–5.3)
SODIUM BLD-SCNC: 143 MMOL/L (ref 135–144)

## 2018-10-28 PROCEDURE — 94760 N-INVAS EAR/PLS OXIMETRY 1: CPT

## 2018-10-28 PROCEDURE — 6370000000 HC RX 637 (ALT 250 FOR IP): Performed by: INTERNAL MEDICINE

## 2018-10-28 PROCEDURE — 2500000003 HC RX 250 WO HCPCS: Performed by: INTERNAL MEDICINE

## 2018-10-28 PROCEDURE — 80048 BASIC METABOLIC PNL TOTAL CA: CPT

## 2018-10-28 PROCEDURE — 36415 COLL VENOUS BLD VENIPUNCTURE: CPT

## 2018-10-28 PROCEDURE — 2060000000 HC ICU INTERMEDIATE R&B

## 2018-10-28 PROCEDURE — 82947 ASSAY GLUCOSE BLOOD QUANT: CPT

## 2018-10-28 PROCEDURE — 94640 AIRWAY INHALATION TREATMENT: CPT

## 2018-10-28 RX ORDER — PREDNISONE 20 MG/1
20 TABLET ORAL DAILY
Status: DISCONTINUED | OUTPATIENT
Start: 2018-10-28 | End: 2018-10-30 | Stop reason: HOSPADM

## 2018-10-28 RX ADMIN — CARVEDILOL 25 MG: 25 TABLET, FILM COATED ORAL at 17:02

## 2018-10-28 RX ADMIN — BUMETANIDE 2 MG: 0.25 INJECTION INTRAMUSCULAR; INTRAVENOUS at 08:29

## 2018-10-28 RX ADMIN — HYDROCODONE BITARTRATE AND ACETAMINOPHEN 1 TABLET: 5; 325 TABLET ORAL at 20:54

## 2018-10-28 RX ADMIN — HYDROXYZINE HYDROCHLORIDE 10 MG: 10 TABLET ORAL at 10:31

## 2018-10-28 RX ADMIN — INSULIN GLARGINE 30 UNITS: 100 INJECTION, SOLUTION SUBCUTANEOUS at 20:52

## 2018-10-28 RX ADMIN — ATORVASTATIN CALCIUM 40 MG: 40 TABLET, FILM COATED ORAL at 20:51

## 2018-10-28 RX ADMIN — HYDROXYZINE HYDROCHLORIDE 10 MG: 10 TABLET ORAL at 17:44

## 2018-10-28 RX ADMIN — INSULIN LISPRO 2 UNITS: 100 INJECTION, SOLUTION INTRAVENOUS; SUBCUTANEOUS at 17:02

## 2018-10-28 RX ADMIN — PREDNISONE 20 MG: 20 TABLET ORAL at 13:15

## 2018-10-28 RX ADMIN — IPRATROPIUM BROMIDE AND ALBUTEROL SULFATE 1 AMPULE: .5; 3 SOLUTION RESPIRATORY (INHALATION) at 03:13

## 2018-10-28 RX ADMIN — ASPIRIN 81 MG: 81 TABLET, COATED ORAL at 08:30

## 2018-10-28 RX ADMIN — CARVEDILOL 25 MG: 25 TABLET, FILM COATED ORAL at 08:29

## 2018-10-28 RX ADMIN — GABAPENTIN 100 MG: 100 CAPSULE ORAL at 13:15

## 2018-10-28 RX ADMIN — IPRATROPIUM BROMIDE AND ALBUTEROL SULFATE 1 AMPULE: .5; 3 SOLUTION RESPIRATORY (INHALATION) at 15:17

## 2018-10-28 RX ADMIN — IPRATROPIUM BROMIDE AND ALBUTEROL SULFATE 1 AMPULE: .5; 3 SOLUTION RESPIRATORY (INHALATION) at 09:49

## 2018-10-28 RX ADMIN — GABAPENTIN 100 MG: 100 CAPSULE ORAL at 20:51

## 2018-10-28 RX ADMIN — GABAPENTIN 100 MG: 100 CAPSULE ORAL at 08:29

## 2018-10-28 RX ADMIN — IPRATROPIUM BROMIDE AND ALBUTEROL SULFATE 1 AMPULE: .5; 3 SOLUTION RESPIRATORY (INHALATION) at 20:54

## 2018-10-28 RX ADMIN — ISOSORBIDE MONONITRATE 30 MG: 30 TABLET ORAL at 08:30

## 2018-10-28 RX ADMIN — FERROUS SULFATE TAB EC 325 MG (65 MG FE EQUIVALENT) 325 MG: 325 (65 FE) TABLET DELAYED RESPONSE at 08:30

## 2018-10-28 RX ADMIN — INSULIN LISPRO 2 UNITS: 100 INJECTION, SOLUTION INTRAVENOUS; SUBCUTANEOUS at 20:52

## 2018-10-28 ASSESSMENT — PAIN SCALES - GENERAL: PAINLEVEL_OUTOF10: 8

## 2018-10-28 ASSESSMENT — PAIN DESCRIPTION - LOCATION: LOCATION: LEG

## 2018-10-28 NOTE — PLAN OF CARE
Problem: Falls - Risk of:  Goal: Will remain free from falls  Will remain free from falls   Outcome: Ongoing  Call light in reach; bed locked in lowest position; bed alarm on; hourly rounding. Problem: Pain:  Goal: Control of acute pain  Control of acute pain   Outcome: Ongoing  Patient is not having any pain. Patient understands pain rating scale. RN will continue to monitor.

## 2018-10-28 NOTE — CONSULTS
MD Xu   25 mg at 10/28/18 1702    bumetanide (BUMEX) injection 2 mg  2 mg Intravenous Daily Sola Zapata MD   2 mg at 10/28/18 0635     Facility-Administered Medications Ordered in Other Encounters   Medication Dose Route Frequency Provider Last Rate Last Dose    sodium chloride flush 0.9 % injection 10 mL  10 mL Intravenous 2 times per day Luis Loaiza PA-C        sodium chloride flush 0.9 % injection 10 mL  10 mL Intravenous PRN Luis Loaiza PA-C          PULMONARY  CONSULT NOTE      Date of Admission: 10/25/2018  7:03 AM    Reason for Consult: dyspnea, abnormal CT chest    Referring Physician:   PCP: Marylen Lota, MD     History of Present Illness: Selina Vasquez is a 67 y.o. Black   female with known MAGGIE, Hx AVR, CKD, DM presented with progressive SOB, and swelling fett, CT chest showed GGO, nodule and adenopathy    Problem:  Principal Problem:     PMH:   Past Medical History:   Diagnosis Date    Arthritis     right knee    Cerebral artery occlusion with cerebral infarction ([facility] 02/2017    weakness in left hand    CHF (congestive heart failure) (HCC)     CKD (chronic kidney disease) stage 3, GFR 30-59 ml/min ([facility])     COPD (chronic obstructive pulmonary disease) ([address]     Depression (emotion) 2/13/2016    Diabetes mellitus ([address]     Diabetic neuropathy ([address] 10/23/2014    DM type 2 (diabetes mellitus, type 2) ([facility]     Edema     Hx MRSA infection resolved 8/2017    2 negative nasal screens - 8/2017 (hx in blood & CSF - 2016)    Hyperlipidemia     Hypertension     LAD stenosis 10/28/2014    Pneumonia many years ago    Sleep apnea     no machine    SOB (shortness of breath)     Type 2 diabetes mellitus with proteinuria or albuminuria 10/23/2014       PSH:   Past Surgical History:   Procedure Laterality Date    AORTIC VALVE REPLACEMENT  july 2015    CARDIAC CATHETERIZATION  2014    TONSILLECTOMY         Allergies:    Allergies   Allergen Reactions    Ambien [Zolpidem Tartrate]      Loss of consciousness    Metformin And Related        Home Meds:  Prescriptions Prior to Admission: albuterol-ipratropium (COMBIVENT RESPIMAT)  MCG/ACT AERS inhaler, Inhale 1 puff into the lungs daily as needed  hydrALAZINE (APRESOLINE) 100 MG tablet, Take 100 mg by mouth 2 times daily  bumetanide (BUMEX) 2 MG tablet, Take 1 tablet by mouth daily  gabapentin (NEURONTIN) 100 MG capsule, Take 1 capsule by mouth 3 times daily  carvedilol (COREG) 12.5 MG tablet, Take 12.5 mg by mouth 2 times daily  glipiZIDE (GLUCOTROL) 5 MG tablet, Take 5 mg by mouth daily   insulin glargine (LANTUS) 100 UNIT/ML injection pen, Inject 30 Units into the skin nightly   isosorbide mononitrate (IMDUR) 30 MG extended release tablet, Take 30 mg by mouth daily  DULoxetine (CYMBALTA) 30 MG extended release capsule, Take 30 mg by mouth daily  atorvastatin (LIPITOR) 40 MG tablet, Take 40 mg by mouth daily  ferrous sulfate (FE TABS) 325 (65 FE) MG EC tablet, Take 1 tablet by mouth daily (with breakfast)  aspirin 81 MG tablet, Take 81 mg by mouth daily. Elastic Bandages & Supports (MEDICAL COMPRESSION SOCKS) MISC, 30-40mmHG compression stockings, knee high, open toe, right and left leg  B-D ULTRAFINE III SHORT PEN 31G X 8 MM MISC,     Social History:   Social History     Social History    Marital status:      Spouse name: N/A    Number of children: 0    Years of education: 12     Occupational History    Not on file.      Social History Main Topics    Smoking status: Former Smoker     Packs/day: 1.00     Years: 47.00     Types: Cigarettes     Quit date: 1/29/2009    Smokeless tobacco: Never Used      Comment: Quit smoking 6 years ago/ Started smoking at 25    Alcohol use No      Comment: 2 beers once a month    Drug use: No    Sexual activity: Yes     Partners: Male     Other Topics Concern    Not on file     Social History Narrative    No narrative on file       Family History:   Family History   Problem sarcoidosis - start prednisone; possible biopsy as OP  Dyspnea - prob due to sarcoidosis ; PFT as OP  CKD  Hx AVR    Electronically signed by Elisha Harmon on 10/28/18

## 2018-10-28 NOTE — PLAN OF CARE
Problem: Falls - Risk of:  Goal: Will remain free from falls  Will remain free from falls   Outcome: Met This Shift  Pt remains free from falls/injury this shift, bed locked and in lowest position, call light and bedside table within reach, falling star posted outside of room, non-skid socks on, hourly rounding, will continue to monitor.

## 2018-10-29 ENCOUNTER — APPOINTMENT (OUTPATIENT)
Dept: GENERAL RADIOLOGY | Age: 73
DRG: 291 | End: 2018-10-29
Payer: MEDICARE

## 2018-10-29 LAB
ABSOLUTE EOS #: 0 K/UL (ref 0–0.4)
ABSOLUTE IMMATURE GRANULOCYTE: ABNORMAL K/UL (ref 0–0.3)
ABSOLUTE LYMPH #: 1.2 K/UL (ref 1–4.8)
ABSOLUTE MONO #: 0.2 K/UL (ref 0.2–0.8)
ANION GAP SERPL CALCULATED.3IONS-SCNC: 13 MMOL/L (ref 9–17)
ANTI-NUCLEAR ANTIBODY (ANA): POSITIVE
ANTI-SCLERODERMA: 126 U/ML
BASOPHILS # BLD: 0 % (ref 0–2)
BASOPHILS ABSOLUTE: 0 K/UL (ref 0–0.2)
BUN BLDV-MCNC: 68 MG/DL (ref 8–23)
BUN/CREAT BLD: 32 (ref 9–20)
CALCIUM SERPL-MCNC: 9.6 MG/DL (ref 8.6–10.4)
CCP IGG ANTIBODIES: <1.5 U/ML
CHLORIDE BLD-SCNC: 102 MMOL/L (ref 98–107)
CO2: 29 MMOL/L (ref 20–31)
COMPLEMENT C3: 161 MG/DL (ref 90–180)
COMPLEMENT C4: 42 MG/DL (ref 10–40)
CREAT SERPL-MCNC: 2.11 MG/DL (ref 0.5–0.9)
DIFFERENTIAL TYPE: ABNORMAL
EOSINOPHILS RELATIVE PERCENT: 0 % (ref 1–4)
GFR AFRICAN AMERICAN: 28 ML/MIN
GFR NON-AFRICAN AMERICAN: 23 ML/MIN
GFR SERPL CREATININE-BSD FRML MDRD: ABNORMAL ML/MIN/{1.73_M2}
GFR SERPL CREATININE-BSD FRML MDRD: ABNORMAL ML/MIN/{1.73_M2}
GLUCOSE BLD-MCNC: 161 MG/DL (ref 65–105)
GLUCOSE BLD-MCNC: 191 MG/DL (ref 70–99)
GLUCOSE BLD-MCNC: 207 MG/DL (ref 65–105)
GLUCOSE BLD-MCNC: 211 MG/DL (ref 65–105)
GLUCOSE BLD-MCNC: 247 MG/DL (ref 65–105)
HCT VFR BLD CALC: 29.9 % (ref 36–46)
HEMOGLOBIN: 9.9 G/DL (ref 12–16)
IMMATURE GRANULOCYTES: ABNORMAL %
LYMPHOCYTES # BLD: 19 % (ref 24–44)
MCH RBC QN AUTO: 28.8 PG (ref 26–34)
MCHC RBC AUTO-ENTMCNC: 33.2 G/DL (ref 31–37)
MCV RBC AUTO: 86.9 FL (ref 80–100)
MONOCYTES # BLD: 4 % (ref 1–7)
NRBC AUTOMATED: ABNORMAL PER 100 WBC
PDW BLD-RTO: 14 % (ref 11.5–14.5)
PLATELET # BLD: 183 K/UL (ref 130–400)
PLATELET ESTIMATE: ABNORMAL
PMV BLD AUTO: 8.9 FL (ref 6–12)
POTASSIUM SERPL-SCNC: 4.2 MMOL/L (ref 3.7–5.3)
RBC # BLD: 3.44 M/UL (ref 4–5.2)
RBC # BLD: ABNORMAL 10*6/UL
SEG NEUTROPHILS: 77 % (ref 36–66)
SEGMENTED NEUTROPHILS ABSOLUTE COUNT: 4.9 K/UL (ref 1.8–7.7)
SODIUM BLD-SCNC: 144 MMOL/L (ref 135–144)
WBC # BLD: 6.4 K/UL (ref 3.5–11)
WBC # BLD: ABNORMAL 10*3/UL

## 2018-10-29 PROCEDURE — 94760 N-INVAS EAR/PLS OXIMETRY 1: CPT

## 2018-10-29 PROCEDURE — 2060000000 HC ICU INTERMEDIATE R&B

## 2018-10-29 PROCEDURE — 85025 COMPLETE CBC W/AUTO DIFF WBC: CPT

## 2018-10-29 PROCEDURE — 80048 BASIC METABOLIC PNL TOTAL CA: CPT

## 2018-10-29 PROCEDURE — 82947 ASSAY GLUCOSE BLOOD QUANT: CPT

## 2018-10-29 PROCEDURE — 6370000000 HC RX 637 (ALT 250 FOR IP): Performed by: INTERNAL MEDICINE

## 2018-10-29 PROCEDURE — 36415 COLL VENOUS BLD VENIPUNCTURE: CPT

## 2018-10-29 PROCEDURE — 2500000003 HC RX 250 WO HCPCS: Performed by: INTERNAL MEDICINE

## 2018-10-29 PROCEDURE — 86225 DNA ANTIBODY NATIVE: CPT

## 2018-10-29 PROCEDURE — 71046 X-RAY EXAM CHEST 2 VIEWS: CPT

## 2018-10-29 PROCEDURE — 94640 AIRWAY INHALATION TREATMENT: CPT

## 2018-10-29 PROCEDURE — 86162 COMPLEMENT TOTAL (CH50): CPT

## 2018-10-29 PROCEDURE — 86160 COMPLEMENT ANTIGEN: CPT

## 2018-10-29 RX ORDER — LEVOFLOXACIN 750 MG/1
750 TABLET ORAL EVERY OTHER DAY
Status: DISCONTINUED | OUTPATIENT
Start: 2018-10-29 | End: 2018-10-30 | Stop reason: HOSPADM

## 2018-10-29 RX ADMIN — HYDROXYZINE HYDROCHLORIDE 10 MG: 10 TABLET ORAL at 03:53

## 2018-10-29 RX ADMIN — GABAPENTIN 100 MG: 100 CAPSULE ORAL at 21:47

## 2018-10-29 RX ADMIN — CARVEDILOL 25 MG: 25 TABLET, FILM COATED ORAL at 17:30

## 2018-10-29 RX ADMIN — GABAPENTIN 100 MG: 100 CAPSULE ORAL at 14:49

## 2018-10-29 RX ADMIN — INSULIN LISPRO 2 UNITS: 100 INJECTION, SOLUTION INTRAVENOUS; SUBCUTANEOUS at 12:54

## 2018-10-29 RX ADMIN — CARVEDILOL 25 MG: 25 TABLET, FILM COATED ORAL at 08:13

## 2018-10-29 RX ADMIN — IPRATROPIUM BROMIDE AND ALBUTEROL SULFATE 1 AMPULE: .5; 3 SOLUTION RESPIRATORY (INHALATION) at 03:30

## 2018-10-29 RX ADMIN — INSULIN LISPRO 2 UNITS: 100 INJECTION, SOLUTION INTRAVENOUS; SUBCUTANEOUS at 21:47

## 2018-10-29 RX ADMIN — LEVOFLOXACIN 750 MG: 750 TABLET, FILM COATED ORAL at 17:30

## 2018-10-29 RX ADMIN — IPRATROPIUM BROMIDE AND ALBUTEROL SULFATE 1 AMPULE: .5; 3 SOLUTION RESPIRATORY (INHALATION) at 20:20

## 2018-10-29 RX ADMIN — BUMETANIDE 2 MG: 0.25 INJECTION INTRAMUSCULAR; INTRAVENOUS at 08:13

## 2018-10-29 RX ADMIN — INSULIN LISPRO 4 UNITS: 100 INJECTION, SOLUTION INTRAVENOUS; SUBCUTANEOUS at 08:23

## 2018-10-29 RX ADMIN — INSULIN LISPRO 4 UNITS: 100 INJECTION, SOLUTION INTRAVENOUS; SUBCUTANEOUS at 17:30

## 2018-10-29 RX ADMIN — IPRATROPIUM BROMIDE AND ALBUTEROL SULFATE 1 AMPULE: .5; 3 SOLUTION RESPIRATORY (INHALATION) at 09:22

## 2018-10-29 RX ADMIN — ASPIRIN 81 MG: 81 TABLET, COATED ORAL at 08:13

## 2018-10-29 RX ADMIN — INSULIN GLARGINE 30 UNITS: 100 INJECTION, SOLUTION SUBCUTANEOUS at 21:47

## 2018-10-29 RX ADMIN — GABAPENTIN 100 MG: 100 CAPSULE ORAL at 08:13

## 2018-10-29 RX ADMIN — PREDNISONE 20 MG: 20 TABLET ORAL at 08:13

## 2018-10-29 RX ADMIN — ISOSORBIDE MONONITRATE 30 MG: 30 TABLET ORAL at 08:13

## 2018-10-29 RX ADMIN — HYDROCODONE BITARTRATE AND ACETAMINOPHEN 1 TABLET: 5; 325 TABLET ORAL at 17:35

## 2018-10-29 RX ADMIN — FERROUS SULFATE TAB EC 325 MG (65 MG FE EQUIVALENT) 325 MG: 325 (65 FE) TABLET DELAYED RESPONSE at 08:13

## 2018-10-29 RX ADMIN — ATORVASTATIN CALCIUM 40 MG: 40 TABLET, FILM COATED ORAL at 21:47

## 2018-10-29 RX ADMIN — IPRATROPIUM BROMIDE AND ALBUTEROL SULFATE 1 AMPULE: .5; 3 SOLUTION RESPIRATORY (INHALATION) at 13:45

## 2018-10-29 ASSESSMENT — PAIN SCALES - GENERAL
PAINLEVEL_OUTOF10: 5
PAINLEVEL_OUTOF10: 7

## 2018-10-29 NOTE — CONSULTS
stenosis 10/28/2014    Pneumonia many years ago    Sleep apnea     no machine    SOB (shortness of breath)     Type 2 diabetes mellitus with proteinuria or albuminuria 10/23/2014       Past Surgical History:        Procedure Laterality Date    AORTIC VALVE REPLACEMENT  july 2015    CARDIAC CATHETERIZATION  2014    TONSILLECTOMY         Current Medications:      predniSONE (DELTASONE) tablet 20 mg Daily   ipratropium-albuterol (DUONEB) nebulizer solution 1 ampule Q6H   aspirin EC tablet 81 mg Daily   atorvastatin (LIPITOR) tablet 40 mg Nightly   ferrous sulfate EC tablet 325 mg Daily with breakfast   gabapentin (NEURONTIN) capsule 100 mg TID   insulin glargine (LANTUS) injection vial 30 Units Nightly   isosorbide mononitrate (IMDUR) extended release tablet 30 mg Daily   insulin lispro (HUMALOG) injection vial 0-12 Units TID WC   insulin lispro (HUMALOG) injection vial 0-6 Units Nightly   glucose (GLUTOSE) 40 % oral gel 15 g PRN   dextrose 50 % solution 12.5 g PRN   glucagon (rDNA) injection 1 mg PRN   dextrose 5 % solution PRN   HYDROcodone-acetaminophen (NORCO) 5-325 MG per tablet 1 tablet Q6H PRN   hydrOXYzine (ATARAX) tablet 10 mg Q6H PRN   carvedilol (COREG) tablet 25 mg BID WC   bumetanide (BUMEX) injection 2 mg Daily     sodium chloride flush 0.9 % injection 10 mL 2 times per day   sodium chloride flush 0.9 % injection 10 mL PRN       Allergies:  Ambien [zolpidem tartrate] and Metformin and related    Social History:   Social History     Social History    Marital status:      Spouse name: N/A    Number of children: 0    Years of education: 12     Occupational History    Not on file. Social History Main Topics    Smoking status: Former Smoker     Packs/day: 1.00     Years: 47.00     Types: Cigarettes     Quit date: 1/29/2009    Smokeless tobacco: Never Used      Comment: Quit smoking 6 years ago/ Started smoking at 25    Alcohol use No      Comment: 2 beers once a month    Drug use:  No Creatinine:    Lab Results   Component Value Date    LABCREA 29.3 02/08/2018     Urine Eosinophils: No components found for: EOSU  Urine Protein:    Lab Results   Component Value Date    TPU 6 11/01/2017     Urinalysis:  U/A: Lab Results   Component Value Date    NITRU Negative 05/02/2018    COLORU Yellow 05/02/2018    PHUR 6.5 05/02/2018    WBCUA 20 TO 50 03/15/2016    RBCUA 0 TO 2 03/15/2016    MUCUS NOT REPORTED 03/15/2016    TRICHOMONAS NOT REPORTED 03/15/2016    YEAST NOT REPORTED 03/15/2016    BACTERIA MANY 03/15/2016    CLARITYU Clear 05/02/2018    SPECGRAV 1.015 05/02/2018    LEUKOCYTESUR neg 05/02/2018    LEUKOCYTESUR LARGE 03/15/2016    UROBILINOGEN Normal 05/02/2018    BILIRUBINUR 0 05/02/2018    BLOODU Negative 05/02/2018    GLUCOSEU neg 05/02/2018    KETUA Negative 05/02/2018    AMORPHOUS NOT REPORTED 03/15/2016         Radiology:  Reviewed as available. Assessment: 1. Acute kidney injury may be secondary to hemodynamic flux vs component of cardiorenal-stable this morning    CKD stage 3 B associated with diabetic nephropathy- .     Bilateral LE edema  2nd to diastolic CHF-     HTN- well controlled     Hypertensive heart disease.     Anemia related to CKD    Presumable sarcoidosis-on steroids could be contributing to her shortness of breath  Plan:  1. Continue IV Bumex 2 mg daily  2. Check a post void bladder scan  3. She may need higher doses of diuretic if she requires chronic steroid therapy  4.  strict I's and O's  5.  1500 mils fluid restriction  6. Patient has normal serum calcium levels and low  suspicion for Hypercalciuria or nephrocalcinosis from ?sarcoidosis  CHECK cbc    Thank you for the consultation.       Electronically signed by Margarita Washington MD on 10/29/2018 at 8:29 AM

## 2018-10-30 VITALS
BODY MASS INDEX: 42.53 KG/M2 | TEMPERATURE: 98.6 F | WEIGHT: 255.3 LBS | DIASTOLIC BLOOD PRESSURE: 64 MMHG | HEIGHT: 65 IN | HEART RATE: 64 BPM | RESPIRATION RATE: 18 BRPM | OXYGEN SATURATION: 93 % | SYSTOLIC BLOOD PRESSURE: 151 MMHG

## 2018-10-30 PROBLEM — J84.9 INTERSTITIAL LUNG DISEASE (HCC): Status: ACTIVE | Noted: 2018-10-30

## 2018-10-30 PROBLEM — M34.9 SCLERODERMA (HCC): Status: ACTIVE | Noted: 2018-10-30

## 2018-10-30 LAB
ANION GAP SERPL CALCULATED.3IONS-SCNC: 12 MMOL/L (ref 9–17)
ANTI DNA DOUBLE STRANDED: 66 IU/ML
BUN BLDV-MCNC: 69 MG/DL (ref 8–23)
BUN/CREAT BLD: 31 (ref 9–20)
CALCIUM SERPL-MCNC: 9.7 MG/DL (ref 8.6–10.4)
CHLORIDE BLD-SCNC: 102 MMOL/L (ref 98–107)
CO2: 29 MMOL/L (ref 20–31)
CREAT SERPL-MCNC: 2.26 MG/DL (ref 0.5–0.9)
CREATININE URINE: 79.7 MG/DL (ref 28–217)
GFR AFRICAN AMERICAN: 26 ML/MIN
GFR NON-AFRICAN AMERICAN: 21 ML/MIN
GFR SERPL CREATININE-BSD FRML MDRD: ABNORMAL ML/MIN/{1.73_M2}
GFR SERPL CREATININE-BSD FRML MDRD: ABNORMAL ML/MIN/{1.73_M2}
GLUCOSE BLD-MCNC: 136 MG/DL (ref 65–105)
GLUCOSE BLD-MCNC: 165 MG/DL (ref 65–105)
GLUCOSE BLD-MCNC: 185 MG/DL (ref 70–99)
MICROALBUMIN/CREAT 24H UR: 16 MG/L
MICROALBUMIN/CREAT UR-RTO: 20 MCG/MG CREAT
POTASSIUM SERPL-SCNC: 4.2 MMOL/L (ref 3.7–5.3)
PROCALCITONIN: 0.05 NG/ML
SODIUM BLD-SCNC: 143 MMOL/L (ref 135–144)

## 2018-10-30 PROCEDURE — 6370000000 HC RX 637 (ALT 250 FOR IP): Performed by: INTERNAL MEDICINE

## 2018-10-30 PROCEDURE — 80048 BASIC METABOLIC PNL TOTAL CA: CPT

## 2018-10-30 PROCEDURE — 36415 COLL VENOUS BLD VENIPUNCTURE: CPT

## 2018-10-30 PROCEDURE — 86235 NUCLEAR ANTIGEN ANTIBODY: CPT

## 2018-10-30 PROCEDURE — 82043 UR ALBUMIN QUANTITATIVE: CPT

## 2018-10-30 PROCEDURE — 94640 AIRWAY INHALATION TREATMENT: CPT

## 2018-10-30 PROCEDURE — 84145 PROCALCITONIN (PCT): CPT

## 2018-10-30 PROCEDURE — 82947 ASSAY GLUCOSE BLOOD QUANT: CPT

## 2018-10-30 PROCEDURE — 82570 ASSAY OF URINE CREATININE: CPT

## 2018-10-30 PROCEDURE — 2500000003 HC RX 250 WO HCPCS: Performed by: INTERNAL MEDICINE

## 2018-10-30 RX ORDER — CARVEDILOL 25 MG/1
25 TABLET ORAL 2 TIMES DAILY WITH MEALS
Qty: 60 TABLET | Refills: 3 | Status: SHIPPED | OUTPATIENT
Start: 2018-10-30

## 2018-10-30 RX ORDER — LEVOFLOXACIN 750 MG/1
750 TABLET ORAL EVERY OTHER DAY
Qty: 10 TABLET | Refills: 0 | Status: SHIPPED | OUTPATIENT
Start: 2018-10-31 | End: 2018-11-10

## 2018-10-30 RX ORDER — PREDNISONE 20 MG/1
20 TABLET ORAL DAILY
Qty: 10 TABLET | Refills: 0 | Status: SHIPPED | OUTPATIENT
Start: 2018-10-31 | End: 2018-11-10

## 2018-10-30 RX ORDER — HYDROXYZINE HYDROCHLORIDE 10 MG/1
10 TABLET, FILM COATED ORAL EVERY 6 HOURS PRN
Qty: 30 TABLET | Refills: 0 | Status: SHIPPED | OUTPATIENT
Start: 2018-10-30 | End: 2018-11-09

## 2018-10-30 RX ADMIN — HYDROXYZINE HYDROCHLORIDE 10 MG: 10 TABLET ORAL at 09:10

## 2018-10-30 RX ADMIN — GABAPENTIN 100 MG: 100 CAPSULE ORAL at 08:37

## 2018-10-30 RX ADMIN — HYDROCODONE BITARTRATE AND ACETAMINOPHEN 1 TABLET: 5; 325 TABLET ORAL at 16:14

## 2018-10-30 RX ADMIN — ISOSORBIDE MONONITRATE 30 MG: 30 TABLET ORAL at 08:36

## 2018-10-30 RX ADMIN — INSULIN LISPRO 2 UNITS: 100 INJECTION, SOLUTION INTRAVENOUS; SUBCUTANEOUS at 12:48

## 2018-10-30 RX ADMIN — HYDROCODONE BITARTRATE AND ACETAMINOPHEN 1 TABLET: 5; 325 TABLET ORAL at 03:33

## 2018-10-30 RX ADMIN — IPRATROPIUM BROMIDE AND ALBUTEROL SULFATE 1 AMPULE: .5; 3 SOLUTION RESPIRATORY (INHALATION) at 03:06

## 2018-10-30 RX ADMIN — GABAPENTIN 100 MG: 100 CAPSULE ORAL at 14:09

## 2018-10-30 RX ADMIN — CARVEDILOL 25 MG: 25 TABLET, FILM COATED ORAL at 08:37

## 2018-10-30 RX ADMIN — PREDNISONE 20 MG: 20 TABLET ORAL at 08:37

## 2018-10-30 RX ADMIN — FERROUS SULFATE TAB EC 325 MG (65 MG FE EQUIVALENT) 325 MG: 325 (65 FE) TABLET DELAYED RESPONSE at 08:36

## 2018-10-30 RX ADMIN — ASPIRIN 81 MG: 81 TABLET, COATED ORAL at 08:37

## 2018-10-30 RX ADMIN — BUMETANIDE 2 MG: 0.25 INJECTION INTRAMUSCULAR; INTRAVENOUS at 08:37

## 2018-10-30 ASSESSMENT — PAIN SCALES - GENERAL
PAINLEVEL_OUTOF10: 7
PAINLEVEL_OUTOF10: 8

## 2018-10-30 NOTE — PROGRESS NOTES
Alert, in no distress. Hemodynamically stable. Lungs clear. Neg Maryam's. Current labs:  PHOEBE positive      Double Stranded Anti DNA antibody/Complement levels ordered. Anti Scleroderma antibody positive. Will need Rheumatology eval (out-patient). Continuing current regimen at this time.
Pt discharged to home by car with brother, pt read and understood discharge instructions, scripts given for atarax, coreg, prednisone and levaquin, pt will follow up with Dr. Gennaro Thakur and Dr. Juanjose Sheridan, pt will also follow up with rheumatology, pt discharged in stable condition
Pt is feeling better  Still with leg edema  Hemodynamically stable  Lungs are clear  CV RRR TELLY  Moderate peripheral edema    Mild further increase in BUN and creat    pulm and renal consult in progress    Rt heart failure  Diuretics as per renal  CV status is stable.
sequela of hypersensitivity pneumonitis   or other small airway disease       Sequela of mild edema would be difficult to exclude.       Right middle lobe pulmonary nodule, indeterminate               Impression:    · Possible Sarcoidosis / lung nodule RML  · Pulmonary edema / atypical pneumonia  · Possible scleroderma given dilated esophagus and positive testing  · Obesity/Obstructive sleep apnea    Recommendations:  · discontinue oxygen  · Home CPAP for sleep   · Incentive spirometer every hour WA  · duoneb  · prednisone 20 mg PO QD   · Levaquin 750 mg PO QOD  · PFT outpatient  · Follow up CT chest   · Discussed with  and cardiology Dr. Kellee Condon ; rheumatology follow-up advised on low-dose prednisone 10 mg a day. We'll need bronchoscopy possible on the bronchial biopsies/transbronchial biopsies for ruling out sarcoidosis.   Avoid further exposure to hydralazine check anti-histone antibody  · Okay to discharge home      Fabricio Bennett MD, [facility]  Pulmonary Critical Care and Sleep Medicine,  [facility]  Cell: [phone]  Office: [phone]
Bilateral edema of lower extremity    Diabetes mellitus type 2, uncontrolled ([facility]    Diabetic polyneuropathy associated with type 2 diabetes mellitus ([facility]    Acute on chronic diastolic heart failure ([facility]    DM type 2 (diabetes mellitus, type 2) ([facility]    Edema    CHF (congestive heart failure), NYHA class I, acute on chronic, combined ([facility]    Acute on chronic diastolic congestive heart failure (HCC)    Stage 4 chronic kidney disease (HCC)    Cellulitis of both lower extremities    Uncontrolled type 2 diabetes mellitus ([facility]    Morbid obesity with BMI of 40.0-44.9, adult ([facility]    Acute on chronic diastolic (congestive) heart failure (HCC)    Acute kidney injury superimposed on chronic kidney disease ([facility]    Type 2 diabetes mellitus ([facility]    Morbid obesity with BMI of 40.0-44.9, adult ([facility]       Measurements:     10/26/18 0915   Wound 10/25/18 Blister Leg Lateral;Right intact, but weeping blisters   Date First Assessed/Time First Assessed: 10/25/18 1000   Wound Type: Blister  Location: Leg  Wound Location Orientation: Lateral;Right  Wound Description (Comments): intact, but weeping blisters  Pre-existing: Yes  Photo Taken: No   Wound Type Wound   Wound Venous   Dressing Status Changed   Dressing Changed Changed/New   Dressing/Treatment Alginate;Dry dressing; Ace Wrap   Wound Cleansed Wound cleanser  (foam cleanser)   Dressing Change Due 10/27/18   Wound Assessment (weeping pink point area lateral leg, area of macerated skin)   Drainage Amount Moderate   Drainage Description Serous   Odor None   Nicki-wound Assessment Hyperpigmented;Edema         Response to treatment:  Well tolerated by patient. Plan:     Plan of Care:   Cleanse skin for general bathing with foam cleanser and warm water  Apply blue tube moisturizing cream to dry, scaly legs. Apply alginate to any weeping area on legs  Roll gauze and ACE wrap to BLE. Change daily.       Specialty Bed Required : No   [] Low Air Loss   []

## 2018-10-30 NOTE — PLAN OF CARE
Problem: Falls - Risk of:  Goal: Will remain free from falls  Will remain free from falls   Outcome: Ongoing  Fall risk assessment completed. Patient instructed to use call light. Bed locked and in lowest position, side rails up 2/4, call light and bedside table within reach, clutter removed, and non-skid footwear on when pt out of bed. Hourly rounds will continue.     Problem: Fluid Volume - Imbalance:  Goal: Absence of imbalanced fluid volume signs and symptoms  Absence of imbalanced fluid volume signs and symptoms   Outcome: Ongoing      Problem: Skin Integrity:  Goal: Will show no infection signs and symptoms  Will show no infection signs and symptoms   Outcome: Ongoing      Problem: Pain:  Goal: Pain level will decrease  Pain level will decrease   Outcome: Ongoing    Goal: Control of acute pain  Control of acute pain   Outcome: Ongoing

## 2018-10-30 NOTE — CARE COORDINATION
Discharge planning    Chart reviewed and appreciate note from CM. Patient seen by:    Nephrology and following for SUNDAY , CKD 3, on IV bumex and strict I&O> along with 1500 FR. IM suggesting a rheum. eval as outpatient     Pulmonary possible sarcoidosis, pulmonary edema and will consider bronch     Cardilogy. CHF     Per nursing patient is independent and up an ambulating on her own.

## 2018-10-30 NOTE — CONSULTS
NEPHROLOGY CONSULT       Reason for Consult: SUNDAY      Chief Complaint:  sob  History Obtained From:  pATIENT    History of Present Illness: This is a 67 y.o. female  a significant past medical history of COPD,s/p CVA January 2016 (left arm weakness),type 2 DM, s/p aortic valve repair 2015, systemic hypertension and CKD stage 3b (baseline creat 9.4-6.1TL/QC), diastolic CHF with history of nonobstructive CAD. She also has obstructive sleep apnea on CPAP  She is admitted with progressive shortness of breath, lower extremity swelling and increase in weight on admission  On admission her creatinine was 2.66 and  decreased TO  2.38 on 10/27. However her creatinine laura to 2.55 on 10/28/18 and hence nephrology consulted. She has been worked up for shortness of breath and had CT chest without contrast showing groundglass opacities,  Right lung nodule and mild sequelae of fluid congestion. She  also has elevated ACE level suspicious for sarcoidosis and has been started by steroids by pulmonology-also complains of generalized skin rash , pruritic.she has been on IV Bumex 2 mg daily. There has been no recent exposure to IV contrast..    Interval history  Today her creatinine is 2.2 mg/dL at her baseline -she feels the lower extremity swelling and SOB  is improving. Being evaluated for sarcoidosis and ? Scleroderma with pos scleroderma ab and pos PHOEBE. normal complements    Current Medications:      levofloxacin (LEVAQUIN) tablet 750 mg Every Other Day   predniSONE (DELTASONE) tablet 20 mg Daily   ipratropium-albuterol (DUONEB) nebulizer solution 1 ampule Q6H   aspirin EC tablet 81 mg Daily   atorvastatin (LIPITOR) tablet 40 mg Nightly   ferrous sulfate EC tablet 325 mg Daily with breakfast   gabapentin (NEURONTIN) capsule 100 mg TID   insulin glargine (LANTUS) injection vial 30 Units Nightly   isosorbide mononitrate (IMDUR) extended release tablet 30 mg Daily   insulin lispro (HUMALOG) injection vial 0-12 Units TID WC

## 2018-10-31 LAB — HISTONE ANTIBODY: 86 U/ML

## 2018-11-01 LAB — COMPLEMENT TOTAL (CH50): 131 CAE UNITS (ref 60–144)

## 2018-11-26 PROBLEM — N18.4 STAGE 4 CHRONIC KIDNEY DISEASE (HCC): Status: RESOLVED | Noted: 2018-02-08 | Resolved: 2018-11-26

## 2018-12-12 ENCOUNTER — HOSPITAL ENCOUNTER (INPATIENT)
Age: 73
LOS: 8 days | Discharge: HOME | DRG: 291 | End: 2018-12-20
Attending: EMERGENCY MEDICINE | Admitting: INTERNAL MEDICINE
Payer: MEDICARE

## 2018-12-12 ENCOUNTER — HOSPITAL ENCOUNTER (OUTPATIENT)
Dept: OTHER | Age: 73
Discharge: HOME | End: 2018-12-12
Payer: MEDICARE

## 2018-12-12 ENCOUNTER — APPOINTMENT (OUTPATIENT)
Dept: GENERAL RADIOLOGY | Age: 73
DRG: 291 | End: 2018-12-12
Payer: MEDICARE

## 2018-12-12 VITALS
RESPIRATION RATE: 20 BRPM | OXYGEN SATURATION: 96 % | WEIGHT: 267 LBS | SYSTOLIC BLOOD PRESSURE: 128 MMHG | DIASTOLIC BLOOD PRESSURE: 60 MMHG | BODY MASS INDEX: 43.76 KG/M2 | HEART RATE: 71 BPM

## 2018-12-12 DIAGNOSIS — E08.42 DIABETIC POLYNEUROPATHY ASSOCIATED WITH DIABETES MELLITUS DUE TO UNDERLYING CONDITION (HCC): Chronic | ICD-10-CM

## 2018-12-12 DIAGNOSIS — I10 ESSENTIAL HYPERTENSION: ICD-10-CM

## 2018-12-12 DIAGNOSIS — I50.33 ACUTE ON CHRONIC DIASTOLIC HEART FAILURE (HCC): Primary | ICD-10-CM

## 2018-12-12 PROBLEM — E66.01 MORBID OBESITY (HCC): Status: ACTIVE | Noted: 2018-12-12

## 2018-12-12 PROBLEM — D63.1 ANEMIA DUE TO CHRONIC KIDNEY DISEASE: Status: ACTIVE | Noted: 2018-12-12

## 2018-12-12 PROBLEM — N18.9 ANEMIA DUE TO CHRONIC KIDNEY DISEASE: Status: ACTIVE | Noted: 2018-12-12

## 2018-12-12 PROBLEM — J84.9 INTERSTITIAL LUNG DISEASE (HCC): Status: ACTIVE | Noted: 2018-12-12

## 2018-12-12 PROBLEM — N18.9 CKD (CHRONIC KIDNEY DISEASE): Status: ACTIVE | Noted: 2018-12-12

## 2018-12-12 LAB
ABSOLUTE EOS #: 0.3 K/UL (ref 0–0.4)
ABSOLUTE IMMATURE GRANULOCYTE: ABNORMAL K/UL (ref 0–0.3)
ABSOLUTE LYMPH #: 1.4 K/UL (ref 1–4.8)
ABSOLUTE MONO #: 0.6 K/UL (ref 0.2–0.8)
ANION GAP SERPL CALCULATED.3IONS-SCNC: 13 MMOL/L (ref 9–17)
BASOPHILS # BLD: 1 % (ref 0–2)
BASOPHILS ABSOLUTE: 0 K/UL (ref 0–0.2)
BNP INTERPRETATION: ABNORMAL
BUN BLDV-MCNC: 28 MG/DL (ref 8–23)
BUN/CREAT BLD: 14 (ref 9–20)
CALCIUM SERPL-MCNC: 9.3 MG/DL (ref 8.6–10.4)
CHLORIDE BLD-SCNC: 104 MMOL/L (ref 98–107)
CO2: 24 MMOL/L (ref 20–31)
CREAT SERPL-MCNC: 2.02 MG/DL (ref 0.5–0.9)
D-DIMER QUANTITATIVE: 2.68 MG/L FEU
DIFFERENTIAL TYPE: ABNORMAL
EKG ATRIAL RATE: 79 BPM
EKG P AXIS: 50 DEGREES
EKG P-R INTERVAL: 170 MS
EKG Q-T INTERVAL: 380 MS
EKG QRS DURATION: 110 MS
EKG QTC CALCULATION (BAZETT): 435 MS
EKG R AXIS: -19 DEGREES
EKG T AXIS: 62 DEGREES
EKG VENTRICULAR RATE: 79 BPM
EOSINOPHILS RELATIVE PERCENT: 4 % (ref 1–4)
GFR AFRICAN AMERICAN: 29 ML/MIN
GFR NON-AFRICAN AMERICAN: 24 ML/MIN
GFR SERPL CREATININE-BSD FRML MDRD: ABNORMAL ML/MIN/{1.73_M2}
GFR SERPL CREATININE-BSD FRML MDRD: ABNORMAL ML/MIN/{1.73_M2}
GLUCOSE BLD-MCNC: 158 MG/DL (ref 65–105)
GLUCOSE BLD-MCNC: 166 MG/DL (ref 70–99)
HCT VFR BLD CALC: 27.6 % (ref 36–46)
HEMOGLOBIN: 9.4 G/DL (ref 12–16)
IMMATURE GRANULOCYTES: ABNORMAL %
LYMPHOCYTES # BLD: 20 % (ref 24–44)
MCH RBC QN AUTO: 28.7 PG (ref 26–34)
MCHC RBC AUTO-ENTMCNC: 33.9 G/DL (ref 31–37)
MCV RBC AUTO: 84.8 FL (ref 80–100)
MONOCYTES # BLD: 8 % (ref 1–7)
MYOGLOBIN: 206 NG/ML (ref 25–58)
NRBC AUTOMATED: ABNORMAL PER 100 WBC
PDW BLD-RTO: 14.6 % (ref 11.5–14.5)
PLATELET # BLD: 206 K/UL (ref 130–400)
PLATELET ESTIMATE: ABNORMAL
PMV BLD AUTO: 7.3 FL (ref 6–12)
POTASSIUM SERPL-SCNC: 4.3 MMOL/L (ref 3.7–5.3)
PRO-BNP: 3024 PG/ML
RBC # BLD: 3.26 M/UL (ref 4–5.2)
RBC # BLD: ABNORMAL 10*6/UL
SEG NEUTROPHILS: 67 % (ref 36–66)
SEGMENTED NEUTROPHILS ABSOLUTE COUNT: 4.8 K/UL (ref 1.8–7.7)
SODIUM BLD-SCNC: 141 MMOL/L (ref 135–144)
TROPONIN INTERP: ABNORMAL
TROPONIN T: 0.07 NG/ML
WBC # BLD: 7.1 K/UL (ref 3.5–11)
WBC # BLD: ABNORMAL 10*3/UL

## 2018-12-12 PROCEDURE — 96374 THER/PROPH/DIAG INJ IV PUSH: CPT

## 2018-12-12 PROCEDURE — 2060000000 HC ICU INTERMEDIATE R&B

## 2018-12-12 PROCEDURE — 99211 OFF/OP EST MAY X REQ PHY/QHP: CPT

## 2018-12-12 PROCEDURE — 87086 URINE CULTURE/COLONY COUNT: CPT

## 2018-12-12 PROCEDURE — 93005 ELECTROCARDIOGRAM TRACING: CPT

## 2018-12-12 PROCEDURE — 71045 X-RAY EXAM CHEST 1 VIEW: CPT

## 2018-12-12 PROCEDURE — 80048 BASIC METABOLIC PNL TOTAL CA: CPT

## 2018-12-12 PROCEDURE — 2500000003 HC RX 250 WO HCPCS: Performed by: EMERGENCY MEDICINE

## 2018-12-12 PROCEDURE — 84484 ASSAY OF TROPONIN QUANT: CPT

## 2018-12-12 PROCEDURE — 6370000000 HC RX 637 (ALT 250 FOR IP): Performed by: EMERGENCY MEDICINE

## 2018-12-12 PROCEDURE — 83874 ASSAY OF MYOGLOBIN: CPT

## 2018-12-12 PROCEDURE — 85379 FIBRIN DEGRADATION QUANT: CPT

## 2018-12-12 PROCEDURE — 99285 EMERGENCY DEPT VISIT HI MDM: CPT

## 2018-12-12 PROCEDURE — 85025 COMPLETE CBC W/AUTO DIFF WBC: CPT

## 2018-12-12 PROCEDURE — 82947 ASSAY GLUCOSE BLOOD QUANT: CPT

## 2018-12-12 PROCEDURE — 83880 ASSAY OF NATRIURETIC PEPTIDE: CPT

## 2018-12-12 RX ORDER — ASPIRIN 81 MG/1
81 TABLET ORAL DAILY
Status: DISCONTINUED | OUTPATIENT
Start: 2018-12-13 | End: 2018-12-20 | Stop reason: HOSPADM

## 2018-12-12 RX ORDER — DULOXETIN HYDROCHLORIDE 30 MG/1
30 CAPSULE, DELAYED RELEASE ORAL DAILY
Status: DISCONTINUED | OUTPATIENT
Start: 2018-12-13 | End: 2018-12-20 | Stop reason: HOSPADM

## 2018-12-12 RX ORDER — BUMETANIDE 0.25 MG/ML
2 INJECTION, SOLUTION INTRAMUSCULAR; INTRAVENOUS ONCE
Status: COMPLETED | OUTPATIENT
Start: 2018-12-12 | End: 2018-12-12

## 2018-12-12 RX ORDER — MORPHINE SULFATE 4 MG/ML
4 INJECTION, SOLUTION INTRAMUSCULAR; INTRAVENOUS ONCE
Status: DISCONTINUED | OUTPATIENT
Start: 2018-12-12 | End: 2018-12-12

## 2018-12-12 RX ORDER — HYDROCODONE BITARTRATE AND ACETAMINOPHEN 5; 325 MG/1; MG/1
1 TABLET ORAL EVERY 6 HOURS PRN
Status: DISCONTINUED | OUTPATIENT
Start: 2018-12-12 | End: 2018-12-20 | Stop reason: HOSPADM

## 2018-12-12 RX ORDER — LANOLIN ALCOHOL/MO/W.PET/CERES
325 CREAM (GRAM) TOPICAL
Status: DISCONTINUED | OUTPATIENT
Start: 2018-12-13 | End: 2018-12-20 | Stop reason: HOSPADM

## 2018-12-12 RX ORDER — CARVEDILOL 25 MG/1
25 TABLET ORAL 2 TIMES DAILY WITH MEALS
Status: DISCONTINUED | OUTPATIENT
Start: 2018-12-13 | End: 2018-12-13

## 2018-12-12 RX ORDER — BUMETANIDE 0.25 MG/ML
1 INJECTION, SOLUTION INTRAMUSCULAR; INTRAVENOUS 2 TIMES DAILY
Status: DISCONTINUED | OUTPATIENT
Start: 2018-12-12 | End: 2018-12-15

## 2018-12-12 RX ORDER — HYDROCODONE BITARTRATE AND ACETAMINOPHEN 5; 325 MG/1; MG/1
1 TABLET ORAL ONCE
Status: COMPLETED | OUTPATIENT
Start: 2018-12-12 | End: 2018-12-12

## 2018-12-12 RX ORDER — INSULIN GLARGINE 100 [IU]/ML
30 INJECTION, SOLUTION SUBCUTANEOUS NIGHTLY
Status: DISCONTINUED | OUTPATIENT
Start: 2018-12-12 | End: 2018-12-18

## 2018-12-12 RX ORDER — ISOSORBIDE MONONITRATE 30 MG/1
30 TABLET, EXTENDED RELEASE ORAL DAILY
Status: DISCONTINUED | OUTPATIENT
Start: 2018-12-13 | End: 2018-12-20 | Stop reason: HOSPADM

## 2018-12-12 RX ORDER — GABAPENTIN 100 MG/1
100 CAPSULE ORAL 3 TIMES DAILY
Status: DISCONTINUED | OUTPATIENT
Start: 2018-12-12 | End: 2018-12-20 | Stop reason: HOSPADM

## 2018-12-12 RX ORDER — ATORVASTATIN CALCIUM 40 MG/1
40 TABLET, FILM COATED ORAL DAILY
Status: DISCONTINUED | OUTPATIENT
Start: 2018-12-13 | End: 2018-12-20 | Stop reason: HOSPADM

## 2018-12-12 RX ORDER — IPRATROPIUM BROMIDE AND ALBUTEROL SULFATE 2.5; .5 MG/3ML; MG/3ML
1 SOLUTION RESPIRATORY (INHALATION) DAILY PRN
Status: DISCONTINUED | OUTPATIENT
Start: 2018-12-12 | End: 2018-12-20 | Stop reason: HOSPADM

## 2018-12-12 RX ADMIN — BUMETANIDE 2 MG: 0.25 INJECTION INTRAMUSCULAR; INTRAVENOUS at 21:58

## 2018-12-12 RX ADMIN — HYDROCODONE BITARTRATE AND ACETAMINOPHEN 1 TABLET: 5; 325 TABLET ORAL at 22:08

## 2018-12-12 ASSESSMENT — PAIN SCALES - GENERAL
PAINLEVEL_OUTOF10: 9
PAINLEVEL_OUTOF10: 7
PAINLEVEL_OUTOF10: 9

## 2018-12-12 ASSESSMENT — PAIN DESCRIPTION - PAIN TYPE
TYPE: ACUTE PAIN;CHRONIC PAIN
TYPE: ACUTE PAIN;CHRONIC PAIN

## 2018-12-12 ASSESSMENT — PAIN DESCRIPTION - ORIENTATION
ORIENTATION: RIGHT
ORIENTATION: LEFT;RIGHT

## 2018-12-12 ASSESSMENT — PAIN DESCRIPTION - LOCATION
LOCATION: LEG;FOOT
LOCATION: LEG

## 2018-12-13 LAB
ABSOLUTE EOS #: 0.2 K/UL (ref 0–0.4)
ABSOLUTE IMMATURE GRANULOCYTE: ABNORMAL K/UL (ref 0–0.3)
ABSOLUTE LYMPH #: 1.6 K/UL (ref 1–4.8)
ABSOLUTE MONO #: 0.5 K/UL (ref 0.2–0.8)
ANION GAP SERPL CALCULATED.3IONS-SCNC: 11 MMOL/L (ref 9–17)
BASOPHILS # BLD: 1 % (ref 0–2)
BASOPHILS ABSOLUTE: 0 K/UL (ref 0–0.2)
BUN BLDV-MCNC: 29 MG/DL (ref 8–23)
BUN/CREAT BLD: 13 (ref 9–20)
CALCIUM SERPL-MCNC: 9 MG/DL (ref 8.6–10.4)
CHLORIDE BLD-SCNC: 105 MMOL/L (ref 98–107)
CO2: 27 MMOL/L (ref 20–31)
CREAT SERPL-MCNC: 2.17 MG/DL (ref 0.5–0.9)
DIFFERENTIAL TYPE: ABNORMAL
EOSINOPHILS RELATIVE PERCENT: 3 % (ref 1–4)
ESTIMATED AVERAGE GLUCOSE: 194 MG/DL
GFR AFRICAN AMERICAN: 27 ML/MIN
GFR NON-AFRICAN AMERICAN: 22 ML/MIN
GFR SERPL CREATININE-BSD FRML MDRD: ABNORMAL ML/MIN/{1.73_M2}
GFR SERPL CREATININE-BSD FRML MDRD: ABNORMAL ML/MIN/{1.73_M2}
GLUCOSE BLD-MCNC: 110 MG/DL (ref 70–99)
GLUCOSE BLD-MCNC: 126 MG/DL (ref 65–105)
GLUCOSE BLD-MCNC: 128 MG/DL (ref 65–105)
GLUCOSE BLD-MCNC: 137 MG/DL (ref 65–105)
GLUCOSE BLD-MCNC: 71 MG/DL (ref 65–105)
HBA1C MFR BLD: 8.4 % (ref 4–6)
HCT VFR BLD CALC: 26.9 % (ref 36–46)
HEMOGLOBIN: 8.9 G/DL (ref 12–16)
IMMATURE GRANULOCYTES: ABNORMAL %
LYMPHOCYTES # BLD: 25 % (ref 24–44)
MCH RBC QN AUTO: 28.6 PG (ref 26–34)
MCHC RBC AUTO-ENTMCNC: 33 G/DL (ref 31–37)
MCV RBC AUTO: 86.4 FL (ref 80–100)
MONOCYTES # BLD: 9 % (ref 1–7)
NRBC AUTOMATED: ABNORMAL PER 100 WBC
PDW BLD-RTO: 14.5 % (ref 11.5–14.5)
PLATELET # BLD: 199 K/UL (ref 130–400)
PLATELET ESTIMATE: ABNORMAL
PMV BLD AUTO: 7.7 FL (ref 6–12)
POTASSIUM SERPL-SCNC: 4.1 MMOL/L (ref 3.7–5.3)
RBC # BLD: 3.11 M/UL (ref 4–5.2)
RBC # BLD: ABNORMAL 10*6/UL
SEG NEUTROPHILS: 62 % (ref 36–66)
SEGMENTED NEUTROPHILS ABSOLUTE COUNT: 3.8 K/UL (ref 1.8–7.7)
SODIUM BLD-SCNC: 143 MMOL/L (ref 135–144)
TROPONIN INTERP: ABNORMAL
TROPONIN T: 0.05 NG/ML
TROPONIN T: 0.06 NG/ML
WBC # BLD: 6.2 K/UL (ref 3.5–11)
WBC # BLD: ABNORMAL 10*3/UL

## 2018-12-13 PROCEDURE — 6360000002 HC RX W HCPCS: Performed by: INTERNAL MEDICINE

## 2018-12-13 PROCEDURE — 2060000000 HC ICU INTERMEDIATE R&B

## 2018-12-13 PROCEDURE — 80048 BASIC METABOLIC PNL TOTAL CA: CPT

## 2018-12-13 PROCEDURE — 85025 COMPLETE CBC W/AUTO DIFF WBC: CPT

## 2018-12-13 PROCEDURE — 83036 HEMOGLOBIN GLYCOSYLATED A1C: CPT

## 2018-12-13 PROCEDURE — 2500000003 HC RX 250 WO HCPCS: Performed by: INTERNAL MEDICINE

## 2018-12-13 PROCEDURE — 90686 IIV4 VACC NO PRSV 0.5 ML IM: CPT | Performed by: INTERNAL MEDICINE

## 2018-12-13 PROCEDURE — 84484 ASSAY OF TROPONIN QUANT: CPT

## 2018-12-13 PROCEDURE — 6370000000 HC RX 637 (ALT 250 FOR IP): Performed by: INTERNAL MEDICINE

## 2018-12-13 PROCEDURE — 36415 COLL VENOUS BLD VENIPUNCTURE: CPT

## 2018-12-13 PROCEDURE — 82947 ASSAY GLUCOSE BLOOD QUANT: CPT

## 2018-12-13 PROCEDURE — G0008 ADMIN INFLUENZA VIRUS VAC: HCPCS | Performed by: INTERNAL MEDICINE

## 2018-12-13 RX ORDER — DEXTROSE MONOHYDRATE 50 MG/ML
100 INJECTION, SOLUTION INTRAVENOUS PRN
Status: DISCONTINUED | OUTPATIENT
Start: 2018-12-13 | End: 2018-12-20 | Stop reason: HOSPADM

## 2018-12-13 RX ORDER — HYDRALAZINE HYDROCHLORIDE 100 MG/1
100 TABLET, FILM COATED ORAL 2 TIMES DAILY
Status: ON HOLD | COMMUNITY
End: 2018-12-20 | Stop reason: HOSPADM

## 2018-12-13 RX ORDER — NICOTINE POLACRILEX 4 MG
15 LOZENGE BUCCAL PRN
Status: DISCONTINUED | OUTPATIENT
Start: 2018-12-13 | End: 2018-12-20 | Stop reason: HOSPADM

## 2018-12-13 RX ORDER — CARVEDILOL 25 MG/1
25 TABLET ORAL 2 TIMES DAILY WITH MEALS
Status: DISCONTINUED | OUTPATIENT
Start: 2018-12-13 | End: 2018-12-20 | Stop reason: HOSPADM

## 2018-12-13 RX ORDER — DEXTROSE MONOHYDRATE 25 G/50ML
12.5 INJECTION, SOLUTION INTRAVENOUS PRN
Status: DISCONTINUED | OUTPATIENT
Start: 2018-12-13 | End: 2018-12-20 | Stop reason: HOSPADM

## 2018-12-13 RX ORDER — GENTAMICIN SULFATE 1 MG/G
OINTMENT TOPICAL DAILY
Status: ON HOLD | COMMUNITY
End: 2019-10-26

## 2018-12-13 RX ADMIN — INSULIN LISPRO 1 UNITS: 100 INJECTION, SOLUTION INTRAVENOUS; SUBCUTANEOUS at 00:38

## 2018-12-13 RX ADMIN — DULOXETINE HYDROCHLORIDE 30 MG: 30 CAPSULE, DELAYED RELEASE ORAL at 08:31

## 2018-12-13 RX ADMIN — DARBEPOETIN ALFA 40 MCG: 40 INJECTION, SOLUTION INTRAVENOUS; SUBCUTANEOUS at 11:25

## 2018-12-13 RX ADMIN — ATORVASTATIN CALCIUM 40 MG: 40 TABLET, FILM COATED ORAL at 08:31

## 2018-12-13 RX ADMIN — INSULIN GLARGINE 30 UNITS: 100 INJECTION, SOLUTION SUBCUTANEOUS at 00:38

## 2018-12-13 RX ADMIN — INFLUENZA A VIRUS A/MICHIGAN/45/2015 X-275 (H1N1) ANTIGEN (FORMALDEHYDE INACTIVATED), INFLUENZA A VIRUS A/SINGAPORE/INFIMH-16-0019/2016 IVR-186 (H3N2) ANTIGEN (FORMALDEHYDE INACTIVATED), INFLUENZA B VIRUS B/PHUKET/3073/2013 ANTIGEN (FORMALDEHYDE INACTIVATED), AND INFLUENZA B VIRUS B/MARYLAND/15/2016 BX-69A ANTIGEN (FORMALDEHYDE INACTIVATED) 0.5 ML: 15; 15; 15; 15 INJECTION, SUSPENSION INTRAMUSCULAR at 11:24

## 2018-12-13 RX ADMIN — BUMETANIDE 1 MG: 0.25 INJECTION INTRAMUSCULAR; INTRAVENOUS at 00:37

## 2018-12-13 RX ADMIN — GABAPENTIN 100 MG: 100 CAPSULE ORAL at 00:37

## 2018-12-13 RX ADMIN — ISOSORBIDE MONONITRATE 30 MG: 30 TABLET ORAL at 08:31

## 2018-12-13 RX ADMIN — FERROUS SULFATE TAB EC 325 MG (65 MG FE EQUIVALENT) 325 MG: 325 (65 FE) TABLET DELAYED RESPONSE at 08:31

## 2018-12-13 RX ADMIN — CARVEDILOL 25 MG: 25 TABLET, FILM COATED ORAL at 08:31

## 2018-12-13 RX ADMIN — GABAPENTIN 100 MG: 100 CAPSULE ORAL at 21:28

## 2018-12-13 RX ADMIN — CARVEDILOL 25 MG: 25 TABLET, FILM COATED ORAL at 00:58

## 2018-12-13 RX ADMIN — BUMETANIDE 1 MG: 0.25 INJECTION INTRAMUSCULAR; INTRAVENOUS at 21:28

## 2018-12-13 RX ADMIN — HYDROCODONE BITARTRATE AND ACETAMINOPHEN 1 TABLET: 5; 325 TABLET ORAL at 21:30

## 2018-12-13 RX ADMIN — GABAPENTIN 100 MG: 100 CAPSULE ORAL at 08:31

## 2018-12-13 RX ADMIN — ASPIRIN 81 MG: 81 TABLET, COATED ORAL at 08:31

## 2018-12-13 RX ADMIN — GABAPENTIN 100 MG: 100 CAPSULE ORAL at 14:42

## 2018-12-13 RX ADMIN — BUMETANIDE 1 MG: 0.25 INJECTION INTRAMUSCULAR; INTRAVENOUS at 08:31

## 2018-12-13 RX ADMIN — INSULIN GLARGINE 30 UNITS: 100 INJECTION, SOLUTION SUBCUTANEOUS at 21:28

## 2018-12-13 RX ADMIN — CARVEDILOL 25 MG: 25 TABLET, FILM COATED ORAL at 17:25

## 2018-12-13 ASSESSMENT — ENCOUNTER SYMPTOMS
VOMITING: 0
SINUS PRESSURE: 0
SHORTNESS OF BREATH: 0
DIARRHEA: 0
NAUSEA: 0
COLOR CHANGE: 0
ABDOMINAL PAIN: 0
RHINORRHEA: 0
SORE THROAT: 0
CONSTIPATION: 0
WHEEZING: 0
COUGH: 0

## 2018-12-13 ASSESSMENT — PAIN SCALES - GENERAL
PAINLEVEL_OUTOF10: 0
PAINLEVEL_OUTOF10: 4
PAINLEVEL_OUTOF10: 7

## 2018-12-13 NOTE — H&P
Topics Concern    Not on file     Social History Narrative    No narrative on file       Family History:   None in record. REVIEW OF SYSTEMS:  CONSTITUTIONAL:  negative for  fevers and chills  EYES:  negative for  double vision, blurred vision, dry eyes and blind spots  HEENT:  negative for  hearing loss, tinnitus, ear drainage, earaches, nasal congestion and epistaxis  RESPIRATORY:  negative for  dry cough, wheezing, hemoptysis and pleuritic pain. Has Obstructive Sleep Apnea but is non-compliant with CPAP. CARDIOVASCULAR:  Present illness. GASTROINTESTINAL:  negative for vomiting, change in bowel habits, dysphagia, hematemesis and hemtochezia  GENITOURINARY:  negative for hematuria  INTEGUMENT/BREAST:  negative for dryness and pruritus  HEMATOLOGIC/LYMPHATIC:  negative for easy bruising and bleeding  ALLERGIC/IMMUNOLOGIC:  See list.  ENDOCRINE:  Diabetic. MUSCULOSKELETAL:  positive for  arthralgias  NEUROLOGICAL:  negative for seizures, memory problems, speech problems and syncope  BEHAVIOR/PSYCH:  negative for depressed mood    PHYSICAL EXAM:  Vitals:  BP (!) 132/47   Pulse 71   Temp 97.6 °F (36.4 °C) (Oral)   Resp 17   Ht 5' 5\" (1.651 m)   Wt 263 lb 3.2 oz (119.4 kg)   SpO2 96%   BMI 43.80 kg/m²     General Appearance: alert and oriented to person, place and time and in no acute distress  Skin: warm and dry  Head: normocephalic and atraumatic  Eyes: pupils equal, round, and reactive to light, conjunctivae normal and sclera anicteric  ENT: oropharynx clear and moist with normal mucous membranes  Neck: neck supple and non tender without mass, no thyromegaly, no thyroid nodules and no cervical adenopathy   Pulmonary/Chest: no chest wall tenderness. Diminished breath sounds both lung fields. No wheezing.   Cardiovascular: normal rate, normal S1 and S2, no gallops, intact distal pulses and no carotid bruits  Abdomen: obese but is soft, non-tender, non-distended and bowel sounds normal  Genitourinary:

## 2018-12-13 NOTE — ED PROVIDER NOTES
daily       insulin glargine (LANTUS) 100 UNIT/ML injection pen Inject 30 Units into the skin nightly       isosorbide mononitrate (IMDUR) 30 MG extended release tablet Take 30 mg by mouth daily      DULoxetine (CYMBALTA) 30 MG extended release capsule Take 30 mg by mouth daily      atorvastatin (LIPITOR) 40 MG tablet Take 40 mg by mouth daily      ferrous sulfate (FE TABS) 325 (65 FE) MG EC tablet Take 1 tablet by mouth daily (with breakfast)  Qty: 90 tablet, Refills: 3      aspirin 81 MG tablet Take 81 mg by mouth daily. PAST MEDICAL HISTORY         Diagnosis Date    Arthritis     right knee    Cerebral artery occlusion with cerebral infarction ([facility] 02/2017    weakness in left hand    [facility]congest[facility] failure) [facility]     CKD (chronic kidney disease) stage 3, GFR 30-59 ml/min ([facility]     COPD (chronic obstructive pulmonary disease) [facility]     Depression (emotion) 2/13/2016    Diabetes mellitus ([facility]     [facility] 10/23/2014    DM type 2 (diabetes mellitus, type 2) ([facility]     Edema     Hx MRSA infection resolved 8/2017    2 negative nasal screens - 8/2017 (hx in blood & CSF - 2016)    Hyperlipidemia     Hypertension     LAD stenosis 10/28/2014    Pneumonia many years ago    Sleep apnea     no machine    SOB (shortness of breath)     Type 2 diabetes mellitus with proteinuria or albuminuria 10/23/2014       SURGICAL HISTORY           Procedure Laterality Date    AORTIC VALVE REPLACEMENT  july 2015    CARDIAC CATHETERIZATION  2014    TONSILLECTOMY           FAMILY HISTORY       Family History   Problem Relation Age of Onset    Adopted: Yes    Other Mother         adopted     Family Status   Relation Status    Mother (Not Specified)        SOCIAL HISTORY      reports that she quit smoking about 9 years ago. Her smoking use included Cigarettes. She has a 47.00 pack-year smoking history.  She has never used smokeless tobacco. She reports that she does not drink alcohol

## 2018-12-13 NOTE — CONSULTS
Marital status:      Spouse name: N/A    Number of children: 0    Years of education: 12     Occupational History    Not on file. Social History Main Topics    Smoking status: Former Smoker     Packs/day: 1.00     Years: 47.00     Types: Cigarettes     Quit date: 2009    Smokeless tobacco: Never Used      Comment: Quit smoking 6 years ago/ Started smoking at 25    Alcohol use No      Comment: 2 beers once a month    Drug use: No    Sexual activity: Yes     Partners: Male     Other Topics Concern    Not on file     Social History Narrative    No narrative on file       Family History:   Family History   Problem Relation Age of Onset    Adopted: Yes    Other Mother         adopted       Review of Systems:    Constitutional: No fever, no chills, no night sweats, fatigue, generalized weakness, loss of appetite  HEENT:  No headache, otalgia, itchy eyes, epistaxis, nasal discharge or sore throat. Cardiac:  No chest pain, Positive dyspnea, orthopnea or PND, palpitations  Chest:  No cough, hemoptysis, pleuritic chest pain, wheezing,SOB  Abdomen:  No abdominal pain, nausea, vomiting, diarrhea, melena, dysphagia hematemesis,constipation, abdominal bloating, flank pain  Neuro:  No CVA, TIA or seizure like activity. Skin:   No rashes, no itching. :   No hematuria, no pyuria, no dysuria, no flank pain. Extremities:  No swelling or joint pains.       Objective:  CURRENT TEMPERATURE:  Temp: 97.7 °F (36.5 °C)  MAXIMUM TEMPERATURE OVER 24HRS:  Temp (24hrs), Av.9 °F (36.6 °C), Min:97.6 °F (36.4 °C), Max:98.3 °F (36.8 °C)    CURRENT RESPIRATORY RATE:  Resp: 16  CURRENT PULSE:  Pulse: 64  CURRENT BLOOD PRESSURE:  BP: (!) 166/66  24HR BLOOD PRESSURE RANGE:  Systolic (62KGJ), DLX:667 , Min:115 , YQW:834   ; Diastolic (24XKD), BNP:32, Min:47, Max:70    24HR INTAKE/OUTPUT:  No intake or output data in the 24 hours ending 18 0915  Patient Vitals for the past 96 hrs (Last 3 readings):   Weight

## 2018-12-14 LAB
ANION GAP SERPL CALCULATED.3IONS-SCNC: 10 MMOL/L (ref 9–17)
BUN BLDV-MCNC: 32 MG/DL (ref 8–23)
BUN/CREAT BLD: 15 (ref 9–20)
CALCIUM SERPL-MCNC: 8.8 MG/DL (ref 8.6–10.4)
CHLORIDE BLD-SCNC: 106 MMOL/L (ref 98–107)
CO2: 27 MMOL/L (ref 20–31)
CREAT SERPL-MCNC: 2.17 MG/DL (ref 0.5–0.9)
GFR AFRICAN AMERICAN: 27 ML/MIN
GFR NON-AFRICAN AMERICAN: 22 ML/MIN
GFR SERPL CREATININE-BSD FRML MDRD: ABNORMAL ML/MIN/{1.73_M2}
GFR SERPL CREATININE-BSD FRML MDRD: ABNORMAL ML/MIN/{1.73_M2}
GLUCOSE BLD-MCNC: 159 MG/DL (ref 65–105)
GLUCOSE BLD-MCNC: 303 MG/DL (ref 65–105)
GLUCOSE BLD-MCNC: 72 MG/DL (ref 65–105)
GLUCOSE BLD-MCNC: 92 MG/DL (ref 65–105)
GLUCOSE BLD-MCNC: 96 MG/DL (ref 70–99)
POTASSIUM SERPL-SCNC: 4.3 MMOL/L (ref 3.7–5.3)
SODIUM BLD-SCNC: 143 MMOL/L (ref 135–144)
TROPONIN INTERP: ABNORMAL
TROPONIN T: 0.04 NG/ML
TROPONIN T: 0.04 NG/ML
TROPONIN T: 0.05 NG/ML
TROPONIN T: 0.05 NG/ML

## 2018-12-14 PROCEDURE — 82947 ASSAY GLUCOSE BLOOD QUANT: CPT

## 2018-12-14 PROCEDURE — 80048 BASIC METABOLIC PNL TOTAL CA: CPT

## 2018-12-14 PROCEDURE — 2500000003 HC RX 250 WO HCPCS: Performed by: INTERNAL MEDICINE

## 2018-12-14 PROCEDURE — 6370000000 HC RX 637 (ALT 250 FOR IP): Performed by: INTERNAL MEDICINE

## 2018-12-14 PROCEDURE — 6370000000 HC RX 637 (ALT 250 FOR IP): Performed by: NURSE PRACTITIONER

## 2018-12-14 PROCEDURE — 84484 ASSAY OF TROPONIN QUANT: CPT

## 2018-12-14 PROCEDURE — 2060000000 HC ICU INTERMEDIATE R&B

## 2018-12-14 PROCEDURE — 36415 COLL VENOUS BLD VENIPUNCTURE: CPT

## 2018-12-14 RX ORDER — BUMETANIDE 0.25 MG/ML
1 INJECTION, SOLUTION INTRAMUSCULAR; INTRAVENOUS ONCE
Status: DISCONTINUED | OUTPATIENT
Start: 2018-12-14 | End: 2018-12-18

## 2018-12-14 RX ORDER — MINERAL OIL/PETROLATUM,WHITE
CREAM (GRAM) TOPICAL DAILY
Status: DISCONTINUED | OUTPATIENT
Start: 2018-12-14 | End: 2018-12-20 | Stop reason: HOSPADM

## 2018-12-14 RX ADMIN — DULOXETINE HYDROCHLORIDE 30 MG: 30 CAPSULE, DELAYED RELEASE ORAL at 08:42

## 2018-12-14 RX ADMIN — ATORVASTATIN CALCIUM 40 MG: 40 TABLET, FILM COATED ORAL at 08:42

## 2018-12-14 RX ADMIN — INSULIN GLARGINE 30 UNITS: 100 INJECTION, SOLUTION SUBCUTANEOUS at 21:40

## 2018-12-14 RX ADMIN — GABAPENTIN 100 MG: 100 CAPSULE ORAL at 08:42

## 2018-12-14 RX ADMIN — HYDROCODONE BITARTRATE AND ACETAMINOPHEN 1 TABLET: 5; 325 TABLET ORAL at 21:54

## 2018-12-14 RX ADMIN — CARVEDILOL 25 MG: 25 TABLET, FILM COATED ORAL at 17:32

## 2018-12-14 RX ADMIN — ASPIRIN 81 MG: 81 TABLET, COATED ORAL at 08:42

## 2018-12-14 RX ADMIN — BUMETANIDE 1 MG: 0.25 INJECTION INTRAMUSCULAR; INTRAVENOUS at 18:48

## 2018-12-14 RX ADMIN — SKIN PROTECTANT: 33 OINTMENT TOPICAL at 17:34

## 2018-12-14 RX ADMIN — CARVEDILOL 25 MG: 25 TABLET, FILM COATED ORAL at 08:42

## 2018-12-14 RX ADMIN — INSULIN LISPRO 8 UNITS: 100 INJECTION, SOLUTION INTRAVENOUS; SUBCUTANEOUS at 17:32

## 2018-12-14 RX ADMIN — INSULIN LISPRO 2 UNITS: 100 INJECTION, SOLUTION INTRAVENOUS; SUBCUTANEOUS at 12:21

## 2018-12-14 RX ADMIN — FERROUS SULFATE TAB EC 325 MG (65 MG FE EQUIVALENT) 325 MG: 325 (65 FE) TABLET DELAYED RESPONSE at 08:42

## 2018-12-14 RX ADMIN — BUMETANIDE 1 MG: 0.25 INJECTION INTRAMUSCULAR; INTRAVENOUS at 08:41

## 2018-12-14 RX ADMIN — ISOSORBIDE MONONITRATE 30 MG: 30 TABLET ORAL at 08:42

## 2018-12-14 RX ADMIN — GABAPENTIN 100 MG: 100 CAPSULE ORAL at 12:21

## 2018-12-14 RX ADMIN — GABAPENTIN 100 MG: 100 CAPSULE ORAL at 21:40

## 2018-12-14 ASSESSMENT — PAIN SCALES - GENERAL
PAINLEVEL_OUTOF10: 8
PAINLEVEL_OUTOF10: 4

## 2018-12-14 NOTE — PROGRESS NOTES
Dyspnea better compared to baseline. Leg edema appears less. No new complaints. Hemodynamically stable. Continuing current regimen.

## 2018-12-15 ENCOUNTER — APPOINTMENT (OUTPATIENT)
Dept: GENERAL RADIOLOGY | Age: 73
DRG: 291 | End: 2018-12-15
Payer: MEDICARE

## 2018-12-15 ENCOUNTER — APPOINTMENT (OUTPATIENT)
Dept: CT IMAGING | Age: 73
DRG: 291 | End: 2018-12-15
Payer: MEDICARE

## 2018-12-15 LAB
ABSOLUTE EOS #: 0.2 K/UL (ref 0–0.4)
ABSOLUTE EOS #: 0.3 K/UL (ref 0–0.4)
ABSOLUTE IMMATURE GRANULOCYTE: ABNORMAL K/UL (ref 0–0.3)
ABSOLUTE IMMATURE GRANULOCYTE: ABNORMAL K/UL (ref 0–0.3)
ABSOLUTE LYMPH #: 1.8 K/UL (ref 1–4.8)
ABSOLUTE LYMPH #: 2.4 K/UL (ref 1–4.8)
ABSOLUTE MONO #: 0.5 K/UL (ref 0.2–0.8)
ABSOLUTE MONO #: 0.5 K/UL (ref 0.2–0.8)
ANION GAP SERPL CALCULATED.3IONS-SCNC: 11 MMOL/L (ref 9–17)
ANION GAP SERPL CALCULATED.3IONS-SCNC: 18 MMOL/L (ref 9–17)
BASOPHILS # BLD: 0 % (ref 0–2)
BASOPHILS # BLD: 1 % (ref 0–2)
BASOPHILS ABSOLUTE: 0 K/UL (ref 0–0.2)
BASOPHILS ABSOLUTE: 0 K/UL (ref 0–0.2)
BILIRUBIN URINE: NEGATIVE
BUN BLDV-MCNC: 34 MG/DL (ref 8–23)
BUN BLDV-MCNC: 34 MG/DL (ref 8–23)
BUN/CREAT BLD: 15 (ref 9–20)
BUN/CREAT BLD: 17 (ref 9–20)
CALCIUM SERPL-MCNC: 9.1 MG/DL (ref 8.6–10.4)
CALCIUM SERPL-MCNC: 9.2 MG/DL (ref 8.6–10.4)
CHLORIDE BLD-SCNC: 102 MMOL/L (ref 98–107)
CHLORIDE BLD-SCNC: 104 MMOL/L (ref 98–107)
CO2: 24 MMOL/L (ref 20–31)
CO2: 26 MMOL/L (ref 20–31)
COLOR: YELLOW
COMMENT UA: NORMAL
CREAT SERPL-MCNC: 1.97 MG/DL (ref 0.5–0.9)
CREAT SERPL-MCNC: 2.23 MG/DL (ref 0.5–0.9)
CULTURE: NORMAL
D-DIMER QUANTITATIVE: 3.67 MG/L FEU
DIFFERENTIAL TYPE: ABNORMAL
DIFFERENTIAL TYPE: ABNORMAL
EOSINOPHILS RELATIVE PERCENT: 3 % (ref 1–4)
EOSINOPHILS RELATIVE PERCENT: 5 % (ref 1–4)
FERRITIN: 335 UG/L (ref 13–150)
FIO2: 50
GFR AFRICAN AMERICAN: 26 ML/MIN
GFR AFRICAN AMERICAN: 30 ML/MIN
GFR NON-AFRICAN AMERICAN: 22 ML/MIN
GFR NON-AFRICAN AMERICAN: 25 ML/MIN
GFR SERPL CREATININE-BSD FRML MDRD: ABNORMAL ML/MIN/{1.73_M2}
GLUCOSE BLD-MCNC: 130 MG/DL (ref 65–105)
GLUCOSE BLD-MCNC: 132 MG/DL (ref 70–99)
GLUCOSE BLD-MCNC: 135 MG/DL (ref 65–105)
GLUCOSE BLD-MCNC: 146 MG/DL (ref 65–105)
GLUCOSE BLD-MCNC: 147 MG/DL (ref 65–105)
GLUCOSE BLD-MCNC: 78 MG/DL (ref 65–105)
GLUCOSE BLD-MCNC: 87 MG/DL (ref 70–99)
GLUCOSE URINE: NEGATIVE
HCT VFR BLD CALC: 27.6 % (ref 36–46)
HCT VFR BLD CALC: 27.7 % (ref 36–46)
HEMOGLOBIN: 9.2 G/DL (ref 12–16)
HEMOGLOBIN: 9.2 G/DL (ref 12–16)
IMMATURE GRANULOCYTES: ABNORMAL %
IMMATURE GRANULOCYTES: ABNORMAL %
INR BLD: 1.1
IRON SATURATION: 18 % (ref 20–55)
IRON: 33 UG/DL (ref 37–145)
KETONES, URINE: NEGATIVE
LEUKOCYTE ESTERASE, URINE: NEGATIVE
LYMPHOCYTES # BLD: 29 % (ref 24–44)
LYMPHOCYTES # BLD: 32 % (ref 24–44)
Lab: NORMAL
MCH RBC QN AUTO: 28.8 PG (ref 26–34)
MCH RBC QN AUTO: 29 PG (ref 26–34)
MCHC RBC AUTO-ENTMCNC: 33.3 G/DL (ref 31–37)
MCHC RBC AUTO-ENTMCNC: 33.4 G/DL (ref 31–37)
MCV RBC AUTO: 86.4 FL (ref 80–100)
MCV RBC AUTO: 87.2 FL (ref 80–100)
MONOCYTES # BLD: 6 % (ref 1–7)
MONOCYTES # BLD: 8 % (ref 1–7)
MYOGLOBIN: 126 NG/ML (ref 25–58)
NEGATIVE BASE EXCESS, ART: 1 (ref 0–2)
NITRITE, URINE: NEGATIVE
NRBC AUTOMATED: ABNORMAL PER 100 WBC
NRBC AUTOMATED: ABNORMAL PER 100 WBC
O2 DEVICE/FLOW/%: ABNORMAL
PARTIAL THROMBOPLASTIN TIME: 26.6 SEC (ref 23–31)
PATIENT TEMP: 37
PDW BLD-RTO: 14.3 % (ref 11.5–14.5)
PDW BLD-RTO: 14.6 % (ref 11.5–14.5)
PH UA: 5.5 (ref 5–8)
PLATELET # BLD: 203 K/UL (ref 130–400)
PLATELET # BLD: 211 K/UL (ref 130–400)
PLATELET ESTIMATE: ABNORMAL
PLATELET ESTIMATE: ABNORMAL
PMV BLD AUTO: 7.3 FL (ref 6–12)
PMV BLD AUTO: 8.2 FL (ref 6–12)
POC HCO3: 25.4 MMOL/L (ref 22–27)
POC O2 SATURATION: 99 %
POC PCO2 TEMP: ABNORMAL MM HG
POC PCO2: 49 MM HG (ref 32–45)
POC PH TEMP: ABNORMAL
POC PH: 7.32 (ref 7.35–7.45)
POC PO2 TEMP: ABNORMAL MM HG
POC PO2: 172 MM HG (ref 75–95)
POSITIVE BASE EXCESS, ART: ABNORMAL (ref 0–2)
POTASSIUM SERPL-SCNC: 4 MMOL/L (ref 3.7–5.3)
POTASSIUM SERPL-SCNC: 4 MMOL/L (ref 3.7–5.3)
PROTEIN UA: NEGATIVE
PROTHROMBIN TIME: 11.4 SEC (ref 9.7–11.6)
RBC # BLD: 3.17 M/UL (ref 4–5.2)
RBC # BLD: 3.19 M/UL (ref 4–5.2)
RBC # BLD: ABNORMAL 10*6/UL
RBC # BLD: ABNORMAL 10*6/UL
SEG NEUTROPHILS: 57 % (ref 36–66)
SEG NEUTROPHILS: 59 % (ref 36–66)
SEGMENTED NEUTROPHILS ABSOLUTE COUNT: 3.5 K/UL (ref 1.8–7.7)
SEGMENTED NEUTROPHILS ABSOLUTE COUNT: 4.4 K/UL (ref 1.8–7.7)
SODIUM BLD-SCNC: 141 MMOL/L (ref 135–144)
SODIUM BLD-SCNC: 144 MMOL/L (ref 135–144)
SPECIFIC GRAVITY UA: 1.02 (ref 1–1.03)
SPECIMEN DESCRIPTION: NORMAL
STATUS: NORMAL
TCO2 (CALC), ART: 27 MMOL/L (ref 23–28)
TOTAL IRON BINDING CAPACITY: 187 UG/DL (ref 250–450)
TROPONIN INTERP: ABNORMAL
TROPONIN T: 0.03 NG/ML
TROPONIN T: 0.03 NG/ML
TROPONIN T: 0.04 NG/ML
TURBIDITY: CLEAR
UNSATURATED IRON BINDING CAPACITY: 154 UG/DL (ref 112–347)
URINE HGB: NEGATIVE
UROBILINOGEN, URINE: NORMAL
WBC # BLD: 6.1 K/UL (ref 3.5–11)
WBC # BLD: 7.6 K/UL (ref 3.5–11)
WBC # BLD: ABNORMAL 10*3/UL
WBC # BLD: ABNORMAL 10*3/UL

## 2018-12-15 PROCEDURE — 71045 X-RAY EXAM CHEST 1 VIEW: CPT

## 2018-12-15 PROCEDURE — 2500000003 HC RX 250 WO HCPCS: Performed by: INTERNAL MEDICINE

## 2018-12-15 PROCEDURE — 82728 ASSAY OF FERRITIN: CPT

## 2018-12-15 PROCEDURE — 6370000000 HC RX 637 (ALT 250 FOR IP): Performed by: INTERNAL MEDICINE

## 2018-12-15 PROCEDURE — 36600 WITHDRAWAL OF ARTERIAL BLOOD: CPT

## 2018-12-15 PROCEDURE — 2000000000 HC ICU R&B

## 2018-12-15 PROCEDURE — 84484 ASSAY OF TROPONIN QUANT: CPT

## 2018-12-15 PROCEDURE — 70450 CT HEAD/BRAIN W/O DYE: CPT

## 2018-12-15 PROCEDURE — 82803 BLOOD GASES ANY COMBINATION: CPT

## 2018-12-15 PROCEDURE — 83550 IRON BINDING TEST: CPT

## 2018-12-15 PROCEDURE — 85610 PROTHROMBIN TIME: CPT

## 2018-12-15 PROCEDURE — 94640 AIRWAY INHALATION TREATMENT: CPT

## 2018-12-15 PROCEDURE — 82947 ASSAY GLUCOSE BLOOD QUANT: CPT

## 2018-12-15 PROCEDURE — 81003 URINALYSIS AUTO W/O SCOPE: CPT

## 2018-12-15 PROCEDURE — 83874 ASSAY OF MYOGLOBIN: CPT

## 2018-12-15 PROCEDURE — 6360000002 HC RX W HCPCS: Performed by: INTERNAL MEDICINE

## 2018-12-15 PROCEDURE — 83540 ASSAY OF IRON: CPT

## 2018-12-15 PROCEDURE — 93005 ELECTROCARDIOGRAM TRACING: CPT

## 2018-12-15 PROCEDURE — 85025 COMPLETE CBC W/AUTO DIFF WBC: CPT

## 2018-12-15 PROCEDURE — 94664 DEMO&/EVAL PT USE INHALER: CPT

## 2018-12-15 PROCEDURE — 85730 THROMBOPLASTIN TIME PARTIAL: CPT

## 2018-12-15 PROCEDURE — 85379 FIBRIN DEGRADATION QUANT: CPT

## 2018-12-15 PROCEDURE — 36415 COLL VENOUS BLD VENIPUNCTURE: CPT

## 2018-12-15 PROCEDURE — 92950 HEART/LUNG RESUSCITATION CPR: CPT

## 2018-12-15 PROCEDURE — 80048 BASIC METABOLIC PNL TOTAL CA: CPT

## 2018-12-15 PROCEDURE — 6370000000 HC RX 637 (ALT 250 FOR IP): Performed by: NURSE PRACTITIONER

## 2018-12-15 PROCEDURE — 94660 CPAP INITIATION&MGMT: CPT

## 2018-12-15 RX ORDER — METHYLPREDNISOLONE SODIUM SUCCINATE 40 MG/ML
40 INJECTION, POWDER, LYOPHILIZED, FOR SOLUTION INTRAMUSCULAR; INTRAVENOUS EVERY 8 HOURS
Status: DISCONTINUED | OUTPATIENT
Start: 2018-12-15 | End: 2018-12-18

## 2018-12-15 RX ORDER — NALOXONE HYDROCHLORIDE 1 MG/ML
INJECTION INTRAMUSCULAR; INTRAVENOUS; SUBCUTANEOUS
Status: COMPLETED | OUTPATIENT
Start: 2018-12-15 | End: 2018-12-15

## 2018-12-15 RX ORDER — IPRATROPIUM BROMIDE AND ALBUTEROL SULFATE 2.5; .5 MG/3ML; MG/3ML
1 SOLUTION RESPIRATORY (INHALATION) EVERY 4 HOURS
Status: DISCONTINUED | OUTPATIENT
Start: 2018-12-15 | End: 2018-12-20 | Stop reason: HOSPADM

## 2018-12-15 RX ORDER — BUMETANIDE 0.25 MG/ML
2 INJECTION, SOLUTION INTRAMUSCULAR; INTRAVENOUS 2 TIMES DAILY
Status: DISCONTINUED | OUTPATIENT
Start: 2018-12-15 | End: 2018-12-16

## 2018-12-15 RX ORDER — PHENYTOIN SODIUM 100 MG/1
300 CAPSULE, EXTENDED RELEASE ORAL ONCE
Status: DISCONTINUED | OUTPATIENT
Start: 2018-12-15 | End: 2018-12-15

## 2018-12-15 RX ADMIN — ASPIRIN 81 MG: 81 TABLET, COATED ORAL at 09:33

## 2018-12-15 RX ADMIN — ATORVASTATIN CALCIUM 40 MG: 40 TABLET, FILM COATED ORAL at 09:33

## 2018-12-15 RX ADMIN — METHYLPREDNISOLONE SODIUM SUCCINATE 40 MG: 40 INJECTION, POWDER, FOR SOLUTION INTRAMUSCULAR; INTRAVENOUS at 17:54

## 2018-12-15 RX ADMIN — FERROUS SULFATE TAB EC 325 MG (65 MG FE EQUIVALENT) 325 MG: 325 (65 FE) TABLET DELAYED RESPONSE at 09:33

## 2018-12-15 RX ADMIN — CARVEDILOL 25 MG: 25 TABLET, FILM COATED ORAL at 09:33

## 2018-12-15 RX ADMIN — GABAPENTIN 100 MG: 100 CAPSULE ORAL at 13:21

## 2018-12-15 RX ADMIN — ISOSORBIDE MONONITRATE 30 MG: 30 TABLET ORAL at 09:33

## 2018-12-15 RX ADMIN — IPRATROPIUM BROMIDE AND ALBUTEROL SULFATE 1 AMPULE: .5; 3 SOLUTION RESPIRATORY (INHALATION) at 15:34

## 2018-12-15 RX ADMIN — INSULIN LISPRO 2 UNITS: 100 INJECTION, SOLUTION INTRAVENOUS; SUBCUTANEOUS at 12:03

## 2018-12-15 RX ADMIN — HYDROCODONE BITARTRATE AND ACETAMINOPHEN 1 TABLET: 5; 325 TABLET ORAL at 07:18

## 2018-12-15 RX ADMIN — SKIN PROTECTANT: 33 OINTMENT TOPICAL at 16:30

## 2018-12-15 RX ADMIN — BUMETANIDE 1 MG: 0.25 INJECTION INTRAMUSCULAR; INTRAVENOUS at 09:46

## 2018-12-15 RX ADMIN — IPRATROPIUM BROMIDE AND ALBUTEROL SULFATE 1 AMPULE: .5; 3 SOLUTION RESPIRATORY (INHALATION) at 20:15

## 2018-12-15 RX ADMIN — DULOXETINE HYDROCHLORIDE 30 MG: 30 CAPSULE, DELAYED RELEASE ORAL at 09:33

## 2018-12-15 RX ADMIN — BUMETANIDE 2 MG: 0.25 INJECTION INTRAMUSCULAR; INTRAVENOUS at 20:09

## 2018-12-15 RX ADMIN — GABAPENTIN 100 MG: 100 CAPSULE ORAL at 09:33

## 2018-12-15 RX ADMIN — NALOXONE HYDROCHLORIDE 2 MG: 1 INJECTION PARENTERAL at 14:21

## 2018-12-15 RX ADMIN — SKIN PROTECTANT: 33 OINTMENT TOPICAL at 04:56

## 2018-12-15 RX ADMIN — IPRATROPIUM BROMIDE AND ALBUTEROL SULFATE 1 AMPULE: .5; 3 SOLUTION RESPIRATORY (INHALATION) at 23:03

## 2018-12-15 ASSESSMENT — PAIN SCALES - GENERAL
PAINLEVEL_OUTOF10: 0
PAINLEVEL_OUTOF10: 0
PAINLEVEL_OUTOF10: 10
PAINLEVEL_OUTOF10: 0

## 2018-12-15 NOTE — CONSULTS
failure. She received diuretics and was to be discharged soon. I responded on her for code blue. She had agonal breeathing laying in bed unresponsive after she got out of the bathroom . I ordered Bag ventilation . BS checked at 123 . She had a pulse and BP > 160 . I  Ordered Narcan .  She responded and opened eyes and followed commands.          PMHx   Past Medical History      Diagnosis Date    Arthritis     right knee    Cerebral artery occlusion with cerebral infarction ([facility] 02/2017    weakness in left hand    [facility]est[facility]     CKD (chronic kidney disease) stage 3, GFR 30-59 ml/min ([facility]     COPD (chronic obstructive pulmonary disease) [facility]     Depression (emotion) 2/13/2016    Diabetes mellitus ([facility]     Diabetic neuropathy ([facility] 10/23/2014    DM type 2 (diabetes mellitus, type 2) ([facility]     Edema     Hx MRSA infection resolved 8/2017    2 negative nasal screens - 8/2017 (hx in blood & CSF - 2016)    Hyperlipidemia     Hypertension     LAD stenosis 10/28/2014    Pneumonia many years ago    Sleep apnea     no machine    SOB (shortness of breath)     Type 2 diabetes mellitus with proteinuria or albuminuria 10/23/2014      Past Surgical History        Procedure Laterality Date    AORTIC VALVE REPLACEMENT  july 2015    CARDIAC CATHETERIZATION  2014    TONSILLECTOMY         Meds    Current Medications    bumetanide  2 mg Intravenous BID    dermacerin   Topical Daily    bumetanide  1 mg Intravenous Once    carvedilol  25 mg Oral BID WC    aspirin  81 mg Oral Daily    atorvastatin  40 mg Oral Daily    DULoxetine  30 mg Oral Daily    ferrous sulfate  325 mg Oral Daily with breakfast    gabapentin  100 mg Oral TID    insulin glargine  30 Units Subcutaneous Nightly    isosorbide mononitrate  30 mg Oral Daily    insulin lispro  0-12 Units Subcutaneous TID WC    insulin lispro  0-6 Units Subcutaneous Nightly     glucose, dextrose, glucagon (rDNA), dextrose,

## 2018-12-15 NOTE — CODE DOCUMENTATION
Pt awake- appears confused initial but easily oriented. Pt does not appear to know what happened. Continues on bipap Support provided t/o

## 2018-12-16 ENCOUNTER — APPOINTMENT (OUTPATIENT)
Dept: NUCLEAR MEDICINE | Age: 73
DRG: 291 | End: 2018-12-16
Payer: MEDICARE

## 2018-12-16 ENCOUNTER — APPOINTMENT (OUTPATIENT)
Dept: GENERAL RADIOLOGY | Age: 73
DRG: 291 | End: 2018-12-16
Payer: MEDICARE

## 2018-12-16 LAB
ANION GAP SERPL CALCULATED.3IONS-SCNC: 17 MMOL/L (ref 9–17)
BUN BLDV-MCNC: 36 MG/DL (ref 8–23)
BUN/CREAT BLD: 18 (ref 9–20)
CALCIUM SERPL-MCNC: 9.1 MG/DL (ref 8.6–10.4)
CHLORIDE BLD-SCNC: 104 MMOL/L (ref 98–107)
CO2: 24 MMOL/L (ref 20–31)
CREAT SERPL-MCNC: 2 MG/DL (ref 0.5–0.9)
GFR AFRICAN AMERICAN: 30 ML/MIN
GFR NON-AFRICAN AMERICAN: 24 ML/MIN
GFR SERPL CREATININE-BSD FRML MDRD: ABNORMAL ML/MIN/{1.73_M2}
GFR SERPL CREATININE-BSD FRML MDRD: ABNORMAL ML/MIN/{1.73_M2}
GLUCOSE BLD-MCNC: 202 MG/DL (ref 70–99)
GLUCOSE BLD-MCNC: 267 MG/DL (ref 65–105)
GLUCOSE BLD-MCNC: 325 MG/DL (ref 65–105)
HCT VFR BLD CALC: 27.9 % (ref 36–46)
HEMOGLOBIN: 9.2 G/DL (ref 12–16)
MCH RBC QN AUTO: 28.4 PG (ref 26–34)
MCHC RBC AUTO-ENTMCNC: 33.1 G/DL (ref 31–37)
MCV RBC AUTO: 85.6 FL (ref 80–100)
NRBC AUTOMATED: ABNORMAL PER 100 WBC
PARTIAL THROMBOPLASTIN TIME: 26.5 SEC (ref 23–31)
PDW BLD-RTO: 14.3 % (ref 11.5–14.5)
PLATELET # BLD: 215 K/UL (ref 130–400)
PMV BLD AUTO: 7.9 FL (ref 6–12)
POTASSIUM SERPL-SCNC: 4.4 MMOL/L (ref 3.7–5.3)
RBC # BLD: 3.26 M/UL (ref 4–5.2)
SODIUM BLD-SCNC: 145 MMOL/L (ref 135–144)
TROPONIN INTERP: NORMAL
TROPONIN T: <0.03 NG/ML
WBC # BLD: 6.2 K/UL (ref 3.5–11)

## 2018-12-16 PROCEDURE — 85730 THROMBOPLASTIN TIME PARTIAL: CPT

## 2018-12-16 PROCEDURE — 95816 EEG AWAKE AND DROWSY: CPT

## 2018-12-16 PROCEDURE — 94761 N-INVAS EAR/PLS OXIMETRY MLT: CPT

## 2018-12-16 PROCEDURE — A9540 TC99M MAA: HCPCS | Performed by: INTERNAL MEDICINE

## 2018-12-16 PROCEDURE — 94660 CPAP INITIATION&MGMT: CPT

## 2018-12-16 PROCEDURE — 2500000003 HC RX 250 WO HCPCS: Performed by: INTERNAL MEDICINE

## 2018-12-16 PROCEDURE — 2700000000 HC OXYGEN THERAPY PER DAY

## 2018-12-16 PROCEDURE — 6360000002 HC RX W HCPCS: Performed by: INTERNAL MEDICINE

## 2018-12-16 PROCEDURE — 94640 AIRWAY INHALATION TREATMENT: CPT

## 2018-12-16 PROCEDURE — 82947 ASSAY GLUCOSE BLOOD QUANT: CPT

## 2018-12-16 PROCEDURE — 6370000000 HC RX 637 (ALT 250 FOR IP): Performed by: INTERNAL MEDICINE

## 2018-12-16 PROCEDURE — 3430000000 HC RX DIAGNOSTIC RADIOPHARMACEUTICAL: Performed by: INTERNAL MEDICINE

## 2018-12-16 PROCEDURE — 78582 LUNG VENTILAT&PERFUS IMAGING: CPT

## 2018-12-16 PROCEDURE — A9538 TC99M PYROPHOSPHATE: HCPCS | Performed by: INTERNAL MEDICINE

## 2018-12-16 PROCEDURE — 85027 COMPLETE CBC AUTOMATED: CPT

## 2018-12-16 PROCEDURE — 36415 COLL VENOUS BLD VENIPUNCTURE: CPT

## 2018-12-16 PROCEDURE — 80048 BASIC METABOLIC PNL TOTAL CA: CPT

## 2018-12-16 PROCEDURE — 2580000003 HC RX 258: Performed by: INTERNAL MEDICINE

## 2018-12-16 PROCEDURE — 2000000000 HC ICU R&B

## 2018-12-16 PROCEDURE — 84484 ASSAY OF TROPONIN QUANT: CPT

## 2018-12-16 RX ORDER — HEPARIN SODIUM 1000 [USP'U]/ML
80 INJECTION, SOLUTION INTRAVENOUS; SUBCUTANEOUS PRN
Status: DISCONTINUED | OUTPATIENT
Start: 2018-12-16 | End: 2018-12-17 | Stop reason: ALTCHOICE

## 2018-12-16 RX ORDER — SODIUM CHLORIDE 0.9 % (FLUSH) 0.9 %
10 SYRINGE (ML) INJECTION PRN
Status: DISCONTINUED | OUTPATIENT
Start: 2018-12-16 | End: 2018-12-20 | Stop reason: HOSPADM

## 2018-12-16 RX ORDER — HYDRALAZINE HYDROCHLORIDE 50 MG/1
50 TABLET, FILM COATED ORAL EVERY 12 HOURS SCHEDULED
Status: DISCONTINUED | OUTPATIENT
Start: 2018-12-16 | End: 2018-12-16

## 2018-12-16 RX ORDER — HEPARIN SODIUM 1000 [USP'U]/ML
80 INJECTION, SOLUTION INTRAVENOUS; SUBCUTANEOUS ONCE
Status: COMPLETED | OUTPATIENT
Start: 2018-12-16 | End: 2018-12-16

## 2018-12-16 RX ORDER — HEPARIN SODIUM 1000 [USP'U]/ML
40 INJECTION, SOLUTION INTRAVENOUS; SUBCUTANEOUS PRN
Status: DISCONTINUED | OUTPATIENT
Start: 2018-12-16 | End: 2018-12-17 | Stop reason: ALTCHOICE

## 2018-12-16 RX ORDER — HEPARIN SODIUM 10000 [USP'U]/100ML
2100 INJECTION, SOLUTION INTRAVENOUS CONTINUOUS
Status: DISCONTINUED | OUTPATIENT
Start: 2018-12-16 | End: 2018-12-17

## 2018-12-16 RX ORDER — HYDRALAZINE HYDROCHLORIDE 20 MG/ML
20 INJECTION INTRAMUSCULAR; INTRAVENOUS EVERY 6 HOURS
Status: DISCONTINUED | OUTPATIENT
Start: 2018-12-16 | End: 2018-12-16

## 2018-12-16 RX ORDER — SODIUM CHLORIDE 0.9 % (FLUSH) 0.9 %
10 SYRINGE (ML) INJECTION 2 TIMES DAILY
Status: DISCONTINUED | OUTPATIENT
Start: 2018-12-16 | End: 2018-12-20 | Stop reason: HOSPADM

## 2018-12-16 RX ADMIN — METHYLPREDNISOLONE SODIUM SUCCINATE 40 MG: 40 INJECTION, POWDER, FOR SOLUTION INTRAMUSCULAR; INTRAVENOUS at 16:22

## 2018-12-16 RX ADMIN — IPRATROPIUM BROMIDE AND ALBUTEROL SULFATE 1 AMPULE: .5; 3 SOLUTION RESPIRATORY (INHALATION) at 19:15

## 2018-12-16 RX ADMIN — METHYLPREDNISOLONE SODIUM SUCCINATE 40 MG: 40 INJECTION, POWDER, FOR SOLUTION INTRAMUSCULAR; INTRAVENOUS at 09:36

## 2018-12-16 RX ADMIN — SKIN PROTECTANT: 33 OINTMENT TOPICAL at 16:23

## 2018-12-16 RX ADMIN — IPRATROPIUM BROMIDE AND ALBUTEROL SULFATE 1 AMPULE: .5; 3 SOLUTION RESPIRATORY (INHALATION) at 05:58

## 2018-12-16 RX ADMIN — GABAPENTIN 100 MG: 100 CAPSULE ORAL at 21:06

## 2018-12-16 RX ADMIN — INSULIN LISPRO 6 UNITS: 100 INJECTION, SOLUTION INTRAVENOUS; SUBCUTANEOUS at 17:37

## 2018-12-16 RX ADMIN — ATORVASTATIN CALCIUM 40 MG: 40 TABLET, FILM COATED ORAL at 09:36

## 2018-12-16 RX ADMIN — INSULIN LISPRO 4 UNITS: 100 INJECTION, SOLUTION INTRAVENOUS; SUBCUTANEOUS at 11:58

## 2018-12-16 RX ADMIN — CARVEDILOL 25 MG: 25 TABLET, FILM COATED ORAL at 17:36

## 2018-12-16 RX ADMIN — GABAPENTIN 100 MG: 100 CAPSULE ORAL at 09:36

## 2018-12-16 RX ADMIN — INSULIN LISPRO 4 UNITS: 100 INJECTION, SOLUTION INTRAVENOUS; SUBCUTANEOUS at 21:06

## 2018-12-16 RX ADMIN — Medication 31.4 MILLICURIE: at 13:32

## 2018-12-16 RX ADMIN — INSULIN LISPRO 4 UNITS: 100 INJECTION, SOLUTION INTRAVENOUS; SUBCUTANEOUS at 09:36

## 2018-12-16 RX ADMIN — METHYLPREDNISOLONE SODIUM SUCCINATE 40 MG: 40 INJECTION, POWDER, FOR SOLUTION INTRAMUSCULAR; INTRAVENOUS at 00:05

## 2018-12-16 RX ADMIN — HYDRALAZINE HYDROCHLORIDE 20 MG: 20 INJECTION INTRAMUSCULAR; INTRAVENOUS at 00:37

## 2018-12-16 RX ADMIN — Medication 5.9 MILLICURIE: at 14:11

## 2018-12-16 RX ADMIN — CARVEDILOL 25 MG: 25 TABLET, FILM COATED ORAL at 09:36

## 2018-12-16 RX ADMIN — BUMETANIDE 2 MG: 0.25 INJECTION INTRAMUSCULAR; INTRAVENOUS at 09:36

## 2018-12-16 RX ADMIN — IPRATROPIUM BROMIDE AND ALBUTEROL SULFATE 1 AMPULE: .5; 3 SOLUTION RESPIRATORY (INHALATION) at 11:19

## 2018-12-16 RX ADMIN — Medication 10 ML: at 16:23

## 2018-12-16 RX ADMIN — IPRATROPIUM BROMIDE AND ALBUTEROL SULFATE 1 AMPULE: .5; 3 SOLUTION RESPIRATORY (INHALATION) at 23:13

## 2018-12-16 RX ADMIN — GABAPENTIN 100 MG: 100 CAPSULE ORAL at 14:47

## 2018-12-16 RX ADMIN — ASPIRIN 81 MG: 81 TABLET, COATED ORAL at 09:36

## 2018-12-16 RX ADMIN — ISOSORBIDE MONONITRATE 30 MG: 30 TABLET ORAL at 09:36

## 2018-12-16 RX ADMIN — HYDRALAZINE HYDROCHLORIDE 20 MG: 20 INJECTION INTRAMUSCULAR; INTRAVENOUS at 06:13

## 2018-12-16 RX ADMIN — IPRATROPIUM BROMIDE AND ALBUTEROL SULFATE 1 AMPULE: .5; 3 SOLUTION RESPIRATORY (INHALATION) at 03:17

## 2018-12-16 RX ADMIN — DULOXETINE HYDROCHLORIDE 30 MG: 30 CAPSULE, DELAYED RELEASE ORAL at 09:36

## 2018-12-16 RX ADMIN — IPRATROPIUM BROMIDE AND ALBUTEROL SULFATE 1 AMPULE: .5; 3 SOLUTION RESPIRATORY (INHALATION) at 14:37

## 2018-12-16 RX ADMIN — HEPARIN SODIUM AND DEXTROSE 17 UNITS/KG/HR: 10000; 5 INJECTION INTRAVENOUS at 22:45

## 2018-12-16 RX ADMIN — HEPARIN SODIUM 9830 UNITS: 1000 INJECTION INTRAVENOUS; SUBCUTANEOUS at 22:45

## 2018-12-16 RX ADMIN — FERROUS SULFATE TAB EC 325 MG (65 MG FE EQUIVALENT) 325 MG: 325 (65 FE) TABLET DELAYED RESPONSE at 11:59

## 2018-12-16 RX ADMIN — INSULIN GLARGINE 30 UNITS: 100 INJECTION, SOLUTION SUBCUTANEOUS at 21:06

## 2018-12-16 ASSESSMENT — PAIN SCALES - GENERAL
PAINLEVEL_OUTOF10: 0

## 2018-12-16 NOTE — PROGRESS NOTES
Events yesterday noted  Pt does not recall anything   No complaints today  NSE, PVCs  Intermittent hypertensive episodes  Lungs have few basilar rales  CV RRR  TELLY  Moderate peripheral edema    W/u of event is in progress. If no etiology found, suspect the combination of hypoxia and hypotension as an etiology  (hypotension is probably 2nd to over diuresing )  Pt heart failure is primarily Rt heart failure 2nd to sleep apnea and scleroderma      CV status is stable  No arrhythmias noted.

## 2018-12-16 NOTE — PROGRESS NOTES
Temp:   98.8 °F (37.1 °C)    TempSrc:   Infrared    SpO2: 97% 99% 98% 97%   Weight:       Height:         24HR INTAKE/OUTPUT:      Intake/Output Summary (Last 24 hours) at 12/16/18 1006  Last data filed at 12/16/18 0557   Gross per 24 hour   Intake              211 ml   Output             1975 ml   Net            -1764 ml     Patient Vitals for the past 96 hrs (Last 3 readings):   Weight   12/15/18 1536 270 lb 15.1 oz (122.9 kg)   12/15/18 0400 259 lb (117.5 kg)   12/14/18 0447 261 lb 14.4 oz (118.8 kg)       Constitutional:  Alert, awake, no apparent distress  Cardiovascular:  S1, S2 without m/r/g  Respiratory few rales no wheezing  Abdomen: +bs, soft, nt  Ext: 2+LE edema    Data/  Recent Labs      12/15/18   1115  12/15/18   1425  12/16/18   0547   WBC  6.1  7.6  6.2   HGB  9.2*  9.2*  9.2*   HCT  27.6*  27.7*  27.9*   MCV  86.4  87.2  85.6   PLT  211  203  215     Recent Labs      12/15/18   0406  12/15/18   1425  12/16/18   0547   NA  141  144  145*   K  4.0  4.0  4.4   CL  104  102  104   CO2  26  24  24   GLUCOSE  87  132*  202*   BUN  34*  34*  36*   CREATININE  1.97*  2.23*  2.00*   LABGLOM  25*  22*  24*   GFRAA  30*  26*  30*         Assessment/   CKD stage 3 B associated with diabetic nephropathy-mild proteinuria     Acute on chronic diastolic CHF secondary to noncompliance on sodium restriction-     HTN- well controlled     Hypertensive heart disease.     Anemia related to CKD    Syncopy and hypoxia-r/o PE  Plan/   1. Hold bumex for now and follow results of V/Q scan  2. Monitor renal function  3 s/p  IV aranesp 40 mics ×1 on 12/13/18  4. Strict I's and O's, 1500 mls fluid restriction  5.   For MRI brain tomorrow    Discussed with Pulmonology      Romeo Jesus M.D, LAURITA  Nephrologist

## 2018-12-17 ENCOUNTER — APPOINTMENT (OUTPATIENT)
Dept: MRI IMAGING | Age: 73
DRG: 291 | End: 2018-12-17
Payer: MEDICARE

## 2018-12-17 LAB
ABSOLUTE EOS #: 0 K/UL (ref 0–0.4)
ABSOLUTE IMMATURE GRANULOCYTE: ABNORMAL K/UL (ref 0–0.3)
ABSOLUTE LYMPH #: 1.3 K/UL (ref 1–4.8)
ABSOLUTE MONO #: 0.4 K/UL (ref 0.2–0.8)
ANION GAP SERPL CALCULATED.3IONS-SCNC: 15 MMOL/L (ref 9–17)
BASOPHILS # BLD: 0 % (ref 0–2)
BASOPHILS ABSOLUTE: 0 K/UL (ref 0–0.2)
BUN BLDV-MCNC: 43 MG/DL (ref 8–23)
BUN/CREAT BLD: 22 (ref 9–20)
CALCIUM SERPL-MCNC: 9.1 MG/DL (ref 8.6–10.4)
CHLORIDE BLD-SCNC: 103 MMOL/L (ref 98–107)
CO2: 25 MMOL/L (ref 20–31)
CREAT SERPL-MCNC: 2 MG/DL (ref 0.5–0.9)
DIFFERENTIAL TYPE: ABNORMAL
EKG ATRIAL RATE: 61 BPM
EKG P AXIS: 52 DEGREES
EKG P-R INTERVAL: 176 MS
EKG Q-T INTERVAL: 426 MS
EKG QRS DURATION: 98 MS
EKG QTC CALCULATION (BAZETT): 428 MS
EKG R AXIS: -9 DEGREES
EKG T AXIS: 38 DEGREES
EKG VENTRICULAR RATE: 61 BPM
EOSINOPHILS RELATIVE PERCENT: 0 % (ref 1–4)
GFR AFRICAN AMERICAN: 30 ML/MIN
GFR NON-AFRICAN AMERICAN: 24 ML/MIN
GFR SERPL CREATININE-BSD FRML MDRD: ABNORMAL ML/MIN/{1.73_M2}
GFR SERPL CREATININE-BSD FRML MDRD: ABNORMAL ML/MIN/{1.73_M2}
GLUCOSE BLD-MCNC: 259 MG/DL (ref 65–105)
GLUCOSE BLD-MCNC: 261 MG/DL (ref 65–105)
GLUCOSE BLD-MCNC: 270 MG/DL (ref 65–105)
GLUCOSE BLD-MCNC: 293 MG/DL (ref 70–99)
HCT VFR BLD CALC: 28.2 % (ref 36–46)
HEMOGLOBIN: 9.1 G/DL (ref 12–16)
IMMATURE GRANULOCYTES: ABNORMAL %
LYMPHOCYTES # BLD: 12 % (ref 24–44)
MCH RBC QN AUTO: 28 PG (ref 26–34)
MCHC RBC AUTO-ENTMCNC: 32.3 G/DL (ref 31–37)
MCV RBC AUTO: 86.7 FL (ref 80–100)
MONOCYTES # BLD: 3 % (ref 1–7)
NRBC AUTOMATED: ABNORMAL PER 100 WBC
PARTIAL THROMBOPLASTIN TIME: >120 SEC (ref 23–31)
PDW BLD-RTO: 13.7 % (ref 11.5–14.5)
PLATELET # BLD: 224 K/UL (ref 130–400)
PLATELET ESTIMATE: ABNORMAL
PMV BLD AUTO: ABNORMAL FL (ref 6–12)
POTASSIUM SERPL-SCNC: 4.2 MMOL/L (ref 3.7–5.3)
RBC # BLD: 3.25 M/UL (ref 4–5.2)
RBC # BLD: ABNORMAL 10*6/UL
SEG NEUTROPHILS: 85 % (ref 36–66)
SEGMENTED NEUTROPHILS ABSOLUTE COUNT: 9.7 K/UL (ref 1.8–7.7)
SODIUM BLD-SCNC: 143 MMOL/L (ref 135–144)
WBC # BLD: 11.4 K/UL (ref 3.5–11)
WBC # BLD: ABNORMAL 10*3/UL

## 2018-12-17 PROCEDURE — 80048 BASIC METABOLIC PNL TOTAL CA: CPT

## 2018-12-17 PROCEDURE — 94640 AIRWAY INHALATION TREATMENT: CPT

## 2018-12-17 PROCEDURE — 2580000003 HC RX 258: Performed by: INTERNAL MEDICINE

## 2018-12-17 PROCEDURE — 93971 EXTREMITY STUDY: CPT

## 2018-12-17 PROCEDURE — 70551 MRI BRAIN STEM W/O DYE: CPT

## 2018-12-17 PROCEDURE — 6370000000 HC RX 637 (ALT 250 FOR IP): Performed by: INTERNAL MEDICINE

## 2018-12-17 PROCEDURE — 2700000000 HC OXYGEN THERAPY PER DAY

## 2018-12-17 PROCEDURE — 2060000000 HC ICU INTERMEDIATE R&B

## 2018-12-17 PROCEDURE — 6360000002 HC RX W HCPCS: Performed by: INTERNAL MEDICINE

## 2018-12-17 PROCEDURE — 85730 THROMBOPLASTIN TIME PARTIAL: CPT

## 2018-12-17 PROCEDURE — 85025 COMPLETE CBC W/AUTO DIFF WBC: CPT

## 2018-12-17 PROCEDURE — 82947 ASSAY GLUCOSE BLOOD QUANT: CPT

## 2018-12-17 PROCEDURE — 93970 EXTREMITY STUDY: CPT

## 2018-12-17 PROCEDURE — 94761 N-INVAS EAR/PLS OXIMETRY MLT: CPT

## 2018-12-17 PROCEDURE — 36415 COLL VENOUS BLD VENIPUNCTURE: CPT

## 2018-12-17 RX ORDER — LORAZEPAM 1 MG/1
1 TABLET ORAL EVERY 4 HOURS PRN
Status: COMPLETED | OUTPATIENT
Start: 2018-12-17 | End: 2018-12-17

## 2018-12-17 RX ORDER — HYDRALAZINE HYDROCHLORIDE 50 MG/1
50 TABLET, FILM COATED ORAL ONCE
Status: COMPLETED | OUTPATIENT
Start: 2018-12-17 | End: 2018-12-17

## 2018-12-17 RX ORDER — HYDRALAZINE HYDROCHLORIDE 50 MG/1
100 TABLET, FILM COATED ORAL EVERY 12 HOURS SCHEDULED
Status: DISCONTINUED | OUTPATIENT
Start: 2018-12-18 | End: 2018-12-18

## 2018-12-17 RX ORDER — HEPARIN SODIUM 5000 [USP'U]/ML
5000 INJECTION, SOLUTION INTRAVENOUS; SUBCUTANEOUS 2 TIMES DAILY
Status: DISCONTINUED | OUTPATIENT
Start: 2018-12-17 | End: 2018-12-20 | Stop reason: HOSPADM

## 2018-12-17 RX ORDER — HYDRALAZINE HYDROCHLORIDE 50 MG/1
50 TABLET, FILM COATED ORAL EVERY 12 HOURS SCHEDULED
Status: DISCONTINUED | OUTPATIENT
Start: 2018-12-17 | End: 2018-12-17

## 2018-12-17 RX ADMIN — METHYLPREDNISOLONE SODIUM SUCCINATE 40 MG: 40 INJECTION, POWDER, FOR SOLUTION INTRAMUSCULAR; INTRAVENOUS at 15:52

## 2018-12-17 RX ADMIN — INSULIN LISPRO 6 UNITS: 100 INJECTION, SOLUTION INTRAVENOUS; SUBCUTANEOUS at 16:29

## 2018-12-17 RX ADMIN — CARVEDILOL 25 MG: 25 TABLET, FILM COATED ORAL at 15:57

## 2018-12-17 RX ADMIN — METHYLPREDNISOLONE SODIUM SUCCINATE 40 MG: 40 INJECTION, POWDER, FOR SOLUTION INTRAMUSCULAR; INTRAVENOUS at 08:30

## 2018-12-17 RX ADMIN — INSULIN LISPRO 6 UNITS: 100 INJECTION, SOLUTION INTRAVENOUS; SUBCUTANEOUS at 06:00

## 2018-12-17 RX ADMIN — METHYLPREDNISOLONE SODIUM SUCCINATE 40 MG: 40 INJECTION, POWDER, FOR SOLUTION INTRAMUSCULAR; INTRAVENOUS at 00:17

## 2018-12-17 RX ADMIN — ASPIRIN 81 MG: 81 TABLET, COATED ORAL at 08:29

## 2018-12-17 RX ADMIN — IPRATROPIUM BROMIDE AND ALBUTEROL SULFATE 1 AMPULE: .5; 3 SOLUTION RESPIRATORY (INHALATION) at 15:47

## 2018-12-17 RX ADMIN — HYDRALAZINE HYDROCHLORIDE 50 MG: 50 TABLET, FILM COATED ORAL at 19:39

## 2018-12-17 RX ADMIN — FERROUS SULFATE TAB EC 325 MG (65 MG FE EQUIVALENT) 325 MG: 325 (65 FE) TABLET DELAYED RESPONSE at 08:28

## 2018-12-17 RX ADMIN — Medication 10 ML: at 21:48

## 2018-12-17 RX ADMIN — CARVEDILOL 25 MG: 25 TABLET, FILM COATED ORAL at 08:28

## 2018-12-17 RX ADMIN — GABAPENTIN 100 MG: 100 CAPSULE ORAL at 08:27

## 2018-12-17 RX ADMIN — HYDROCODONE BITARTRATE AND ACETAMINOPHEN 1 TABLET: 5; 325 TABLET ORAL at 21:50

## 2018-12-17 RX ADMIN — GABAPENTIN 100 MG: 100 CAPSULE ORAL at 20:25

## 2018-12-17 RX ADMIN — INSULIN GLARGINE 30 UNITS: 100 INJECTION, SOLUTION SUBCUTANEOUS at 20:24

## 2018-12-17 RX ADMIN — SKIN PROTECTANT: 33 OINTMENT TOPICAL at 08:31

## 2018-12-17 RX ADMIN — INSULIN LISPRO 3 UNITS: 100 INJECTION, SOLUTION INTRAVENOUS; SUBCUTANEOUS at 20:24

## 2018-12-17 RX ADMIN — LORAZEPAM 1 MG: 1 TABLET ORAL at 08:28

## 2018-12-17 RX ADMIN — ATORVASTATIN CALCIUM 40 MG: 40 TABLET, FILM COATED ORAL at 08:28

## 2018-12-17 RX ADMIN — INSULIN LISPRO 6 UNITS: 100 INJECTION, SOLUTION INTRAVENOUS; SUBCUTANEOUS at 08:26

## 2018-12-17 RX ADMIN — HEPARIN SODIUM 5000 UNITS: 5000 INJECTION INTRAVENOUS; SUBCUTANEOUS at 20:24

## 2018-12-17 RX ADMIN — HYDRALAZINE HYDROCHLORIDE 50 MG: 50 TABLET, FILM COATED ORAL at 08:28

## 2018-12-17 RX ADMIN — IPRATROPIUM BROMIDE AND ALBUTEROL SULFATE 1 AMPULE: .5; 3 SOLUTION RESPIRATORY (INHALATION) at 07:58

## 2018-12-17 RX ADMIN — IPRATROPIUM BROMIDE AND ALBUTEROL SULFATE 1 AMPULE: .5; 3 SOLUTION RESPIRATORY (INHALATION) at 20:05

## 2018-12-17 RX ADMIN — DULOXETINE HYDROCHLORIDE 30 MG: 30 CAPSULE, DELAYED RELEASE ORAL at 08:27

## 2018-12-17 RX ADMIN — GABAPENTIN 100 MG: 100 CAPSULE ORAL at 13:26

## 2018-12-17 RX ADMIN — HYDRALAZINE HYDROCHLORIDE 50 MG: 50 TABLET, FILM COATED ORAL at 21:59

## 2018-12-17 RX ADMIN — ISOSORBIDE MONONITRATE 30 MG: 30 TABLET ORAL at 08:27

## 2018-12-17 ASSESSMENT — PAIN SCALES - GENERAL
PAINLEVEL_OUTOF10: 0
PAINLEVEL_OUTOF10: 2
PAINLEVEL_OUTOF10: 0
PAINLEVEL_OUTOF10: 7

## 2018-12-17 ASSESSMENT — PAIN DESCRIPTION - PAIN TYPE: TYPE: CHRONIC PAIN

## 2018-12-17 ASSESSMENT — PAIN DESCRIPTION - LOCATION: LOCATION: LEG

## 2018-12-17 ASSESSMENT — PAIN DESCRIPTION - ORIENTATION: ORIENTATION: RIGHT

## 2018-12-17 NOTE — CARE COORDINATION
Discharge Planning    Met with patient to review needs. She has received walker from [employer] and has it in her possession. Educated on home care but she declines any need and expects to go home without any services. Did discuss the need to check prior to discharge to see if she requires home oxygen and she is agreeable to that. She had questions about getting a wheelchair for use outside the home. Explained Medicare would have to approve and if order written by Dr with medical necessity would send to [employer]. Discussed other options of checking for used wheelchairs.

## 2018-12-17 NOTE — PROGRESS NOTES
Problem: Patient Care Overview  Goal: Plan of Care Review  Outcome: Ongoing (interventions implemented as appropriate)  Plan of care reviewed with patient & family. [place] traction to L. Leg in use. Orozco catheter intact & patent. Decreased uop this shift, IV fluids started. NPO after MN. SR on telemetry. Medicated for pain once this shift, full relief obtained. Remains free from falls/injury. Instructed to call for assistance as needed during night. Verbalized understanding. Call light in reach. Avasys in use.        POCHCO3 25.4 12/15/2018    QDK2LQL 23.7 10/27/2014    NBEA 1 12/15/2018    PBEA NOT REPORTED 12/15/2018    ULF3IJP 27 12/15/2018    EDEK7FPH 99 12/15/2018    I9KCFJBK 94.1 10/27/2014    FIO2 50.0 12/15/2018     Radiology:    VQ scan: No VQ mismatch, intermediate probability    Impression:  · Acute respiratory failure  · 40-pack-year smoking/COPD  · Obesity/Obstructive sleep apnea  · ?   Scleroderma/sarcoidosis/RML lung nodule  · Change in mental status, much improved    Recommendations:  · Continue IV antibiotics  · IV solu-medrol, decrease dose and frequency  · Albuterol and Ipratropium Q 4 hours and prn  · Dulera 200  · X-ray chest in am  · Labs: CBC and BMP in am  · BiPAP, while asleep  · Wean oxygen at 2 liters/min via nasal cannula  · DVT prophylaxis, on IV heparin  · OK to move to step down unit from pulmonary stand point  · Will follow with you    David Riley MD, [facility]  Pulmonary Critical Care and Sleep Medicine,  [facility]  Cell: [phone]  Office: [phone]

## 2018-12-18 LAB
ANION GAP SERPL CALCULATED.3IONS-SCNC: 13 MMOL/L (ref 9–17)
BUN BLDV-MCNC: 47 MG/DL (ref 8–23)
BUN/CREAT BLD: 25 (ref 9–20)
CALCIUM SERPL-MCNC: 9.2 MG/DL (ref 8.6–10.4)
CHLORIDE BLD-SCNC: 99 MMOL/L (ref 98–107)
CO2: 26 MMOL/L (ref 20–31)
CREAT SERPL-MCNC: 1.88 MG/DL (ref 0.5–0.9)
GFR AFRICAN AMERICAN: 32 ML/MIN
GFR NON-AFRICAN AMERICAN: 26 ML/MIN
GFR SERPL CREATININE-BSD FRML MDRD: ABNORMAL ML/MIN/{1.73_M2}
GFR SERPL CREATININE-BSD FRML MDRD: ABNORMAL ML/MIN/{1.73_M2}
GLUCOSE BLD-MCNC: 265 MG/DL (ref 65–105)
GLUCOSE BLD-MCNC: 318 MG/DL (ref 65–105)
GLUCOSE BLD-MCNC: 333 MG/DL (ref 70–99)
GLUCOSE BLD-MCNC: 343 MG/DL (ref 65–105)
GLUCOSE BLD-MCNC: 346 MG/DL (ref 65–105)
PLATELET # BLD: 208 K/UL (ref 130–400)
POTASSIUM SERPL-SCNC: 4.3 MMOL/L (ref 3.7–5.3)
SODIUM BLD-SCNC: 138 MMOL/L (ref 135–144)

## 2018-12-18 PROCEDURE — 2700000000 HC OXYGEN THERAPY PER DAY

## 2018-12-18 PROCEDURE — 6360000002 HC RX W HCPCS: Performed by: INTERNAL MEDICINE

## 2018-12-18 PROCEDURE — 97530 THERAPEUTIC ACTIVITIES: CPT

## 2018-12-18 PROCEDURE — 97535 SELF CARE MNGMENT TRAINING: CPT

## 2018-12-18 PROCEDURE — G8987 SELF CARE CURRENT STATUS: HCPCS

## 2018-12-18 PROCEDURE — 6370000000 HC RX 637 (ALT 250 FOR IP): Performed by: INTERNAL MEDICINE

## 2018-12-18 PROCEDURE — 36415 COLL VENOUS BLD VENIPUNCTURE: CPT

## 2018-12-18 PROCEDURE — 2580000003 HC RX 258: Performed by: INTERNAL MEDICINE

## 2018-12-18 PROCEDURE — G8988 SELF CARE GOAL STATUS: HCPCS

## 2018-12-18 PROCEDURE — 85049 AUTOMATED PLATELET COUNT: CPT

## 2018-12-18 PROCEDURE — 94760 N-INVAS EAR/PLS OXIMETRY 1: CPT

## 2018-12-18 PROCEDURE — 80048 BASIC METABOLIC PNL TOTAL CA: CPT

## 2018-12-18 PROCEDURE — 97166 OT EVAL MOD COMPLEX 45 MIN: CPT

## 2018-12-18 PROCEDURE — 82947 ASSAY GLUCOSE BLOOD QUANT: CPT

## 2018-12-18 PROCEDURE — 94660 CPAP INITIATION&MGMT: CPT

## 2018-12-18 PROCEDURE — 2060000000 HC ICU INTERMEDIATE R&B

## 2018-12-18 PROCEDURE — 94640 AIRWAY INHALATION TREATMENT: CPT

## 2018-12-18 RX ORDER — HYDRALAZINE HYDROCHLORIDE 20 MG/ML
20 INJECTION INTRAMUSCULAR; INTRAVENOUS ONCE
Status: COMPLETED | OUTPATIENT
Start: 2018-12-18 | End: 2018-12-18

## 2018-12-18 RX ORDER — CLONIDINE HYDROCHLORIDE 0.1 MG/1
0.3 TABLET ORAL 3 TIMES DAILY
Status: DISCONTINUED | OUTPATIENT
Start: 2018-12-18 | End: 2018-12-20 | Stop reason: HOSPADM

## 2018-12-18 RX ORDER — BUMETANIDE 1 MG/1
1 TABLET ORAL DAILY
Status: DISCONTINUED | OUTPATIENT
Start: 2018-12-18 | End: 2018-12-20

## 2018-12-18 RX ORDER — CLONIDINE HYDROCHLORIDE 0.1 MG/1
0.2 TABLET ORAL 3 TIMES DAILY
Status: DISCONTINUED | OUTPATIENT
Start: 2018-12-18 | End: 2018-12-18

## 2018-12-18 RX ORDER — DOXAZOSIN MESYLATE 4 MG/1
4 TABLET ORAL NIGHTLY
Status: DISCONTINUED | OUTPATIENT
Start: 2018-12-18 | End: 2018-12-18

## 2018-12-18 RX ORDER — CLONIDINE HYDROCHLORIDE 0.1 MG/1
0.2 TABLET ORAL ONCE
Status: COMPLETED | OUTPATIENT
Start: 2018-12-18 | End: 2018-12-18

## 2018-12-18 RX ORDER — PREDNISONE 20 MG/1
20 TABLET ORAL 2 TIMES DAILY
Status: DISCONTINUED | OUTPATIENT
Start: 2018-12-18 | End: 2018-12-20 | Stop reason: HOSPADM

## 2018-12-18 RX ORDER — HYDRALAZINE HYDROCHLORIDE 50 MG/1
100 TABLET, FILM COATED ORAL EVERY 8 HOURS SCHEDULED
Status: DISCONTINUED | OUTPATIENT
Start: 2018-12-18 | End: 2018-12-18

## 2018-12-18 RX ORDER — CLONIDINE HYDROCHLORIDE 0.1 MG/1
0.1 TABLET ORAL ONCE
Status: COMPLETED | OUTPATIENT
Start: 2018-12-18 | End: 2018-12-18

## 2018-12-18 RX ORDER — CLONIDINE HYDROCHLORIDE 0.1 MG/1
0.1 TABLET ORAL 3 TIMES DAILY
Status: DISCONTINUED | OUTPATIENT
Start: 2018-12-18 | End: 2018-12-18

## 2018-12-18 RX ORDER — INSULIN GLARGINE 100 [IU]/ML
40 INJECTION, SOLUTION SUBCUTANEOUS NIGHTLY
Status: DISCONTINUED | OUTPATIENT
Start: 2018-12-18 | End: 2018-12-20 | Stop reason: HOSPADM

## 2018-12-18 RX ORDER — POLYETHYLENE GLYCOL 3350 17 G/17G
17 POWDER, FOR SOLUTION ORAL ONCE
Status: COMPLETED | OUTPATIENT
Start: 2018-12-18 | End: 2018-12-18

## 2018-12-18 RX ADMIN — HEPARIN SODIUM 5000 UNITS: 5000 INJECTION INTRAVENOUS; SUBCUTANEOUS at 20:56

## 2018-12-18 RX ADMIN — FERROUS SULFATE TAB EC 325 MG (65 MG FE EQUIVALENT) 325 MG: 325 (65 FE) TABLET DELAYED RESPONSE at 09:35

## 2018-12-18 RX ADMIN — INSULIN LISPRO 5 UNITS: 100 INJECTION, SOLUTION INTRAVENOUS; SUBCUTANEOUS at 20:55

## 2018-12-18 RX ADMIN — CARVEDILOL 25 MG: 25 TABLET, FILM COATED ORAL at 16:12

## 2018-12-18 RX ADMIN — HYDRALAZINE HYDROCHLORIDE 100 MG: 50 TABLET, FILM COATED ORAL at 05:10

## 2018-12-18 RX ADMIN — IPRATROPIUM BROMIDE AND ALBUTEROL SULFATE 1 AMPULE: .5; 3 SOLUTION RESPIRATORY (INHALATION) at 15:35

## 2018-12-18 RX ADMIN — ASPIRIN 81 MG: 81 TABLET, COATED ORAL at 09:35

## 2018-12-18 RX ADMIN — IPRATROPIUM BROMIDE AND ALBUTEROL SULFATE 1 AMPULE: .5; 3 SOLUTION RESPIRATORY (INHALATION) at 07:59

## 2018-12-18 RX ADMIN — GABAPENTIN 100 MG: 100 CAPSULE ORAL at 09:35

## 2018-12-18 RX ADMIN — Medication 10 ML: at 09:00

## 2018-12-18 RX ADMIN — ISOSORBIDE MONONITRATE 30 MG: 30 TABLET ORAL at 09:35

## 2018-12-18 RX ADMIN — Medication 10 ML: at 20:56

## 2018-12-18 RX ADMIN — HYDRALAZINE HYDROCHLORIDE 20 MG: 20 INJECTION INTRAMUSCULAR; INTRAVENOUS at 05:10

## 2018-12-18 RX ADMIN — IPRATROPIUM BROMIDE AND ALBUTEROL SULFATE 1 AMPULE: .5; 3 SOLUTION RESPIRATORY (INHALATION) at 11:22

## 2018-12-18 RX ADMIN — CLONIDINE HYDROCHLORIDE 0.3 MG: 0.1 TABLET ORAL at 20:55

## 2018-12-18 RX ADMIN — CLONIDINE HYDROCHLORIDE 0.1 MG: 0.1 TABLET ORAL at 02:22

## 2018-12-18 RX ADMIN — INSULIN LISPRO 8 UNITS: 100 INJECTION, SOLUTION INTRAVENOUS; SUBCUTANEOUS at 09:32

## 2018-12-18 RX ADMIN — BUMETANIDE 1 MG: 1 TABLET ORAL at 12:20

## 2018-12-18 RX ADMIN — PREDNISONE 20 MG: 20 TABLET ORAL at 20:55

## 2018-12-18 RX ADMIN — HEPARIN SODIUM 5000 UNITS: 5000 INJECTION INTRAVENOUS; SUBCUTANEOUS at 09:36

## 2018-12-18 RX ADMIN — GABAPENTIN 100 MG: 100 CAPSULE ORAL at 14:30

## 2018-12-18 RX ADMIN — GABAPENTIN 100 MG: 100 CAPSULE ORAL at 20:55

## 2018-12-18 RX ADMIN — POLYETHYLENE GLYCOL 3350 17 G: 17 POWDER, FOR SOLUTION ORAL at 14:30

## 2018-12-18 RX ADMIN — SKIN PROTECTANT: 33 OINTMENT TOPICAL at 09:36

## 2018-12-18 RX ADMIN — HYDRALAZINE HYDROCHLORIDE 100 MG: 50 TABLET, FILM COATED ORAL at 14:30

## 2018-12-18 RX ADMIN — DULOXETINE HYDROCHLORIDE 30 MG: 30 CAPSULE, DELAYED RELEASE ORAL at 09:35

## 2018-12-18 RX ADMIN — ATORVASTATIN CALCIUM 40 MG: 40 TABLET, FILM COATED ORAL at 09:35

## 2018-12-18 RX ADMIN — IPRATROPIUM BROMIDE AND ALBUTEROL SULFATE 1 AMPULE: .5; 3 SOLUTION RESPIRATORY (INHALATION) at 23:40

## 2018-12-18 RX ADMIN — CLONIDINE HYDROCHLORIDE 0.1 MG: 0.1 TABLET ORAL at 12:15

## 2018-12-18 RX ADMIN — HYDRALAZINE HYDROCHLORIDE 100 MG: 50 TABLET, FILM COATED ORAL at 00:30

## 2018-12-18 RX ADMIN — IPRATROPIUM BROMIDE AND ALBUTEROL SULFATE 1 AMPULE: .5; 3 SOLUTION RESPIRATORY (INHALATION) at 00:36

## 2018-12-18 RX ADMIN — IPRATROPIUM BROMIDE AND ALBUTEROL SULFATE 1 AMPULE: .5; 3 SOLUTION RESPIRATORY (INHALATION) at 03:50

## 2018-12-18 RX ADMIN — INSULIN LISPRO 8 UNITS: 100 INJECTION, SOLUTION INTRAVENOUS; SUBCUTANEOUS at 16:12

## 2018-12-18 RX ADMIN — IPRATROPIUM BROMIDE AND ALBUTEROL SULFATE 1 AMPULE: .5; 3 SOLUTION RESPIRATORY (INHALATION) at 20:52

## 2018-12-18 RX ADMIN — METHYLPREDNISOLONE SODIUM SUCCINATE 40 MG: 40 INJECTION, POWDER, FOR SOLUTION INTRAMUSCULAR; INTRAVENOUS at 00:30

## 2018-12-18 RX ADMIN — CARVEDILOL 25 MG: 25 TABLET, FILM COATED ORAL at 09:33

## 2018-12-18 RX ADMIN — CLONIDINE HYDROCHLORIDE 0.2 MG: 0.1 TABLET ORAL at 03:53

## 2018-12-18 RX ADMIN — PREDNISONE 20 MG: 20 TABLET ORAL at 12:20

## 2018-12-18 RX ADMIN — INSULIN GLARGINE 40 UNITS: 100 INJECTION, SOLUTION SUBCUTANEOUS at 20:56

## 2018-12-18 RX ADMIN — INSULIN LISPRO 8 UNITS: 100 INJECTION, SOLUTION INTRAVENOUS; SUBCUTANEOUS at 12:22

## 2018-12-18 RX ADMIN — DOXAZOSIN 4 MG: 4 TABLET ORAL at 20:55

## 2018-12-18 RX ADMIN — METHYLPREDNISOLONE SODIUM SUCCINATE 40 MG: 40 INJECTION, POWDER, FOR SOLUTION INTRAMUSCULAR; INTRAVENOUS at 09:35

## 2018-12-18 ASSESSMENT — PAIN SCALES - GENERAL
PAINLEVEL_OUTOF10: 0

## 2018-12-18 NOTE — PROGRESS NOTES
PO2ART 72.0 10/27/2014    POCHCO3 25.4 12/15/2018    ELR9WUC 23.7 10/27/2014    NBEA 1 12/15/2018    PBEA NOT REPORTED 12/15/2018    BYR9QMO 27 12/15/2018    CQKJ7XSA 99 12/15/2018    D1AOTTUU 94.1 10/27/2014    FIO2 50.0 12/15/2018     Radiology:    VQ scan: No VQ mismatch, intermediate probability    Impression:  · Acute respiratory failure  · 40-pack-year smoking/COPD  · Obesity/Obstructive sleep apnea  · ?   Scleroderma/sarcoidosis/RML lung nodule  · Change in mental status, much improved    Recommendations:  · Discontinue IV solu-medrol  · Prednisone taper  · Albuterol and Ipratropium Q 4 hours and prn  · Dulera 200  · X-ray chest in am  · Labs: CBC and BMP in am  · BiPAP, while asleep  · Wean oxygen at 2 liters/min via nasal cannula  · DVT prophylaxis, on SQ heparin  · Will follow with you    Nii Mendoza MD, [facility]  Pulmonary Critical Care and Sleep Medicine,  [facility]  Cell: [phone]  Office: [phone]

## 2018-12-18 NOTE — FLOWSHEET NOTE
12/18/18 0502   Provider Notification   Reason for Communication Review case   Provider Name Dr. Ren Favors   Provider Notification Physician   Method of Communication Call   Response See orders   Notification Time 0502   RN phoned MD regarding pt's /69. Received order to give 20 mg IV hydralazine once. Will continue to monitor.

## 2018-12-18 NOTE — PROGRESS NOTES
Department of Internal Medicine  Section of Cardiology  [facility] Progress Note      SUBJECTIVE:  Tired today, said she was awakened frequently last night for b/p checks    OBJECTIVE    Current Inpatient Medications  Current Facility-Administered Medications: hydrALAZINE (APRESOLINE) tablet 100 mg, 100 mg, Oral, 3 times per day  predniSONE (DELTASONE) tablet 20 mg, 20 mg, Oral, BID  cloNIDine (CATAPRES) tablet 0.1 mg, 0.1 mg, Oral, TID  bumetanide (BUMEX) tablet 1 mg, 1 mg, Oral, Daily  insulin glargine (LANTUS) injection vial 40 Units, 40 Units, Subcutaneous, Nightly  heparin (porcine) injection 5,000 Units, 5,000 Units, Subcutaneous, BID  sodium chloride flush 0.9 % injection 10 mL, 10 mL, Intravenous, PRN  sodium chloride flush 0.9 % injection 10 mL, 10 mL, Intravenous, BID  ipratropium-albuterol (DUONEB) nebulizer solution 1 ampule, 1 ampule, Inhalation, Q4H  dermacerin (EUCERIN) cream, , Topical, Daily  carvedilol (COREG) tablet 25 mg, 25 mg, Oral, BID WC  glucose (GLUTOSE) 40 % oral gel 15 g, 15 g, Oral, PRN  dextrose 50 % solution 12.5 g, 12.5 g, Intravenous, PRN  glucagon (rDNA) injection 1 mg, 1 mg, Intramuscular, PRN  dextrose 5 % solution, 100 mL/hr, Intravenous, PRN  aspirin EC tablet 81 mg, 81 mg, Oral, Daily  atorvastatin (LIPITOR) tablet 40 mg, 40 mg, Oral, Daily  DULoxetine (CYMBALTA) extended release capsule 30 mg, 30 mg, Oral, Daily  ferrous sulfate EC tablet 325 mg, 325 mg, Oral, Daily with breakfast  gabapentin (NEURONTIN) capsule 100 mg, 100 mg, Oral, TID  isosorbide mononitrate (IMDUR) extended release tablet 30 mg, 30 mg, Oral, Daily  insulin lispro (HUMALOG) injection vial 0-12 Units, 0-12 Units, Subcutaneous, TID WC  insulin lispro (HUMALOG) injection vial 0-6 Units, 0-6 Units, Subcutaneous, Nightly  HYDROcodone-acetaminophen (NORCO) 5-325 MG per tablet 1 tablet, 1 tablet, Oral, Q6H PRN  ipratropium-albuterol (DUONEB) nebulizer solution 1 ampule, 1 ampule, Inhalation, Daily

## 2018-12-18 NOTE — FLOWSHEET NOTE
12/18/18 1303   Vitals   BP (!) 162/55   MAP (mmHg) 76     BP recheck. Starting to trend down. Will continue to monitor.

## 2018-12-18 NOTE — FLOWSHEET NOTE
12/18/18 0219   Provider Notification   Reason for Communication Review case   Provider Name Dr. Kenia Maldonado   Provider Notification Physician   Method of Communication Call   Response See orders   Notification Time [phone] [id number]   RN phoned MD regarding pt's /7 HR 61 after giving 100 mg hydralazine. Received order to give 0.1 mg Clonidine PO once. Will continue to monitor.

## 2018-12-19 ENCOUNTER — APPOINTMENT (OUTPATIENT)
Dept: GENERAL RADIOLOGY | Age: 73
DRG: 291 | End: 2018-12-19
Payer: MEDICARE

## 2018-12-19 LAB
ABSOLUTE EOS #: 0 K/UL (ref 0–0.4)
ABSOLUTE IMMATURE GRANULOCYTE: ABNORMAL K/UL (ref 0–0.3)
ABSOLUTE LYMPH #: 1.3 K/UL (ref 1–4.8)
ABSOLUTE MONO #: 0.6 K/UL (ref 0.2–0.8)
ANION GAP SERPL CALCULATED.3IONS-SCNC: 12 MMOL/L (ref 9–17)
BASOPHILS # BLD: 0 % (ref 0–2)
BASOPHILS ABSOLUTE: 0 K/UL (ref 0–0.2)
BUN BLDV-MCNC: 45 MG/DL (ref 8–23)
BUN/CREAT BLD: 26 (ref 9–20)
CALCIUM SERPL-MCNC: 9.1 MG/DL (ref 8.6–10.4)
CHLORIDE BLD-SCNC: 101 MMOL/L (ref 98–107)
CO2: 27 MMOL/L (ref 20–31)
CREAT SERPL-MCNC: 1.71 MG/DL (ref 0.5–0.9)
DIFFERENTIAL TYPE: ABNORMAL
EOSINOPHILS RELATIVE PERCENT: 0 % (ref 1–4)
GFR AFRICAN AMERICAN: 35 ML/MIN
GFR NON-AFRICAN AMERICAN: 29 ML/MIN
GFR SERPL CREATININE-BSD FRML MDRD: ABNORMAL ML/MIN/{1.73_M2}
GFR SERPL CREATININE-BSD FRML MDRD: ABNORMAL ML/MIN/{1.73_M2}
GLUCOSE BLD-MCNC: 226 MG/DL (ref 65–105)
GLUCOSE BLD-MCNC: 242 MG/DL (ref 65–105)
GLUCOSE BLD-MCNC: 280 MG/DL (ref 65–105)
GLUCOSE BLD-MCNC: 283 MG/DL (ref 65–105)
GLUCOSE BLD-MCNC: 330 MG/DL (ref 65–105)
GLUCOSE BLD-MCNC: 331 MG/DL (ref 70–99)
GLUCOSE BLD-MCNC: 466 MG/DL (ref 65–105)
HCT VFR BLD CALC: 30.9 % (ref 36–46)
HEMOGLOBIN: 9.7 G/DL (ref 12–16)
IMMATURE GRANULOCYTES: ABNORMAL %
LYMPHOCYTES # BLD: 13 % (ref 24–44)
MCH RBC QN AUTO: 27.2 PG (ref 26–34)
MCHC RBC AUTO-ENTMCNC: 31.3 G/DL (ref 31–37)
MCV RBC AUTO: 87 FL (ref 80–100)
MONOCYTES # BLD: 6 % (ref 1–7)
NRBC AUTOMATED: ABNORMAL PER 100 WBC
PDW BLD-RTO: 13.7 % (ref 11.5–14.5)
PLATELET # BLD: 242 K/UL (ref 130–400)
PLATELET ESTIMATE: ABNORMAL
PMV BLD AUTO: ABNORMAL FL (ref 6–12)
POTASSIUM SERPL-SCNC: 4.6 MMOL/L (ref 3.7–5.3)
RBC # BLD: 3.55 M/UL (ref 4–5.2)
RBC # BLD: ABNORMAL 10*6/UL
SEG NEUTROPHILS: 81 % (ref 36–66)
SEGMENTED NEUTROPHILS ABSOLUTE COUNT: 8.2 K/UL (ref 1.8–7.7)
SODIUM BLD-SCNC: 140 MMOL/L (ref 135–144)
WBC # BLD: 10.1 K/UL (ref 3.5–11)
WBC # BLD: ABNORMAL 10*3/UL

## 2018-12-19 PROCEDURE — 97162 PT EVAL MOD COMPLEX 30 MIN: CPT

## 2018-12-19 PROCEDURE — 6370000000 HC RX 637 (ALT 250 FOR IP): Performed by: INTERNAL MEDICINE

## 2018-12-19 PROCEDURE — 94760 N-INVAS EAR/PLS OXIMETRY 1: CPT

## 2018-12-19 PROCEDURE — 2700000000 HC OXYGEN THERAPY PER DAY

## 2018-12-19 PROCEDURE — G8979 MOBILITY GOAL STATUS: HCPCS

## 2018-12-19 PROCEDURE — G8978 MOBILITY CURRENT STATUS: HCPCS

## 2018-12-19 PROCEDURE — 93976 VASCULAR STUDY: CPT

## 2018-12-19 PROCEDURE — 94660 CPAP INITIATION&MGMT: CPT

## 2018-12-19 PROCEDURE — 71045 X-RAY EXAM CHEST 1 VIEW: CPT

## 2018-12-19 PROCEDURE — 97116 GAIT TRAINING THERAPY: CPT

## 2018-12-19 PROCEDURE — 94640 AIRWAY INHALATION TREATMENT: CPT

## 2018-12-19 PROCEDURE — 82947 ASSAY GLUCOSE BLOOD QUANT: CPT

## 2018-12-19 PROCEDURE — 97110 THERAPEUTIC EXERCISES: CPT

## 2018-12-19 PROCEDURE — 97535 SELF CARE MNGMENT TRAINING: CPT

## 2018-12-19 PROCEDURE — 2060000000 HC ICU INTERMEDIATE R&B

## 2018-12-19 PROCEDURE — 2580000003 HC RX 258: Performed by: INTERNAL MEDICINE

## 2018-12-19 PROCEDURE — 6360000002 HC RX W HCPCS: Performed by: INTERNAL MEDICINE

## 2018-12-19 PROCEDURE — 80048 BASIC METABOLIC PNL TOTAL CA: CPT

## 2018-12-19 PROCEDURE — 85025 COMPLETE CBC W/AUTO DIFF WBC: CPT

## 2018-12-19 PROCEDURE — 36415 COLL VENOUS BLD VENIPUNCTURE: CPT

## 2018-12-19 RX ORDER — AMLODIPINE BESYLATE 5 MG/1
5 TABLET ORAL ONCE
Status: COMPLETED | OUTPATIENT
Start: 2018-12-19 | End: 2018-12-19

## 2018-12-19 RX ADMIN — CLONIDINE HYDROCHLORIDE 0.3 MG: 0.1 TABLET ORAL at 21:50

## 2018-12-19 RX ADMIN — HEPARIN SODIUM 5000 UNITS: 5000 INJECTION INTRAVENOUS; SUBCUTANEOUS at 08:52

## 2018-12-19 RX ADMIN — PREDNISONE 20 MG: 20 TABLET ORAL at 08:52

## 2018-12-19 RX ADMIN — GABAPENTIN 100 MG: 100 CAPSULE ORAL at 21:51

## 2018-12-19 RX ADMIN — DOXAZOSIN 10 MG: 1 TABLET ORAL at 21:49

## 2018-12-19 RX ADMIN — AMLODIPINE BESYLATE 5 MG: 5 TABLET ORAL at 06:20

## 2018-12-19 RX ADMIN — CLONIDINE HYDROCHLORIDE 0.3 MG: 0.1 TABLET ORAL at 14:36

## 2018-12-19 RX ADMIN — ATORVASTATIN CALCIUM 40 MG: 40 TABLET, FILM COATED ORAL at 08:52

## 2018-12-19 RX ADMIN — CARVEDILOL 25 MG: 25 TABLET, FILM COATED ORAL at 08:51

## 2018-12-19 RX ADMIN — DULOXETINE HYDROCHLORIDE 30 MG: 30 CAPSULE, DELAYED RELEASE ORAL at 08:51

## 2018-12-19 RX ADMIN — INSULIN LISPRO 9 UNITS: 100 INJECTION, SOLUTION INTRAVENOUS; SUBCUTANEOUS at 14:37

## 2018-12-19 RX ADMIN — IPRATROPIUM BROMIDE AND ALBUTEROL SULFATE 1 AMPULE: .5; 3 SOLUTION RESPIRATORY (INHALATION) at 03:34

## 2018-12-19 RX ADMIN — CLONIDINE HYDROCHLORIDE 0.3 MG: 0.1 TABLET ORAL at 08:51

## 2018-12-19 RX ADMIN — BUMETANIDE 1 MG: 1 TABLET ORAL at 08:51

## 2018-12-19 RX ADMIN — INSULIN LISPRO 12 UNITS: 100 INJECTION, SOLUTION INTRAVENOUS; SUBCUTANEOUS at 08:51

## 2018-12-19 RX ADMIN — PREDNISONE 20 MG: 20 TABLET ORAL at 21:50

## 2018-12-19 RX ADMIN — FERROUS SULFATE TAB EC 325 MG (65 MG FE EQUIVALENT) 325 MG: 325 (65 FE) TABLET DELAYED RESPONSE at 08:51

## 2018-12-19 RX ADMIN — SKIN PROTECTANT: 33 OINTMENT TOPICAL at 14:30

## 2018-12-19 RX ADMIN — GABAPENTIN 100 MG: 100 CAPSULE ORAL at 14:36

## 2018-12-19 RX ADMIN — CARVEDILOL 25 MG: 25 TABLET, FILM COATED ORAL at 17:44

## 2018-12-19 RX ADMIN — Medication 10 ML: at 14:30

## 2018-12-19 RX ADMIN — IPRATROPIUM BROMIDE AND ALBUTEROL SULFATE 1 AMPULE: .5; 3 SOLUTION RESPIRATORY (INHALATION) at 11:58

## 2018-12-19 RX ADMIN — HEPARIN SODIUM 5000 UNITS: 5000 INJECTION INTRAVENOUS; SUBCUTANEOUS at 21:51

## 2018-12-19 RX ADMIN — INSULIN LISPRO 3 UNITS: 100 INJECTION, SOLUTION INTRAVENOUS; SUBCUTANEOUS at 21:52

## 2018-12-19 RX ADMIN — GABAPENTIN 100 MG: 100 CAPSULE ORAL at 08:52

## 2018-12-19 RX ADMIN — IPRATROPIUM BROMIDE AND ALBUTEROL SULFATE 1 AMPULE: .5; 3 SOLUTION RESPIRATORY (INHALATION) at 07:54

## 2018-12-19 RX ADMIN — IPRATROPIUM BROMIDE AND ALBUTEROL SULFATE 1 AMPULE: .5; 3 SOLUTION RESPIRATORY (INHALATION) at 15:47

## 2018-12-19 RX ADMIN — ASPIRIN 81 MG: 81 TABLET, COATED ORAL at 08:52

## 2018-12-19 RX ADMIN — ISOSORBIDE MONONITRATE 30 MG: 30 TABLET ORAL at 08:52

## 2018-12-19 RX ADMIN — INSULIN LISPRO 18 UNITS: 100 INJECTION, SOLUTION INTRAVENOUS; SUBCUTANEOUS at 17:23

## 2018-12-19 RX ADMIN — INSULIN GLARGINE 40 UNITS: 100 INJECTION, SOLUTION SUBCUTANEOUS at 21:52

## 2018-12-19 RX ADMIN — DOXAZOSIN 10 MG: 1 TABLET ORAL at 01:30

## 2018-12-19 RX ADMIN — Medication 10 ML: at 21:55

## 2018-12-19 ASSESSMENT — PAIN SCALES - GENERAL
PAINLEVEL_OUTOF10: 0

## 2018-12-19 NOTE — PROGRESS NOTES
3L/min  Vision/Hearing  Vision: Impaired  Vision Exceptions: Wears glasses at all times  Hearing: Within functional limits     Subjective  General  Chart Reviewed: Yes  Patient assessed for rehabilitation services?: Yes  Additional Pertinent Hx: code blue on 12/15/18 after found unresponsive & transferred to ICU, CVA, CKD 3, COPD, neuropathy  Response To Previous Treatment: Not applicable  General Comment  Comments: RN, Elaina Bennett PT  Subjective  Subjective: Pt agreeable to PT  Pain Screening  Patient Currently in Pain: Denies  Vital Signs  Patient Currently in Pain: Denies       Orientation  Orientation  Overall Orientation Status: Within Functional Limits  Social/Functional History  Social/Functional History  Lives With: Alone  Type of Home: [address]: One level  Home Access: Stairs to enter without rails (garage )  [address] [facility] - Number of Steps: 1 step   Bathroom Shower/Tub: Tub/Shower unit  Bathroom Toilet: Handicap height  Bathroom Equipment: Grab bars in shower, Shower chair  Bathroom Accessibility: Walker accessible  Home Equipment: [address] Help From: Family (pt states she has a supportive sister and can assist as needed at DC )  ADL Assistance: Independent  Homemaking Assistance: Independent  Homemaking Responsibilities: Yes  Ambulation Assistance: Independent (with cane )  Transfer Assistance: Independent  Active : No  Patient's  Info:  (sister assists as needed )  Occupation: Retired  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       AROM RLE (degrees)  RLE AROM: WFL  AROM LLE (degrees)  LLE AROM : WFL  AROM RUE (degrees)  RUE AROM : WFL  AROM LUE (degrees)  LUE AROM : WFL  Strength RLE  Comment: 4/5  Strength LLE  Comment: 4/5 hip  Strength RUE  Strength RUE: WFL  Strength

## 2018-12-19 NOTE — PROGRESS NOTES
Blood sugar recheck at this time. Patient remains asymptomatic. Trending down at this time. Will continue to monitor. Results for Caesar Walters (MRN [id number]) as of 12/19/2018 17:56   Ref.  Range 12/19/2018 17:46   POC Glucose Latest Ref Range: 65 - 105 mg/dL 283 (H)

## 2018-12-19 NOTE — PROGRESS NOTES
Intake/Output Summary (Last 24 hours) at 12/19/18 0802  Last data filed at 12/19/18 0600   Gross per 24 hour   Intake              360 ml   Output             2650 ml   Net            -2290 ml     Patient Vitals for the past 96 hrs (Last 3 readings):   Weight   12/19/18 0620 261 lb 3.9 oz (118.5 kg)   12/19/18 0000 264 lb 8.8 oz (120 kg)   12/17/18 0320 269 lb 1.6 oz (122.1 kg)       Constitutional:  Alert, awake, no apparent distress  Cardiovascular:  S1, S2 without m/r/g  Respiratory few rales no wheezing  Abdomen: +bs, soft, nt  Ext: 1+ LE edema    Data/  Recent Labs      12/17/18   0414  12/18/18   0441  12/19/18   0525   WBC  11.4*   --   10.1   HGB  9.1*   --   9.7*   HCT  28.2*   --   30.9*   MCV  86.7   --   87.0   PLT  224  208  242     Recent Labs      12/17/18   0414  12/18/18   0441  12/19/18   0525   NA  143  138  140   K  4.2  4.3  4.6   CL  103  99  101   CO2  25  26  27   GLUCOSE  293*  333*  331*   BUN  43*  47*  45*   CREATININE  2.00*  1.88*  1.71*   LABGLOM  24*  26*  29*   GFRAA  30*  32*  35*         Assessment/Plan:     1. Chronic kidney disease stage 3b - consistent with diabetic nephropathy given associated mild proteinuria. Renal function is back to baseline. 2.  Acute on chronic diastolic heart failure - Continue Bumex 1 mg p.o. daily and monitor urine output closely. Two gram sodium diet   Avoid NSAID'S. Daily weights. 3.  Systemic hypertension - Blood pressure is labile but generally adequately controlled. Lability of blood pressure in this patient is suggestive of diabetic autonomic neuropathy/Scleroderma renal disease. Continuation of hydralazine based on history of positive PHOEBE and anti-scleroderma antibody in October 2018. She will benefit from addition of ACE inhibitors if renal artery stenosis is excluded. Blood pressure medications were adjusted by cardiology yesterday. Check for orthostasis was negative  Will check renal artery Dopplers.     4.  Anemia of chronic kidney disease - Aranesp 40 mcg subcutaneously weekly [Last dose 12/13/2018]. I had prolonged discussion with patient's sister as well as nursing staff regarding renal prognosis as well as diabetic autonomic neuropathy.       Kirt Rebollar  Attending Clinical Nephrologist

## 2018-12-19 NOTE — PROGRESS NOTES
BASOPCT 0.5 11/20/2018    MONOSABS 0.60 12/19/2018    MONOSABS 0.6 11/20/2018    LYMPHSABS 1.30 12/19/2018    LYMPHSABS 1.6 11/20/2018    EOSABS 0.00 12/19/2018    EOSABS 0.2 11/20/2018    BASOSABS 0.00 12/19/2018    DIFFTYPE NOT REPORTED 12/19/2018       BMP:    Lab Results   Component Value Date     12/19/2018    K 4.6 12/19/2018     12/19/2018    CO2 27 12/19/2018    BUN 45 12/19/2018    LABALBU 3.5 11/20/2018    CREATININE 1.71 12/19/2018    CALCIUM 9.1 12/19/2018    GFRAA 35 12/19/2018    LABGLOM 29 12/19/2018    GLUCOSE 331 12/19/2018    GLUCOSE 323 11/20/2018     Renal artery duplex:   Renal/Aortic Ratio within normal limits (<3.5cm/s).  Normal kidney length.    No hemodynamically significant stenosis in the visualized portion of the    renal arteries. Limited study due to bowel gas. A/P:1. CHF  -In negative balance. Diuretic management per renal     2. HTN  -BP better controlled this afternoon. On Coreg, Cardura, Imdur and Clonidine     3. Anemia  -Hgb stable. Renal managing     4.  HC  -On Statin     Will discuss with Dr. Seng Alvarado    Electronically signed by RICK Lancaster CNP on 12/19/2018 at 5:56 PM

## 2018-12-19 NOTE — PROGRESS NOTES
FSBS noted. Insulin given per algorithm. Will recheck in 30 mins. Patient asymptomatic at this time, will continue to monitor. Results for Jackie Griffith (MRN [id number]) as of 12/19/2018 17:50   Ref.  Range 12/19/2018 16:59   POC Glucose Latest Ref Range: 65 - 105 mg/dL 466 ()

## 2018-12-20 VITALS
HEIGHT: 66 IN | HEART RATE: 59 BPM | RESPIRATION RATE: 18 BRPM | WEIGHT: 260.8 LBS | SYSTOLIC BLOOD PRESSURE: 101 MMHG | TEMPERATURE: 97.9 F | OXYGEN SATURATION: 93 % | BODY MASS INDEX: 41.91 KG/M2 | DIASTOLIC BLOOD PRESSURE: 58 MMHG

## 2018-12-20 PROBLEM — R55 SYNCOPE AND COLLAPSE: Status: ACTIVE | Noted: 2018-12-20

## 2018-12-20 PROBLEM — I10 UNCONTROLLED HYPERTENSION: Status: ACTIVE | Noted: 2018-12-20

## 2018-12-20 LAB
ANION GAP SERPL CALCULATED.3IONS-SCNC: 12 MMOL/L (ref 9–17)
BUN BLDV-MCNC: 45 MG/DL (ref 8–23)
BUN/CREAT BLD: 27 (ref 9–20)
CALCIUM SERPL-MCNC: 9.2 MG/DL (ref 8.6–10.4)
CHLORIDE BLD-SCNC: 98 MMOL/L (ref 98–107)
CO2: 27 MMOL/L (ref 20–31)
CREAT SERPL-MCNC: 1.66 MG/DL (ref 0.5–0.9)
GFR AFRICAN AMERICAN: 37 ML/MIN
GFR NON-AFRICAN AMERICAN: 30 ML/MIN
GFR SERPL CREATININE-BSD FRML MDRD: ABNORMAL ML/MIN/{1.73_M2}
GFR SERPL CREATININE-BSD FRML MDRD: ABNORMAL ML/MIN/{1.73_M2}
GLUCOSE BLD-MCNC: 186 MG/DL (ref 65–105)
GLUCOSE BLD-MCNC: 228 MG/DL (ref 65–105)
GLUCOSE BLD-MCNC: 243 MG/DL (ref 70–99)
PLATELET # BLD: 227 K/UL (ref 130–400)
POTASSIUM SERPL-SCNC: 4.2 MMOL/L (ref 3.7–5.3)
SODIUM BLD-SCNC: 137 MMOL/L (ref 135–144)

## 2018-12-20 PROCEDURE — 94640 AIRWAY INHALATION TREATMENT: CPT

## 2018-12-20 PROCEDURE — 82947 ASSAY GLUCOSE BLOOD QUANT: CPT

## 2018-12-20 PROCEDURE — 2700000000 HC OXYGEN THERAPY PER DAY

## 2018-12-20 PROCEDURE — 6360000002 HC RX W HCPCS: Performed by: INTERNAL MEDICINE

## 2018-12-20 PROCEDURE — 6370000000 HC RX 637 (ALT 250 FOR IP): Performed by: INTERNAL MEDICINE

## 2018-12-20 PROCEDURE — 94760 N-INVAS EAR/PLS OXIMETRY 1: CPT

## 2018-12-20 PROCEDURE — 80048 BASIC METABOLIC PNL TOTAL CA: CPT

## 2018-12-20 PROCEDURE — 2580000003 HC RX 258: Performed by: INTERNAL MEDICINE

## 2018-12-20 PROCEDURE — 85049 AUTOMATED PLATELET COUNT: CPT

## 2018-12-20 PROCEDURE — 36415 COLL VENOUS BLD VENIPUNCTURE: CPT

## 2018-12-20 RX ORDER — DOXAZOSIN MESYLATE 4 MG/1
4 TABLET ORAL NIGHTLY
Status: DISCONTINUED | OUTPATIENT
Start: 2018-12-20 | End: 2018-12-20 | Stop reason: HOSPADM

## 2018-12-20 RX ORDER — CLONIDINE HYDROCHLORIDE 0.3 MG/1
0.3 TABLET ORAL 3 TIMES DAILY
Qty: 60 TABLET | Refills: 3 | Status: SHIPPED | OUTPATIENT
Start: 2018-12-20 | End: 2019-05-28

## 2018-12-20 RX ORDER — BUMETANIDE 1 MG/1
3 TABLET ORAL DAILY
Status: DISCONTINUED | OUTPATIENT
Start: 2018-12-21 | End: 2018-12-20 | Stop reason: HOSPADM

## 2018-12-20 RX ORDER — PREDNISONE 1 MG/1
5 TABLET ORAL DAILY
Qty: 15 TABLET | Refills: 0 | Status: SHIPPED | OUTPATIENT
Start: 2018-12-20 | End: 2018-12-30

## 2018-12-20 RX ORDER — HYDROCODONE BITARTRATE AND ACETAMINOPHEN 5; 325 MG/1; MG/1
1 TABLET ORAL EVERY 8 HOURS PRN
Qty: 21 TABLET | Refills: 0 | Status: SHIPPED | OUTPATIENT
Start: 2018-12-20 | End: 2018-12-27

## 2018-12-20 RX ORDER — DOXAZOSIN MESYLATE 4 MG/1
4 TABLET ORAL NIGHTLY
Qty: 30 TABLET | Refills: 3 | Status: SHIPPED | OUTPATIENT
Start: 2018-12-20 | End: 2019-05-28

## 2018-12-20 RX ORDER — BUMETANIDE 1 MG/1
3 TABLET ORAL DAILY
Qty: 30 TABLET | Refills: 3 | Status: SHIPPED | OUTPATIENT
Start: 2018-12-21 | End: 2019-05-28

## 2018-12-20 RX ADMIN — ATORVASTATIN CALCIUM 40 MG: 40 TABLET, FILM COATED ORAL at 08:56

## 2018-12-20 RX ADMIN — CLONIDINE HYDROCHLORIDE 0.3 MG: 0.1 TABLET ORAL at 13:46

## 2018-12-20 RX ADMIN — IPRATROPIUM BROMIDE AND ALBUTEROL SULFATE 1 AMPULE: .5; 3 SOLUTION RESPIRATORY (INHALATION) at 07:59

## 2018-12-20 RX ADMIN — INSULIN LISPRO 6 UNITS: 100 INJECTION, SOLUTION INTRAVENOUS; SUBCUTANEOUS at 12:26

## 2018-12-20 RX ADMIN — IPRATROPIUM BROMIDE AND ALBUTEROL SULFATE 1 AMPULE: .5; 3 SOLUTION RESPIRATORY (INHALATION) at 11:45

## 2018-12-20 RX ADMIN — BUMETANIDE 1 MG: 1 TABLET ORAL at 08:56

## 2018-12-20 RX ADMIN — GABAPENTIN 100 MG: 100 CAPSULE ORAL at 08:56

## 2018-12-20 RX ADMIN — CLONIDINE HYDROCHLORIDE 0.3 MG: 0.1 TABLET ORAL at 08:56

## 2018-12-20 RX ADMIN — ISOSORBIDE MONONITRATE 30 MG: 30 TABLET ORAL at 08:56

## 2018-12-20 RX ADMIN — SKIN PROTECTANT: 33 OINTMENT TOPICAL at 08:58

## 2018-12-20 RX ADMIN — IPRATROPIUM BROMIDE AND ALBUTEROL SULFATE 1 AMPULE: .5; 3 SOLUTION RESPIRATORY (INHALATION) at 04:17

## 2018-12-20 RX ADMIN — PREDNISONE 20 MG: 20 TABLET ORAL at 08:56

## 2018-12-20 RX ADMIN — FERROUS SULFATE TAB EC 325 MG (65 MG FE EQUIVALENT) 325 MG: 325 (65 FE) TABLET DELAYED RESPONSE at 08:56

## 2018-12-20 RX ADMIN — HEPARIN SODIUM 5000 UNITS: 5000 INJECTION INTRAVENOUS; SUBCUTANEOUS at 08:56

## 2018-12-20 RX ADMIN — GABAPENTIN 100 MG: 100 CAPSULE ORAL at 13:46

## 2018-12-20 RX ADMIN — DULOXETINE HYDROCHLORIDE 30 MG: 30 CAPSULE, DELAYED RELEASE ORAL at 08:56

## 2018-12-20 RX ADMIN — Medication 10 ML: at 08:56

## 2018-12-20 RX ADMIN — INSULIN LISPRO 3 UNITS: 100 INJECTION, SOLUTION INTRAVENOUS; SUBCUTANEOUS at 08:55

## 2018-12-20 RX ADMIN — CARVEDILOL 25 MG: 25 TABLET, FILM COATED ORAL at 08:55

## 2018-12-20 RX ADMIN — ASPIRIN 81 MG: 81 TABLET, COATED ORAL at 08:56

## 2018-12-20 ASSESSMENT — PAIN SCALES - GENERAL
PAINLEVEL_OUTOF10: 0
PAINLEVEL_OUTOF10: 0

## 2018-12-20 NOTE — CARE COORDINATION
Discharge Planning    Pt has the walker from [employer] and will be leaving today. Denies need for any other services.

## 2019-01-15 LAB
ABSOLUTE IMMATURE GRANULOCYTE: 0 10*3/UL (ref 0–0.2)
BASOPHILS ABSOLUTE: 0 10*3/UL (ref 0–0.2)
BASOPHILS RELATIVE PERCENT: 0.6 % (ref 0–1)
BUN BLDV-MCNC: 47 MG/DL (ref 7–25)
CALCIUM SERPL-MCNC: 10 MG/DL (ref 8.6–10.3)
CHLORIDE BLD-SCNC: 98 MEQ/L (ref 98–107)
CO2: 30 MEQ/L (ref 21–31)
CREAT SERPL-MCNC: 2.05 MG/DL (ref 0.6–1.2)
EGFR AFRICAN AMERICAN: 29 ML/MIN/1.73SQ M
EGFR IF NONAFRICAN AMERICAN: 24 ML/MIN/1.73SQ M
EOSINOPHILS ABSOLUTE: 0.2 10*3/UL (ref 0–0.5)
EOSINOPHILS RELATIVE PERCENT: 3.3 % (ref 0–6)
GLUCOSE: 218 MG/DL (ref 70–100)
HCT VFR BLD CALC: 35.4 % (ref 36–45)
HEMOGLOBIN: 10.9 G/DL (ref 12–15)
IMMATURE GRANULOCYTES: 0.6 % (ref 0–1)
LYMPHOCYTES ABSOLUTE: 2 10*3/UL (ref 1.2–4)
LYMPHOCYTES RELATIVE PERCENT: 31.1 % (ref 20–45)
MCH RBC QN AUTO: 27 PG (ref 27–33)
MCHC RBC AUTO-ENTMCNC: 30.8 G/DL (ref 32–35)
MCV RBC AUTO: 87.6 FL (ref 82–98)
MONOCYTES ABSOLUTE: 0.5 10*3/UL (ref 0.1–1)
MONOCYTES RELATIVE PERCENT: 7.9 % (ref 5–12)
NEUTROPHILS ABSOLUTE: 3.6 10*3/UL (ref 1.6–7.6)
NEUTROPHILS RELATIVE PERCENT: 56.5 % (ref 40–72)
NUCLEATED RED BLOOD CELLS: 0 % (ref 0–0)
PDW BLD-RTO: 13.9 % (ref 11.5–15)
PLATELET # BLD: 232 10*3/UL (ref 150–400)
POTASSIUM SERPL-SCNC: 4.4 MEQ/L (ref 3.5–5.1)
RBC: 4.04 10*6/UL (ref 3.8–5)
SODIUM BLD-SCNC: 134 MEQ/L (ref 136–145)
WBC: 6.3 10*3/UL (ref 4–10.6)

## 2019-01-30 LAB
BUN BLDV-MCNC: 37 MG/DL (ref 7–25)
CALCIUM SERPL-MCNC: 9.7 MG/DL (ref 8.6–10.3)
CHLORIDE BLD-SCNC: 104 MEQ/L (ref 98–107)
CO2: 28 MEQ/L (ref 21–31)
CREAT SERPL-MCNC: 1.7 MG/DL (ref 0.6–1.2)
EGFR AFRICAN AMERICAN: 36 ML/MIN/1.73SQ M
EGFR IF NONAFRICAN AMERICAN: 30 ML/MIN/1.73SQ M
GLUCOSE: 184 MG/DL (ref 70–100)
POTASSIUM SERPL-SCNC: 3.9 MEQ/L (ref 3.5–5.1)
SODIUM BLD-SCNC: 140 MEQ/L (ref 136–145)

## 2019-04-19 ENCOUNTER — HOSPITAL ENCOUNTER (OUTPATIENT)
Age: 74
Discharge: HOME | End: 2019-04-19
Payer: MEDICARE

## 2019-04-19 LAB
ANION GAP SERPL CALCULATED.3IONS-SCNC: 14 MMOL/L (ref 9–17)
BUN BLDV-MCNC: 52 MG/DL (ref 8–23)
BUN/CREAT BLD: ABNORMAL (ref 9–20)
CALCIUM SERPL-MCNC: 9.1 MG/DL (ref 8.6–10.4)
CHLORIDE BLD-SCNC: 103 MMOL/L (ref 98–107)
CO2: 27 MMOL/L (ref 20–31)
CREAT SERPL-MCNC: 2.1 MG/DL (ref 0.5–0.9)
GFR AFRICAN AMERICAN: 28 ML/MIN
GFR NON-AFRICAN AMERICAN: 23 ML/MIN
GFR SERPL CREATININE-BSD FRML MDRD: ABNORMAL ML/MIN/{1.73_M2}
GFR SERPL CREATININE-BSD FRML MDRD: ABNORMAL ML/MIN/{1.73_M2}
GLUCOSE BLD-MCNC: 137 MG/DL (ref 70–99)
POTASSIUM SERPL-SCNC: 4.3 MMOL/L (ref 3.7–5.3)
SODIUM BLD-SCNC: 144 MMOL/L (ref 135–144)

## 2019-04-19 PROCEDURE — 80048 BASIC METABOLIC PNL TOTAL CA: CPT

## 2019-04-19 PROCEDURE — 36415 COLL VENOUS BLD VENIPUNCTURE: CPT

## 2019-06-10 ENCOUNTER — HOSPITAL ENCOUNTER (OUTPATIENT)
Age: 74
Discharge: HOME | End: 2019-06-10
Payer: MEDICARE

## 2019-06-10 LAB
ANION GAP SERPL CALCULATED.3IONS-SCNC: 17 MMOL/L (ref 9–17)
BUN BLDV-MCNC: 81 MG/DL (ref 8–23)
BUN/CREAT BLD: ABNORMAL (ref 9–20)
CALCIUM SERPL-MCNC: 10.4 MG/DL (ref 8.6–10.4)
CHLORIDE BLD-SCNC: 96 MMOL/L (ref 98–107)
CO2: 28 MMOL/L (ref 20–31)
CREAT SERPL-MCNC: 2.42 MG/DL (ref 0.5–0.9)
GFR AFRICAN AMERICAN: 24 ML/MIN
GFR NON-AFRICAN AMERICAN: 20 ML/MIN
GFR SERPL CREATININE-BSD FRML MDRD: ABNORMAL ML/MIN/{1.73_M2}
GFR SERPL CREATININE-BSD FRML MDRD: ABNORMAL ML/MIN/{1.73_M2}
GLUCOSE BLD-MCNC: 144 MG/DL (ref 70–99)
POTASSIUM SERPL-SCNC: 3.4 MMOL/L (ref 3.7–5.3)
SODIUM BLD-SCNC: 141 MMOL/L (ref 135–144)

## 2019-06-10 PROCEDURE — 36415 COLL VENOUS BLD VENIPUNCTURE: CPT

## 2019-06-10 PROCEDURE — 80048 BASIC METABOLIC PNL TOTAL CA: CPT

## 2019-06-24 ENCOUNTER — HOSPITAL ENCOUNTER (OUTPATIENT)
Age: 74
Setting detail: SPECIMEN
Discharge: HOME | End: 2019-06-24
Payer: MEDICARE

## 2019-06-24 LAB
ANION GAP SERPL CALCULATED.3IONS-SCNC: 13 MMOL/L (ref 9–17)
BUN BLDV-MCNC: 71 MG/DL (ref 8–23)
BUN/CREAT BLD: ABNORMAL (ref 9–20)
CALCIUM SERPL-MCNC: 9.6 MG/DL (ref 8.6–10.4)
CHLORIDE BLD-SCNC: 96 MMOL/L (ref 98–107)
CO2: 33 MMOL/L (ref 20–31)
CREAT SERPL-MCNC: 2.42 MG/DL (ref 0.5–0.9)
GFR AFRICAN AMERICAN: 24 ML/MIN
GFR NON-AFRICAN AMERICAN: 20 ML/MIN
GFR SERPL CREATININE-BSD FRML MDRD: ABNORMAL ML/MIN/{1.73_M2}
GFR SERPL CREATININE-BSD FRML MDRD: ABNORMAL ML/MIN/{1.73_M2}
GLUCOSE BLD-MCNC: 173 MG/DL (ref 70–99)
POTASSIUM SERPL-SCNC: 4.1 MMOL/L (ref 3.7–5.3)
SODIUM BLD-SCNC: 142 MMOL/L (ref 135–144)

## 2019-06-24 PROCEDURE — 80048 BASIC METABOLIC PNL TOTAL CA: CPT

## 2019-10-14 ENCOUNTER — HOSPITAL ENCOUNTER (OUTPATIENT)
Age: 74
Discharge: HOME | End: 2019-10-14
Payer: MEDICARE

## 2019-10-14 LAB
ABSOLUTE EOS #: 0.2 K/UL (ref 0–0.44)
ABSOLUTE IMMATURE GRANULOCYTE: <0.03 K/UL (ref 0–0.3)
ABSOLUTE LYMPH #: 1.55 K/UL (ref 1.1–3.7)
ABSOLUTE MONO #: 0.48 K/UL (ref 0.1–1.2)
ALBUMIN SERPL-MCNC: 3.7 G/DL (ref 3.5–5.2)
ALBUMIN/GLOBULIN RATIO: 0.8 (ref 1–2.5)
ALP BLD-CCNC: 83 U/L (ref 35–104)
ALT SERPL-CCNC: 8 U/L (ref 5–33)
ANION GAP SERPL CALCULATED.3IONS-SCNC: 13 MMOL/L (ref 9–17)
AST SERPL-CCNC: 20 U/L
BASOPHILS # BLD: 1 % (ref 0–2)
BASOPHILS ABSOLUTE: 0.04 K/UL (ref 0–0.2)
BILIRUB SERPL-MCNC: 0.85 MG/DL (ref 0.3–1.2)
BUN BLDV-MCNC: 25 MG/DL (ref 8–23)
BUN/CREAT BLD: ABNORMAL (ref 9–20)
CALCIUM SERPL-MCNC: 9.6 MG/DL (ref 8.6–10.4)
CHLORIDE BLD-SCNC: 108 MMOL/L (ref 98–107)
CHOLESTEROL, FASTING: 98 MG/DL
CHOLESTEROL/HDL RATIO: 2.2
CO2: 24 MMOL/L (ref 20–31)
CREAT SERPL-MCNC: 1.66 MG/DL (ref 0.5–0.9)
DIFFERENTIAL TYPE: ABNORMAL
EOSINOPHILS RELATIVE PERCENT: 4 % (ref 1–4)
GFR AFRICAN AMERICAN: 37 ML/MIN
GFR NON-AFRICAN AMERICAN: 30 ML/MIN
GFR SERPL CREATININE-BSD FRML MDRD: ABNORMAL ML/MIN/{1.73_M2}
GFR SERPL CREATININE-BSD FRML MDRD: ABNORMAL ML/MIN/{1.73_M2}
GLUCOSE BLD-MCNC: 80 MG/DL (ref 70–99)
HCT VFR BLD CALC: 32.6 % (ref 36.3–47.1)
HDLC SERPL-MCNC: 44 MG/DL
HEMOGLOBIN: 9.4 G/DL (ref 11.9–15.1)
IMMATURE GRANULOCYTES: 0 %
IRON SATURATION: 17 % (ref 20–55)
IRON: 39 UG/DL (ref 37–145)
LDL CHOLESTEROL: 38 MG/DL (ref 0–130)
LYMPHOCYTES # BLD: 28 % (ref 24–43)
MCH RBC QN AUTO: 25.5 PG (ref 25.2–33.5)
MCHC RBC AUTO-ENTMCNC: 28.8 G/DL (ref 28.4–34.8)
MCV RBC AUTO: 88.6 FL (ref 82.6–102.9)
MONOCYTES # BLD: 9 % (ref 3–12)
NRBC AUTOMATED: 0 PER 100 WBC
PDW BLD-RTO: 15.4 % (ref 11.8–14.4)
PLATELET # BLD: 195 K/UL (ref 138–453)
PLATELET ESTIMATE: ABNORMAL
PMV BLD AUTO: 10.8 FL (ref 8.1–13.5)
POTASSIUM SERPL-SCNC: 3.7 MMOL/L (ref 3.7–5.3)
RBC # BLD: 3.68 M/UL (ref 3.95–5.11)
RBC # BLD: ABNORMAL 10*6/UL
SEG NEUTROPHILS: 58 % (ref 36–65)
SEGMENTED NEUTROPHILS ABSOLUTE COUNT: 3.29 K/UL (ref 1.5–8.1)
SODIUM BLD-SCNC: 145 MMOL/L (ref 135–144)
TOTAL CK: 148 U/L (ref 26–192)
TOTAL IRON BINDING CAPACITY: 236 UG/DL (ref 250–450)
TOTAL PROTEIN: 8.1 G/DL (ref 6.4–8.3)
TRIGLYCERIDE, FASTING: 79 MG/DL
UNSATURATED IRON BINDING CAPACITY: 197 UG/DL (ref 112–347)
VLDLC SERPL CALC-MCNC: NORMAL MG/DL (ref 1–30)
WBC # BLD: 5.6 K/UL (ref 3.5–11.3)
WBC # BLD: ABNORMAL 10*3/UL

## 2019-10-14 PROCEDURE — 82550 ASSAY OF CK (CPK): CPT

## 2019-10-14 PROCEDURE — 80053 COMPREHEN METABOLIC PANEL: CPT

## 2019-10-14 PROCEDURE — 83550 IRON BINDING TEST: CPT

## 2019-10-14 PROCEDURE — 36415 COLL VENOUS BLD VENIPUNCTURE: CPT

## 2019-10-14 PROCEDURE — 80061 LIPID PANEL: CPT

## 2019-10-14 PROCEDURE — 83540 ASSAY OF IRON: CPT

## 2019-10-14 PROCEDURE — 85025 COMPLETE CBC W/AUTO DIFF WBC: CPT

## 2019-10-25 ENCOUNTER — HOSPITAL ENCOUNTER (INPATIENT)
Age: 74
LOS: 6 days | Discharge: NURSING, REHAB OR LONG-TERM CARE | DRG: 291 | End: 2019-10-31
Attending: EMERGENCY MEDICINE | Admitting: INTERNAL MEDICINE
Payer: MEDICARE

## 2019-10-25 ENCOUNTER — APPOINTMENT (OUTPATIENT)
Dept: GENERAL RADIOLOGY | Age: 74
DRG: 291 | End: 2019-10-25
Payer: MEDICARE

## 2019-10-25 DIAGNOSIS — G89.29 OTHER CHRONIC PAIN: Primary | ICD-10-CM

## 2019-10-25 DIAGNOSIS — I50.9 ACUTE ON CHRONIC CONGESTIVE HEART FAILURE, UNSPECIFIED HEART FAILURE TYPE (HCC): ICD-10-CM

## 2019-10-25 DIAGNOSIS — N39.0 E. COLI UTI (URINARY TRACT INFECTION): ICD-10-CM

## 2019-10-25 DIAGNOSIS — R06.02 SHORTNESS OF BREATH: ICD-10-CM

## 2019-10-25 DIAGNOSIS — B96.20 E. COLI UTI (URINARY TRACT INFECTION): ICD-10-CM

## 2019-10-25 PROBLEM — D64.9 NORMOCYTIC NORMOCHROMIC ANEMIA: Status: ACTIVE | Noted: 2019-10-25

## 2019-10-25 PROBLEM — R77.8 ELEVATED TROPONIN: Status: ACTIVE | Noted: 2019-10-25

## 2019-10-25 LAB
-: ABNORMAL
ABSOLUTE EOS #: 0.2 K/UL (ref 0–0.44)
ABSOLUTE IMMATURE GRANULOCYTE: 0.01 K/UL (ref 0–0.3)
ABSOLUTE LYMPH #: 1.23 K/UL (ref 1.1–3.7)
ABSOLUTE MONO #: 0.43 K/UL (ref 0.1–1.2)
AMORPHOUS: ABNORMAL
ANION GAP SERPL CALCULATED.3IONS-SCNC: 14 MMOL/L (ref 9–17)
BACTERIA: ABNORMAL
BASOPHILS # BLD: 1 % (ref 0–2)
BASOPHILS ABSOLUTE: 0.04 K/UL (ref 0–0.2)
BILIRUBIN URINE: NEGATIVE
BNP INTERPRETATION: ABNORMAL
BUN BLDV-MCNC: 25 MG/DL (ref 8–23)
BUN/CREAT BLD: 13 (ref 9–20)
CALCIUM SERPL-MCNC: 9.3 MG/DL (ref 8.6–10.4)
CASTS UA: ABNORMAL /LPF
CHLORIDE BLD-SCNC: 105 MMOL/L (ref 98–107)
CO2: 25 MMOL/L (ref 20–31)
COLOR: YELLOW
COMMENT UA: ABNORMAL
CREAT SERPL-MCNC: 1.98 MG/DL (ref 0.5–0.9)
CRYSTALS, UA: ABNORMAL /HPF
DIFFERENTIAL TYPE: ABNORMAL
EOSINOPHILS RELATIVE PERCENT: 3 % (ref 1–4)
EPITHELIAL CELLS UA: ABNORMAL /HPF (ref 0–5)
GFR AFRICAN AMERICAN: 30 ML/MIN
GFR NON-AFRICAN AMERICAN: 25 ML/MIN
GFR SERPL CREATININE-BSD FRML MDRD: ABNORMAL ML/MIN/{1.73_M2}
GFR SERPL CREATININE-BSD FRML MDRD: ABNORMAL ML/MIN/{1.73_M2}
GLUCOSE BLD-MCNC: 82 MG/DL (ref 70–99)
GLUCOSE URINE: NEGATIVE
HCT VFR BLD CALC: 29 % (ref 36.3–47.1)
HEMOGLOBIN: 8.6 G/DL (ref 11.9–15.1)
IMMATURE GRANULOCYTES: 0 %
KETONES, URINE: NEGATIVE
LEUKOCYTE ESTERASE, URINE: ABNORMAL
LYMPHOCYTES # BLD: 21 % (ref 24–43)
MAGNESIUM: 1.7 MG/DL (ref 1.6–2.6)
MCH RBC QN AUTO: 25.9 PG (ref 25.2–33.5)
MCHC RBC AUTO-ENTMCNC: 29.7 G/DL (ref 28.4–34.8)
MCV RBC AUTO: 87.3 FL (ref 82.6–102.9)
MONOCYTES # BLD: 7 % (ref 3–12)
MUCUS: ABNORMAL
NITRITE, URINE: NEGATIVE
NRBC AUTOMATED: 0 PER 100 WBC
OTHER OBSERVATIONS UA: ABNORMAL
PDW BLD-RTO: 15.7 % (ref 11.8–14.4)
PH UA: 6 (ref 5–8)
PLATELET # BLD: 179 K/UL (ref 138–453)
PLATELET ESTIMATE: ABNORMAL
PMV BLD AUTO: 10.6 FL (ref 8.1–13.5)
POTASSIUM SERPL-SCNC: 3.4 MMOL/L (ref 3.7–5.3)
PRO-BNP: 4237 PG/ML
PROTEIN UA: NEGATIVE
RBC # BLD: 3.32 M/UL (ref 3.95–5.11)
RBC # BLD: ABNORMAL 10*6/UL
RBC UA: ABNORMAL /HPF (ref 0–2)
RENAL EPITHELIAL, UA: ABNORMAL /HPF
SEG NEUTROPHILS: 68 % (ref 36–65)
SEGMENTED NEUTROPHILS ABSOLUTE COUNT: 3.9 K/UL (ref 1.5–8.1)
SODIUM BLD-SCNC: 144 MMOL/L (ref 135–144)
SPECIFIC GRAVITY UA: 1.01 (ref 1–1.03)
TRICHOMONAS: ABNORMAL
TROPONIN INTERP: ABNORMAL
TROPONIN T: ABNORMAL NG/ML
TROPONIN, HIGH SENSITIVITY: 56 NG/L (ref 0–14)
TURBIDITY: CLEAR
URINE HGB: ABNORMAL
UROBILINOGEN, URINE: NORMAL
WBC # BLD: 5.8 K/UL (ref 3.5–11.3)
WBC # BLD: ABNORMAL 10*3/UL
WBC UA: ABNORMAL /HPF (ref 0–5)
YEAST: ABNORMAL

## 2019-10-25 PROCEDURE — 96374 THER/PROPH/DIAG INJ IV PUSH: CPT

## 2019-10-25 PROCEDURE — 80048 BASIC METABOLIC PNL TOTAL CA: CPT

## 2019-10-25 PROCEDURE — 2700000000 HC OXYGEN THERAPY PER DAY

## 2019-10-25 PROCEDURE — 93005 ELECTROCARDIOGRAM TRACING: CPT | Performed by: EMERGENCY MEDICINE

## 2019-10-25 PROCEDURE — 2000000000 HC ICU R&B

## 2019-10-25 PROCEDURE — 81001 URINALYSIS AUTO W/SCOPE: CPT

## 2019-10-25 PROCEDURE — 85025 COMPLETE CBC W/AUTO DIFF WBC: CPT

## 2019-10-25 PROCEDURE — 87086 URINE CULTURE/COLONY COUNT: CPT

## 2019-10-25 PROCEDURE — 94640 AIRWAY INHALATION TREATMENT: CPT

## 2019-10-25 PROCEDURE — 71046 X-RAY EXAM CHEST 2 VIEWS: CPT

## 2019-10-25 PROCEDURE — 6360000002 HC RX W HCPCS: Performed by: EMERGENCY MEDICINE

## 2019-10-25 PROCEDURE — 83880 ASSAY OF NATRIURETIC PEPTIDE: CPT

## 2019-10-25 PROCEDURE — 94761 N-INVAS EAR/PLS OXIMETRY MLT: CPT

## 2019-10-25 PROCEDURE — 2060000000 HC ICU INTERMEDIATE R&B

## 2019-10-25 PROCEDURE — 84484 ASSAY OF TROPONIN QUANT: CPT

## 2019-10-25 PROCEDURE — 87186 SC STD MICRODIL/AGAR DIL: CPT

## 2019-10-25 PROCEDURE — 87088 URINE BACTERIA CULTURE: CPT

## 2019-10-25 PROCEDURE — 83735 ASSAY OF MAGNESIUM: CPT

## 2019-10-25 PROCEDURE — 99285 EMERGENCY DEPT VISIT HI MDM: CPT

## 2019-10-25 RX ORDER — INSULIN GLARGINE 100 [IU]/ML
30 INJECTION, SOLUTION SUBCUTANEOUS NIGHTLY
Status: DISCONTINUED | OUTPATIENT
Start: 2019-10-26 | End: 2019-10-31 | Stop reason: HOSPADM

## 2019-10-25 RX ORDER — ASPIRIN 81 MG/1
81 TABLET ORAL DAILY
Status: DISCONTINUED | OUTPATIENT
Start: 2019-10-26 | End: 2019-10-31 | Stop reason: HOSPADM

## 2019-10-25 RX ORDER — ALBUTEROL SULFATE 90 UG/1
2 AEROSOL, METERED RESPIRATORY (INHALATION)
Status: DISCONTINUED | OUTPATIENT
Start: 2019-10-25 | End: 2019-10-26

## 2019-10-25 RX ORDER — FUROSEMIDE 10 MG/ML
60 INJECTION INTRAMUSCULAR; INTRAVENOUS ONCE
Status: COMPLETED | OUTPATIENT
Start: 2019-10-25 | End: 2019-10-25

## 2019-10-25 RX ORDER — SPIRONOLACTONE 25 MG/1
25 TABLET ORAL DAILY
Status: DISCONTINUED | OUTPATIENT
Start: 2019-10-26 | End: 2019-10-31 | Stop reason: HOSPADM

## 2019-10-25 RX ORDER — CARVEDILOL 25 MG/1
25 TABLET ORAL 2 TIMES DAILY WITH MEALS
Status: DISCONTINUED | OUTPATIENT
Start: 2019-10-26 | End: 2019-10-31 | Stop reason: HOSPADM

## 2019-10-25 RX ORDER — IPRATROPIUM BROMIDE AND ALBUTEROL SULFATE 2.5; .5 MG/3ML; MG/3ML
1 SOLUTION RESPIRATORY (INHALATION) 4 TIMES DAILY
Status: DISCONTINUED | OUTPATIENT
Start: 2019-10-26 | End: 2019-10-26

## 2019-10-25 RX ORDER — BUMETANIDE 0.25 MG/ML
1 INJECTION, SOLUTION INTRAMUSCULAR; INTRAVENOUS EVERY 12 HOURS
Status: DISCONTINUED | OUTPATIENT
Start: 2019-10-26 | End: 2019-10-27

## 2019-10-25 RX ORDER — ISOSORBIDE MONONITRATE 30 MG/1
30 TABLET, EXTENDED RELEASE ORAL DAILY
Status: DISCONTINUED | OUTPATIENT
Start: 2019-10-26 | End: 2019-10-31 | Stop reason: HOSPADM

## 2019-10-25 RX ORDER — OXYCODONE HCL 10 MG/1
10 TABLET, FILM COATED, EXTENDED RELEASE ORAL EVERY 12 HOURS
Status: ON HOLD | COMMUNITY
End: 2019-10-26

## 2019-10-25 RX ORDER — GLIPIZIDE 10 MG/1
5 TABLET ORAL DAILY
Status: DISCONTINUED | OUTPATIENT
Start: 2019-10-26 | End: 2019-10-31 | Stop reason: HOSPADM

## 2019-10-25 RX ORDER — DULOXETIN HYDROCHLORIDE 30 MG/1
30 CAPSULE, DELAYED RELEASE ORAL DAILY
Status: DISCONTINUED | OUTPATIENT
Start: 2019-10-26 | End: 2019-10-31 | Stop reason: HOSPADM

## 2019-10-25 RX ORDER — DOXAZOSIN MESYLATE 4 MG/1
8 TABLET ORAL 2 TIMES DAILY
Status: DISCONTINUED | OUTPATIENT
Start: 2019-10-26 | End: 2019-10-29

## 2019-10-25 RX ORDER — IPRATROPIUM BROMIDE AND ALBUTEROL SULFATE 2.5; .5 MG/3ML; MG/3ML
1 SOLUTION RESPIRATORY (INHALATION)
Status: DISCONTINUED | OUTPATIENT
Start: 2019-10-26 | End: 2019-10-31 | Stop reason: HOSPADM

## 2019-10-25 RX ORDER — SPIRONOLACTONE 25 MG/1
25 TABLET ORAL DAILY
COMMUNITY

## 2019-10-25 RX ORDER — ATORVASTATIN CALCIUM 40 MG/1
40 TABLET, FILM COATED ORAL DAILY
Status: DISCONTINUED | OUTPATIENT
Start: 2019-10-26 | End: 2019-10-31 | Stop reason: HOSPADM

## 2019-10-25 RX ORDER — ALBUTEROL SULFATE 2.5 MG/3ML
5 SOLUTION RESPIRATORY (INHALATION)
Status: DISCONTINUED | OUTPATIENT
Start: 2019-10-25 | End: 2019-10-26

## 2019-10-25 RX ORDER — OXYCODONE HCL 10 MG/1
10 TABLET, FILM COATED, EXTENDED RELEASE ORAL EVERY 12 HOURS
Status: DISCONTINUED | OUTPATIENT
Start: 2019-10-26 | End: 2019-10-31 | Stop reason: HOSPADM

## 2019-10-25 RX ORDER — GENTAMICIN SULFATE 1 MG/G
OINTMENT TOPICAL DAILY
Status: DISCONTINUED | OUTPATIENT
Start: 2019-10-26 | End: 2019-10-26

## 2019-10-25 RX ADMIN — ALBUTEROL SULFATE 5 MG: 5 SOLUTION RESPIRATORY (INHALATION) at 21:29

## 2019-10-25 RX ADMIN — FUROSEMIDE 60 MG: 10 INJECTION, SOLUTION INTRAMUSCULAR; INTRAVENOUS at 22:17

## 2019-10-25 RX ADMIN — ALBUTEROL SULFATE 5 MG: 5 SOLUTION RESPIRATORY (INHALATION) at 21:20

## 2019-10-25 ASSESSMENT — PAIN DESCRIPTION - LOCATION: LOCATION: LEG

## 2019-10-25 ASSESSMENT — PAIN SCALES - GENERAL
PAINLEVEL_OUTOF10: 8
PAINLEVEL_OUTOF10: 4

## 2019-10-25 ASSESSMENT — PAIN - FUNCTIONAL ASSESSMENT: PAIN_FUNCTIONAL_ASSESSMENT: PREVENTS OR INTERFERES SOME ACTIVE ACTIVITIES AND ADLS

## 2019-10-25 ASSESSMENT — PAIN DESCRIPTION - PROGRESSION: CLINICAL_PROGRESSION: GRADUALLY WORSENING

## 2019-10-25 ASSESSMENT — PAIN DESCRIPTION - DESCRIPTORS: DESCRIPTORS: ACHING;CONSTANT;DISCOMFORT;DULL

## 2019-10-25 ASSESSMENT — PAIN DESCRIPTION - ONSET: ONSET: ON-GOING

## 2019-10-25 ASSESSMENT — PAIN DESCRIPTION - PAIN TYPE: TYPE: CHRONIC PAIN

## 2019-10-25 ASSESSMENT — PAIN DESCRIPTION - ORIENTATION: ORIENTATION: RIGHT;LEFT;LOWER

## 2019-10-25 ASSESSMENT — PAIN DESCRIPTION - FREQUENCY: FREQUENCY: CONTINUOUS

## 2019-10-26 PROBLEM — R41.89 COGNITIVE IMPAIRMENT: Status: ACTIVE | Noted: 2019-10-26

## 2019-10-26 LAB
ABSOLUTE RETIC #: 0.04 M/UL (ref 0.03–0.08)
AMMONIA: 28 UMOL/L (ref 11–51)
ANION GAP SERPL CALCULATED.3IONS-SCNC: 12 MMOL/L (ref 9–17)
BUN BLDV-MCNC: 23 MG/DL (ref 8–23)
BUN/CREAT BLD: 12 (ref 9–20)
CALCIUM IONIZED: 1.26 MMOL/L (ref 1.13–1.33)
CALCIUM SERPL-MCNC: 9 MG/DL (ref 8.6–10.4)
CHLORIDE BLD-SCNC: 105 MMOL/L (ref 98–107)
CO2: 27 MMOL/L (ref 20–31)
CREAT SERPL-MCNC: 1.91 MG/DL (ref 0.5–0.9)
EKG ATRIAL RATE: 67 BPM
EKG P AXIS: 70 DEGREES
EKG P-R INTERVAL: 178 MS
EKG Q-T INTERVAL: 414 MS
EKG QRS DURATION: 96 MS
EKG QTC CALCULATION (BAZETT): 437 MS
EKG R AXIS: -36 DEGREES
EKG T AXIS: 53 DEGREES
EKG VENTRICULAR RATE: 67 BPM
FERRITIN: 198 UG/L (ref 13–150)
FIO2: 28
FOLATE: 7.3 NG/ML
GFR AFRICAN AMERICAN: 31 ML/MIN
GFR NON-AFRICAN AMERICAN: 26 ML/MIN
GFR SERPL CREATININE-BSD FRML MDRD: ABNORMAL ML/MIN/{1.73_M2}
GFR SERPL CREATININE-BSD FRML MDRD: ABNORMAL ML/MIN/{1.73_M2}
GLUCOSE BLD-MCNC: 105 MG/DL (ref 70–99)
GLUCOSE BLD-MCNC: 116 MG/DL (ref 65–105)
GLUCOSE BLD-MCNC: 144 MG/DL (ref 65–105)
GLUCOSE BLD-MCNC: 62 MG/DL (ref 65–105)
GLUCOSE BLD-MCNC: 66 MG/DL (ref 65–105)
GLUCOSE BLD-MCNC: 68 MG/DL (ref 65–105)
GLUCOSE BLD-MCNC: 91 MG/DL (ref 65–105)
HCO3, MIXED: 26.8 MMOL/L (ref 23–29)
IMMATURE RETIC FRACT: 14.6 % (ref 2.7–18.3)
IRON SATURATION: 16 % (ref 20–55)
IRON: 36 UG/DL (ref 37–145)
MAGNESIUM: 1.7 MG/DL (ref 1.6–2.6)
NEGATIVE BASE EXCESS, MIXED: ABNORMAL (ref 0–2)
O2 DEVICE/FLOW/%: ABNORMAL
O2 SAT, MIXED: 81 %
PATIENT TEMP: ABNORMAL
PCO2 MIXED: 41 MM HG (ref 42–52)
PH, MIXED: 7.43 (ref 7.31–7.41)
PO2 MIXED: 44 MM HG (ref 35–45)
POC PCO2 TEMP: ABNORMAL MM HG
POC PH TEMP: ABNORMAL
POC PO2 TEMP: ABNORMAL MM HG
POSITIVE BASE EXCESS, MIXED: 2 (ref 0–2)
POTASSIUM SERPL-SCNC: 2.9 MMOL/L (ref 3.7–5.3)
POTASSIUM SERPL-SCNC: 3.6 MMOL/L (ref 3.7–5.3)
RETIC %: 1.2 % (ref 0.5–1.9)
RETIC HEMOGLOBIN: 26.2 PG (ref 28.2–35.7)
SODIUM BLD-SCNC: 144 MMOL/L (ref 135–144)
TCO2 CALC MIXED: 28 MMOL/L (ref 24–30)
THYROXINE, FREE: 1.23 NG/DL (ref 0.93–1.7)
TOTAL IRON BINDING CAPACITY: 224 UG/DL (ref 250–450)
TROPONIN INTERP: ABNORMAL
TROPONIN T: ABNORMAL NG/ML
TROPONIN, HIGH SENSITIVITY: 57 NG/L (ref 0–14)
TROPONIN, HIGH SENSITIVITY: 58 NG/L (ref 0–14)
TROPONIN, HIGH SENSITIVITY: 58 NG/L (ref 0–14)
TSH SERPL DL<=0.05 MIU/L-ACNC: 1.47 MIU/L (ref 0.3–5)
UNSATURATED IRON BINDING CAPACITY: 188 UG/DL (ref 112–347)
VITAMIN B-12: 606 PG/ML (ref 232–1245)

## 2019-10-26 PROCEDURE — 2060000000 HC ICU INTERMEDIATE R&B

## 2019-10-26 PROCEDURE — 84443 ASSAY THYROID STIM HORMONE: CPT

## 2019-10-26 PROCEDURE — 84484 ASSAY OF TROPONIN QUANT: CPT

## 2019-10-26 PROCEDURE — 6360000002 HC RX W HCPCS: Performed by: INTERNAL MEDICINE

## 2019-10-26 PROCEDURE — 87205 SMEAR GRAM STAIN: CPT

## 2019-10-26 PROCEDURE — 2580000003 HC RX 258: Performed by: INTERNAL MEDICINE

## 2019-10-26 PROCEDURE — 36600 WITHDRAWAL OF ARTERIAL BLOOD: CPT

## 2019-10-26 PROCEDURE — 6370000000 HC RX 637 (ALT 250 FOR IP): Performed by: INTERNAL MEDICINE

## 2019-10-26 PROCEDURE — 82947 ASSAY GLUCOSE BLOOD QUANT: CPT

## 2019-10-26 PROCEDURE — 83540 ASSAY OF IRON: CPT

## 2019-10-26 PROCEDURE — 83036 HEMOGLOBIN GLYCOSYLATED A1C: CPT

## 2019-10-26 PROCEDURE — 82140 ASSAY OF AMMONIA: CPT

## 2019-10-26 PROCEDURE — 82746 ASSAY OF FOLIC ACID SERUM: CPT

## 2019-10-26 PROCEDURE — 84132 ASSAY OF SERUM POTASSIUM: CPT

## 2019-10-26 PROCEDURE — 93306 TTE W/DOPPLER COMPLETE: CPT

## 2019-10-26 PROCEDURE — 83550 IRON BINDING TEST: CPT

## 2019-10-26 PROCEDURE — 87040 BLOOD CULTURE FOR BACTERIA: CPT

## 2019-10-26 PROCEDURE — 36415 COLL VENOUS BLD VENIPUNCTURE: CPT

## 2019-10-26 PROCEDURE — 86403 PARTICLE AGGLUT ANTBDY SCRN: CPT

## 2019-10-26 PROCEDURE — 93010 ELECTROCARDIOGRAM REPORT: CPT | Performed by: INTERNAL MEDICINE

## 2019-10-26 PROCEDURE — 84439 ASSAY OF FREE THYROXINE: CPT

## 2019-10-26 PROCEDURE — 93970 EXTREMITY STUDY: CPT

## 2019-10-26 PROCEDURE — 83735 ASSAY OF MAGNESIUM: CPT

## 2019-10-26 PROCEDURE — 6370000000 HC RX 637 (ALT 250 FOR IP): Performed by: EMERGENCY MEDICINE

## 2019-10-26 PROCEDURE — 94761 N-INVAS EAR/PLS OXIMETRY MLT: CPT

## 2019-10-26 PROCEDURE — 82728 ASSAY OF FERRITIN: CPT

## 2019-10-26 PROCEDURE — 2700000000 HC OXYGEN THERAPY PER DAY

## 2019-10-26 PROCEDURE — 82607 VITAMIN B-12: CPT

## 2019-10-26 PROCEDURE — 94640 AIRWAY INHALATION TREATMENT: CPT

## 2019-10-26 PROCEDURE — 2500000003 HC RX 250 WO HCPCS: Performed by: INTERNAL MEDICINE

## 2019-10-26 PROCEDURE — 82330 ASSAY OF CALCIUM: CPT

## 2019-10-26 PROCEDURE — 87070 CULTURE OTHR SPECIMN AEROBIC: CPT

## 2019-10-26 PROCEDURE — 80048 BASIC METABOLIC PNL TOTAL CA: CPT

## 2019-10-26 PROCEDURE — 82803 BLOOD GASES ANY COMBINATION: CPT

## 2019-10-26 PROCEDURE — 87186 SC STD MICRODIL/AGAR DIL: CPT

## 2019-10-26 PROCEDURE — 85045 AUTOMATED RETICULOCYTE COUNT: CPT

## 2019-10-26 RX ORDER — POTASSIUM CHLORIDE 20 MEQ/1
20 TABLET, EXTENDED RELEASE ORAL ONCE
Status: COMPLETED | OUTPATIENT
Start: 2019-10-26 | End: 2019-10-26

## 2019-10-26 RX ORDER — DEXTROSE MONOHYDRATE 25 G/50ML
12.5 INJECTION, SOLUTION INTRAVENOUS PRN
Status: DISCONTINUED | OUTPATIENT
Start: 2019-10-26 | End: 2019-10-31 | Stop reason: HOSPADM

## 2019-10-26 RX ORDER — GENTAMICIN SULFATE 1 MG/G
OINTMENT TOPICAL DAILY
Status: DISCONTINUED | OUTPATIENT
Start: 2019-10-27 | End: 2019-10-31 | Stop reason: HOSPADM

## 2019-10-26 RX ORDER — OXYCODONE HYDROCHLORIDE 10 MG/1
10 TABLET ORAL EVERY 6 HOURS PRN
Status: ON HOLD | COMMUNITY
End: 2019-10-30 | Stop reason: HOSPADM

## 2019-10-26 RX ORDER — DEXTROSE MONOHYDRATE 50 MG/ML
100 INJECTION, SOLUTION INTRAVENOUS PRN
Status: DISCONTINUED | OUTPATIENT
Start: 2019-10-26 | End: 2019-10-31 | Stop reason: HOSPADM

## 2019-10-26 RX ORDER — ALBUTEROL SULFATE 2.5 MG/3ML
2.5 SOLUTION RESPIRATORY (INHALATION) EVERY 4 HOURS PRN
Status: DISCONTINUED | OUTPATIENT
Start: 2019-10-26 | End: 2019-10-31 | Stop reason: HOSPADM

## 2019-10-26 RX ORDER — POTASSIUM CHLORIDE 20 MEQ/1
40 TABLET, EXTENDED RELEASE ORAL 2 TIMES DAILY
Status: DISCONTINUED | OUTPATIENT
Start: 2019-10-26 | End: 2019-10-27

## 2019-10-26 RX ORDER — GENTAMICIN SULFATE 1 MG/G
OINTMENT TOPICAL DAILY PRN
Status: DISCONTINUED | OUTPATIENT
Start: 2019-10-26 | End: 2019-10-26

## 2019-10-26 RX ORDER — NICOTINE POLACRILEX 4 MG
15 LOZENGE BUCCAL PRN
Status: DISCONTINUED | OUTPATIENT
Start: 2019-10-26 | End: 2019-10-31 | Stop reason: HOSPADM

## 2019-10-26 RX ADMIN — ALBUTEROL SULFATE 2.5 MG: 2.5 SOLUTION RESPIRATORY (INHALATION) at 05:50

## 2019-10-26 RX ADMIN — ISOSORBIDE MONONITRATE 30 MG: 30 TABLET ORAL at 09:36

## 2019-10-26 RX ADMIN — BUMETANIDE 1 MG: 0.25 INJECTION INTRAMUSCULAR; INTRAVENOUS at 11:57

## 2019-10-26 RX ADMIN — OXYCODONE HYDROCHLORIDE 10 MG: 10 TABLET, FILM COATED, EXTENDED RELEASE ORAL at 11:58

## 2019-10-26 RX ADMIN — GLIPIZIDE 2.5 MG: 10 TABLET ORAL at 09:34

## 2019-10-26 RX ADMIN — ASPIRIN 81 MG: 81 TABLET, COATED ORAL at 09:36

## 2019-10-26 RX ADMIN — POTASSIUM CHLORIDE 40 MEQ: 20 TABLET, EXTENDED RELEASE ORAL at 09:36

## 2019-10-26 RX ADMIN — BUMETANIDE 1 MG: 0.25 INJECTION INTRAMUSCULAR; INTRAVENOUS at 01:20

## 2019-10-26 RX ADMIN — CEFTRIAXONE 1 G: 1 INJECTION, POWDER, FOR SOLUTION INTRAMUSCULAR; INTRAVENOUS at 01:20

## 2019-10-26 RX ADMIN — POTASSIUM CHLORIDE 40 MEQ: 20 TABLET, EXTENDED RELEASE ORAL at 20:22

## 2019-10-26 RX ADMIN — IPRATROPIUM BROMIDE AND ALBUTEROL SULFATE 1 AMPULE: .5; 3 SOLUTION RESPIRATORY (INHALATION) at 09:48

## 2019-10-26 RX ADMIN — DOXAZOSIN MESYLATE 8 MG: 4 TABLET ORAL at 21:42

## 2019-10-26 RX ADMIN — IPRATROPIUM BROMIDE AND ALBUTEROL SULFATE 1 AMPULE: .5; 3 SOLUTION RESPIRATORY (INHALATION) at 14:06

## 2019-10-26 RX ADMIN — CARVEDILOL 25 MG: 25 TABLET, FILM COATED ORAL at 09:36

## 2019-10-26 RX ADMIN — DULOXETINE HYDROCHLORIDE 30 MG: 30 CAPSULE, DELAYED RELEASE ORAL at 09:36

## 2019-10-26 RX ADMIN — OXYCODONE HYDROCHLORIDE 10 MG: 10 TABLET, FILM COATED, EXTENDED RELEASE ORAL at 01:21

## 2019-10-26 RX ADMIN — ATORVASTATIN CALCIUM 40 MG: 40 TABLET, FILM COATED ORAL at 09:36

## 2019-10-26 RX ADMIN — IPRATROPIUM BROMIDE AND ALBUTEROL SULFATE 1 AMPULE: .5; 3 SOLUTION RESPIRATORY (INHALATION) at 19:57

## 2019-10-26 RX ADMIN — POTASSIUM CHLORIDE 20 MEQ: 20 TABLET, EXTENDED RELEASE ORAL at 11:57

## 2019-10-26 RX ADMIN — SPIRONOLACTONE 25 MG: 25 TABLET ORAL at 09:36

## 2019-10-26 ASSESSMENT — PAIN DESCRIPTION - DESCRIPTORS
DESCRIPTORS: ACHING;CONSTANT;DISCOMFORT
DESCRIPTORS: ACHING;CONSTANT;DISCOMFORT

## 2019-10-26 ASSESSMENT — PAIN SCALES - GENERAL
PAINLEVEL_OUTOF10: 4
PAINLEVEL_OUTOF10: 6
PAINLEVEL_OUTOF10: 4
PAINLEVEL_OUTOF10: 7
PAINLEVEL_OUTOF10: 0
PAINLEVEL_OUTOF10: 4

## 2019-10-26 ASSESSMENT — PAIN DESCRIPTION - ORIENTATION
ORIENTATION: RIGHT;LEFT
ORIENTATION: RIGHT;LEFT;LOWER
ORIENTATION: RIGHT;LEFT
ORIENTATION: RIGHT;LEFT;LOWER

## 2019-10-26 ASSESSMENT — PAIN DESCRIPTION - PROGRESSION
CLINICAL_PROGRESSION: GRADUALLY WORSENING
CLINICAL_PROGRESSION: GRADUALLY IMPROVING
CLINICAL_PROGRESSION: GRADUALLY WORSENING

## 2019-10-26 ASSESSMENT — PAIN DESCRIPTION - FREQUENCY
FREQUENCY: CONTINUOUS
FREQUENCY: CONTINUOUS

## 2019-10-26 ASSESSMENT — PAIN DESCRIPTION - LOCATION
LOCATION: LEG

## 2019-10-26 ASSESSMENT — PAIN - FUNCTIONAL ASSESSMENT
PAIN_FUNCTIONAL_ASSESSMENT: PREVENTS OR INTERFERES SOME ACTIVE ACTIVITIES AND ADLS
PAIN_FUNCTIONAL_ASSESSMENT: PREVENTS OR INTERFERES SOME ACTIVE ACTIVITIES AND ADLS

## 2019-10-26 ASSESSMENT — PAIN DESCRIPTION - PAIN TYPE
TYPE: CHRONIC PAIN

## 2019-10-26 ASSESSMENT — PAIN DESCRIPTION - ONSET
ONSET: ON-GOING
ONSET: ON-GOING

## 2019-10-27 ENCOUNTER — APPOINTMENT (OUTPATIENT)
Dept: CT IMAGING | Age: 74
DRG: 291 | End: 2019-10-27
Payer: MEDICARE

## 2019-10-27 ENCOUNTER — APPOINTMENT (OUTPATIENT)
Dept: GENERAL RADIOLOGY | Age: 74
DRG: 291 | End: 2019-10-27
Payer: MEDICARE

## 2019-10-27 LAB
ANION GAP SERPL CALCULATED.3IONS-SCNC: 11 MMOL/L (ref 9–17)
BUN BLDV-MCNC: 28 MG/DL (ref 8–23)
BUN/CREAT BLD: 13 (ref 9–20)
CALCIUM SERPL-MCNC: 8.7 MG/DL (ref 8.6–10.4)
CHLORIDE BLD-SCNC: 104 MMOL/L (ref 98–107)
CO2: 28 MMOL/L (ref 20–31)
CREAT SERPL-MCNC: 2.19 MG/DL (ref 0.5–0.9)
GFR AFRICAN AMERICAN: 27 ML/MIN
GFR NON-AFRICAN AMERICAN: 22 ML/MIN
GFR SERPL CREATININE-BSD FRML MDRD: ABNORMAL ML/MIN/{1.73_M2}
GFR SERPL CREATININE-BSD FRML MDRD: ABNORMAL ML/MIN/{1.73_M2}
GLUCOSE BLD-MCNC: 110 MG/DL (ref 70–99)
GLUCOSE BLD-MCNC: 122 MG/DL (ref 65–105)
GLUCOSE BLD-MCNC: 141 MG/DL (ref 65–105)
GLUCOSE BLD-MCNC: 145 MG/DL (ref 65–105)
POTASSIUM SERPL-SCNC: 4.1 MMOL/L (ref 3.7–5.3)
SODIUM BLD-SCNC: 143 MMOL/L (ref 135–144)
URIC ACID: 12.1 MG/DL (ref 2.4–5.7)

## 2019-10-27 PROCEDURE — 36415 COLL VENOUS BLD VENIPUNCTURE: CPT

## 2019-10-27 PROCEDURE — 70450 CT HEAD/BRAIN W/O DYE: CPT

## 2019-10-27 PROCEDURE — 2580000003 HC RX 258: Performed by: INTERNAL MEDICINE

## 2019-10-27 PROCEDURE — 80048 BASIC METABOLIC PNL TOTAL CA: CPT

## 2019-10-27 PROCEDURE — 73560 X-RAY EXAM OF KNEE 1 OR 2: CPT

## 2019-10-27 PROCEDURE — 2700000000 HC OXYGEN THERAPY PER DAY

## 2019-10-27 PROCEDURE — 6360000002 HC RX W HCPCS: Performed by: INTERNAL MEDICINE

## 2019-10-27 PROCEDURE — 94761 N-INVAS EAR/PLS OXIMETRY MLT: CPT

## 2019-10-27 PROCEDURE — 6370000000 HC RX 637 (ALT 250 FOR IP): Performed by: EMERGENCY MEDICINE

## 2019-10-27 PROCEDURE — 2060000000 HC ICU INTERMEDIATE R&B

## 2019-10-27 PROCEDURE — 2500000003 HC RX 250 WO HCPCS: Performed by: INTERNAL MEDICINE

## 2019-10-27 PROCEDURE — 94640 AIRWAY INHALATION TREATMENT: CPT

## 2019-10-27 PROCEDURE — 6370000000 HC RX 637 (ALT 250 FOR IP): Performed by: INTERNAL MEDICINE

## 2019-10-27 PROCEDURE — 82947 ASSAY GLUCOSE BLOOD QUANT: CPT

## 2019-10-27 PROCEDURE — 84550 ASSAY OF BLOOD/URIC ACID: CPT

## 2019-10-27 RX ORDER — ONDANSETRON 4 MG/1
4 TABLET, FILM COATED ORAL EVERY 4 HOURS PRN
Status: DISCONTINUED | OUTPATIENT
Start: 2019-10-27 | End: 2019-10-27

## 2019-10-27 RX ORDER — ACETAMINOPHEN 325 MG/1
325 TABLET ORAL EVERY 6 HOURS PRN
Status: DISCONTINUED | OUTPATIENT
Start: 2019-10-27 | End: 2019-10-31 | Stop reason: HOSPADM

## 2019-10-27 RX ORDER — BUMETANIDE 0.25 MG/ML
1 INJECTION, SOLUTION INTRAMUSCULAR; INTRAVENOUS DAILY
Status: DISCONTINUED | OUTPATIENT
Start: 2019-10-28 | End: 2019-10-27

## 2019-10-27 RX ORDER — ONDANSETRON 4 MG/1
4 TABLET, ORALLY DISINTEGRATING ORAL EVERY 4 HOURS PRN
Status: DISCONTINUED | OUTPATIENT
Start: 2019-10-27 | End: 2019-10-31 | Stop reason: HOSPADM

## 2019-10-27 RX ORDER — BUMETANIDE 1 MG/1
0.5 TABLET ORAL DAILY
Status: DISCONTINUED | OUTPATIENT
Start: 2019-10-28 | End: 2019-10-28

## 2019-10-27 RX ADMIN — SPIRONOLACTONE 25 MG: 25 TABLET ORAL at 08:03

## 2019-10-27 RX ADMIN — IPRATROPIUM BROMIDE AND ALBUTEROL SULFATE 1 AMPULE: .5; 3 SOLUTION RESPIRATORY (INHALATION) at 09:51

## 2019-10-27 RX ADMIN — ATORVASTATIN CALCIUM 40 MG: 40 TABLET, FILM COATED ORAL at 08:03

## 2019-10-27 RX ADMIN — GENTAMICIN SULFATE: 1 OINTMENT TOPICAL at 08:07

## 2019-10-27 RX ADMIN — IPRATROPIUM BROMIDE AND ALBUTEROL SULFATE 1 AMPULE: .5; 3 SOLUTION RESPIRATORY (INHALATION) at 18:01

## 2019-10-27 RX ADMIN — OXYCODONE HYDROCHLORIDE 10 MG: 10 TABLET, FILM COATED, EXTENDED RELEASE ORAL at 00:45

## 2019-10-27 RX ADMIN — BUMETANIDE 1 MG: 0.25 INJECTION INTRAMUSCULAR; INTRAVENOUS at 00:45

## 2019-10-27 RX ADMIN — GLIPIZIDE 5 MG: 10 TABLET ORAL at 08:03

## 2019-10-27 RX ADMIN — DULOXETINE HYDROCHLORIDE 30 MG: 30 CAPSULE, DELAYED RELEASE ORAL at 08:03

## 2019-10-27 RX ADMIN — CEFTRIAXONE 1 G: 1 INJECTION, POWDER, FOR SOLUTION INTRAMUSCULAR; INTRAVENOUS at 00:45

## 2019-10-27 RX ADMIN — OXYCODONE HYDROCHLORIDE 10 MG: 10 TABLET, FILM COATED, EXTENDED RELEASE ORAL at 12:30

## 2019-10-27 RX ADMIN — POTASSIUM CHLORIDE 40 MEQ: 20 TABLET, EXTENDED RELEASE ORAL at 08:02

## 2019-10-27 RX ADMIN — DOXAZOSIN MESYLATE 8 MG: 4 TABLET ORAL at 20:50

## 2019-10-27 RX ADMIN — ONDANSETRON 4 MG: 4 TABLET, ORALLY DISINTEGRATING ORAL at 16:04

## 2019-10-27 RX ADMIN — IPRATROPIUM BROMIDE AND ALBUTEROL SULFATE 1 AMPULE: .5; 3 SOLUTION RESPIRATORY (INHALATION) at 14:23

## 2019-10-27 RX ADMIN — ASPIRIN 81 MG: 81 TABLET, COATED ORAL at 08:02

## 2019-10-27 RX ADMIN — CEFTRIAXONE 1 G: 1 INJECTION, POWDER, FOR SOLUTION INTRAMUSCULAR; INTRAVENOUS at 23:47

## 2019-10-27 RX ADMIN — ISOSORBIDE MONONITRATE 30 MG: 30 TABLET ORAL at 08:02

## 2019-10-27 RX ADMIN — CARVEDILOL 25 MG: 25 TABLET, FILM COATED ORAL at 16:58

## 2019-10-27 RX ADMIN — ACETAMINOPHEN 325 MG: 325 TABLET ORAL at 09:18

## 2019-10-27 RX ADMIN — OXYCODONE HYDROCHLORIDE 10 MG: 10 TABLET, FILM COATED, EXTENDED RELEASE ORAL at 23:47

## 2019-10-27 RX ADMIN — ALBUTEROL SULFATE 2.5 MG: 2.5 SOLUTION RESPIRATORY (INHALATION) at 05:52

## 2019-10-27 RX ADMIN — DOXAZOSIN MESYLATE 8 MG: 4 TABLET ORAL at 08:03

## 2019-10-27 RX ADMIN — INSULIN GLARGINE 30 UNITS: 100 INJECTION, SOLUTION SUBCUTANEOUS at 20:50

## 2019-10-27 ASSESSMENT — PAIN - FUNCTIONAL ASSESSMENT
PAIN_FUNCTIONAL_ASSESSMENT: PREVENTS OR INTERFERES SOME ACTIVE ACTIVITIES AND ADLS
PAIN_FUNCTIONAL_ASSESSMENT: PREVENTS OR INTERFERES SOME ACTIVE ACTIVITIES AND ADLS
PAIN_FUNCTIONAL_ASSESSMENT: ACTIVITIES ARE NOT PREVENTED
PAIN_FUNCTIONAL_ASSESSMENT: PREVENTS OR INTERFERES SOME ACTIVE ACTIVITIES AND ADLS

## 2019-10-27 ASSESSMENT — PAIN DESCRIPTION - PAIN TYPE
TYPE: CHRONIC PAIN

## 2019-10-27 ASSESSMENT — PAIN DESCRIPTION - PROGRESSION
CLINICAL_PROGRESSION: NOT CHANGED
CLINICAL_PROGRESSION: NOT CHANGED
CLINICAL_PROGRESSION: GRADUALLY WORSENING

## 2019-10-27 ASSESSMENT — PAIN DESCRIPTION - ONSET
ONSET: ON-GOING

## 2019-10-27 ASSESSMENT — PAIN DESCRIPTION - LOCATION
LOCATION: KNEE
LOCATION: LEG
LOCATION: KNEE
LOCATION: KNEE

## 2019-10-27 ASSESSMENT — PAIN DESCRIPTION - FREQUENCY
FREQUENCY: INTERMITTENT
FREQUENCY: CONTINUOUS

## 2019-10-27 ASSESSMENT — PAIN DESCRIPTION - ORIENTATION
ORIENTATION: LEFT
ORIENTATION: RIGHT;LEFT
ORIENTATION: LEFT
ORIENTATION: LEFT

## 2019-10-27 ASSESSMENT — PAIN SCALES - GENERAL
PAINLEVEL_OUTOF10: 8
PAINLEVEL_OUTOF10: 7
PAINLEVEL_OUTOF10: 3
PAINLEVEL_OUTOF10: 7
PAINLEVEL_OUTOF10: 10
PAINLEVEL_OUTOF10: 8

## 2019-10-27 ASSESSMENT — PAIN DESCRIPTION - DESCRIPTORS
DESCRIPTORS: ACHING
DESCRIPTORS: ACHING;CONSTANT;DISCOMFORT;DULL
DESCRIPTORS: ACHING
DESCRIPTORS: ACHING

## 2019-10-28 PROBLEM — M76.899 QUADRICEPS TENDONITIS: Status: ACTIVE | Noted: 2019-10-28

## 2019-10-28 PROBLEM — G93.41 METABOLIC ENCEPHALOPATHY: Status: ACTIVE | Noted: 2019-10-28

## 2019-10-28 LAB
ANION GAP SERPL CALCULATED.3IONS-SCNC: 9 MMOL/L (ref 9–17)
BUN BLDV-MCNC: 25 MG/DL (ref 8–23)
BUN/CREAT BLD: 13 (ref 9–20)
CALCIUM SERPL-MCNC: 9 MG/DL (ref 8.6–10.4)
CHLORIDE BLD-SCNC: 105 MMOL/L (ref 98–107)
CO2: 29 MMOL/L (ref 20–31)
CREAT SERPL-MCNC: 1.91 MG/DL (ref 0.5–0.9)
CULTURE: ABNORMAL
ESTIMATED AVERAGE GLUCOSE: 128 MG/DL
GFR AFRICAN AMERICAN: 31 ML/MIN
GFR NON-AFRICAN AMERICAN: 26 ML/MIN
GFR SERPL CREATININE-BSD FRML MDRD: ABNORMAL ML/MIN/{1.73_M2}
GFR SERPL CREATININE-BSD FRML MDRD: ABNORMAL ML/MIN/{1.73_M2}
GLUCOSE BLD-MCNC: 103 MG/DL (ref 70–99)
GLUCOSE BLD-MCNC: 75 MG/DL (ref 65–105)
GLUCOSE BLD-MCNC: 82 MG/DL (ref 65–105)
GLUCOSE BLD-MCNC: 84 MG/DL (ref 65–105)
GLUCOSE BLD-MCNC: 90 MG/DL (ref 65–105)
HBA1C MFR BLD: 6.1 % (ref 4–6)
Lab: ABNORMAL
POTASSIUM SERPL-SCNC: 4.4 MMOL/L (ref 3.7–5.3)
SODIUM BLD-SCNC: 143 MMOL/L (ref 135–144)
SPECIMEN DESCRIPTION: ABNORMAL

## 2019-10-28 PROCEDURE — 2700000000 HC OXYGEN THERAPY PER DAY

## 2019-10-28 PROCEDURE — 97110 THERAPEUTIC EXERCISES: CPT

## 2019-10-28 PROCEDURE — 97116 GAIT TRAINING THERAPY: CPT

## 2019-10-28 PROCEDURE — 6370000000 HC RX 637 (ALT 250 FOR IP): Performed by: INTERNAL MEDICINE

## 2019-10-28 PROCEDURE — 82947 ASSAY GLUCOSE BLOOD QUANT: CPT

## 2019-10-28 PROCEDURE — 94761 N-INVAS EAR/PLS OXIMETRY MLT: CPT

## 2019-10-28 PROCEDURE — 6370000000 HC RX 637 (ALT 250 FOR IP): Performed by: ORTHOPAEDIC SURGERY

## 2019-10-28 PROCEDURE — 97530 THERAPEUTIC ACTIVITIES: CPT

## 2019-10-28 PROCEDURE — 80048 BASIC METABOLIC PNL TOTAL CA: CPT

## 2019-10-28 PROCEDURE — 36415 COLL VENOUS BLD VENIPUNCTURE: CPT

## 2019-10-28 PROCEDURE — 6370000000 HC RX 637 (ALT 250 FOR IP): Performed by: EMERGENCY MEDICINE

## 2019-10-28 PROCEDURE — 2060000000 HC ICU INTERMEDIATE R&B

## 2019-10-28 PROCEDURE — 94640 AIRWAY INHALATION TREATMENT: CPT

## 2019-10-28 PROCEDURE — 97162 PT EVAL MOD COMPLEX 30 MIN: CPT

## 2019-10-28 PROCEDURE — 6360000002 HC RX W HCPCS: Performed by: INTERNAL MEDICINE

## 2019-10-28 RX ORDER — TORSEMIDE 10 MG/1
100 TABLET ORAL DAILY
Status: DISCONTINUED | OUTPATIENT
Start: 2019-10-28 | End: 2019-10-28 | Stop reason: SDUPTHER

## 2019-10-28 RX ORDER — TORSEMIDE 20 MG/1
100 TABLET ORAL DAILY
Status: DISCONTINUED | OUTPATIENT
Start: 2019-10-29 | End: 2019-10-31 | Stop reason: HOSPADM

## 2019-10-28 RX ADMIN — TORSEMIDE 100 MG: 10 TABLET ORAL at 15:22

## 2019-10-28 RX ADMIN — SPIRONOLACTONE 25 MG: 25 TABLET ORAL at 08:51

## 2019-10-28 RX ADMIN — DULOXETINE HYDROCHLORIDE 30 MG: 30 CAPSULE, DELAYED RELEASE ORAL at 08:51

## 2019-10-28 RX ADMIN — IPRATROPIUM BROMIDE AND ALBUTEROL SULFATE 1 AMPULE: .5; 3 SOLUTION RESPIRATORY (INHALATION) at 10:57

## 2019-10-28 RX ADMIN — ATORVASTATIN CALCIUM 40 MG: 40 TABLET, FILM COATED ORAL at 08:50

## 2019-10-28 RX ADMIN — ISOSORBIDE MONONITRATE 30 MG: 30 TABLET ORAL at 08:51

## 2019-10-28 RX ADMIN — IPRATROPIUM BROMIDE AND ALBUTEROL SULFATE 1 AMPULE: .5; 3 SOLUTION RESPIRATORY (INHALATION) at 15:41

## 2019-10-28 RX ADMIN — DOXAZOSIN MESYLATE 8 MG: 4 TABLET ORAL at 08:50

## 2019-10-28 RX ADMIN — CARVEDILOL 25 MG: 25 TABLET, FILM COATED ORAL at 08:50

## 2019-10-28 RX ADMIN — GLIPIZIDE 5 MG: 10 TABLET ORAL at 08:50

## 2019-10-28 RX ADMIN — IPRATROPIUM BROMIDE AND ALBUTEROL SULFATE 1 AMPULE: .5; 3 SOLUTION RESPIRATORY (INHALATION) at 20:35

## 2019-10-28 RX ADMIN — GENTAMICIN SULFATE: 1 OINTMENT TOPICAL at 09:37

## 2019-10-28 RX ADMIN — OXYCODONE HYDROCHLORIDE 10 MG: 10 TABLET, FILM COATED, EXTENDED RELEASE ORAL at 12:15

## 2019-10-28 RX ADMIN — DOXAZOSIN MESYLATE 8 MG: 4 TABLET ORAL at 20:34

## 2019-10-28 RX ADMIN — BUMETANIDE 0.5 MG: 1 TABLET ORAL at 08:51

## 2019-10-28 RX ADMIN — ASPIRIN 81 MG: 81 TABLET, COATED ORAL at 08:50

## 2019-10-28 RX ADMIN — DICLOFENAC 2 G: 10 GEL TOPICAL at 20:34

## 2019-10-28 RX ADMIN — CARVEDILOL 25 MG: 25 TABLET, FILM COATED ORAL at 16:25

## 2019-10-28 RX ADMIN — DICLOFENAC 2 G: 10 GEL TOPICAL at 08:52

## 2019-10-28 ASSESSMENT — PAIN SCALES - GENERAL
PAINLEVEL_OUTOF10: 7
PAINLEVEL_OUTOF10: 0

## 2019-10-28 ASSESSMENT — PAIN DESCRIPTION - PROGRESSION: CLINICAL_PROGRESSION: NOT CHANGED

## 2019-10-28 ASSESSMENT — PAIN DESCRIPTION - ORIENTATION
ORIENTATION: LEFT
ORIENTATION: LEFT

## 2019-10-28 ASSESSMENT — PAIN DESCRIPTION - LOCATION
LOCATION: KNEE
LOCATION: KNEE

## 2019-10-28 ASSESSMENT — PAIN DESCRIPTION - PAIN TYPE
TYPE: CHRONIC PAIN
TYPE: ACUTE PAIN

## 2019-10-28 ASSESSMENT — PAIN DESCRIPTION - DESCRIPTORS: DESCRIPTORS: ACHING;DISCOMFORT

## 2019-10-28 ASSESSMENT — PAIN - FUNCTIONAL ASSESSMENT: PAIN_FUNCTIONAL_ASSESSMENT: PREVENTS OR INTERFERES SOME ACTIVE ACTIVITIES AND ADLS

## 2019-10-28 ASSESSMENT — PAIN DESCRIPTION - ONSET: ONSET: ON-GOING

## 2019-10-28 ASSESSMENT — PAIN DESCRIPTION - FREQUENCY: FREQUENCY: CONTINUOUS

## 2019-10-28 ASSESSMENT — ENCOUNTER SYMPTOMS: SHORTNESS OF BREATH: 1

## 2019-10-29 ENCOUNTER — APPOINTMENT (OUTPATIENT)
Dept: GENERAL RADIOLOGY | Age: 74
DRG: 291 | End: 2019-10-29
Payer: MEDICARE

## 2019-10-29 LAB
ANION GAP SERPL CALCULATED.3IONS-SCNC: 9 MMOL/L (ref 9–17)
BUN BLDV-MCNC: 31 MG/DL (ref 8–23)
BUN/CREAT BLD: 14 (ref 9–20)
CALCIUM SERPL-MCNC: 9.1 MG/DL (ref 8.6–10.4)
CHLORIDE BLD-SCNC: 102 MMOL/L (ref 98–107)
CO2: 28 MMOL/L (ref 20–31)
CREAT SERPL-MCNC: 2.15 MG/DL (ref 0.5–0.9)
CULTURE: ABNORMAL
CULTURE: ABNORMAL
DIRECT EXAM: ABNORMAL
DIRECT EXAM: ABNORMAL
GFR AFRICAN AMERICAN: 27 ML/MIN
GFR NON-AFRICAN AMERICAN: 22 ML/MIN
GFR SERPL CREATININE-BSD FRML MDRD: ABNORMAL ML/MIN/{1.73_M2}
GFR SERPL CREATININE-BSD FRML MDRD: ABNORMAL ML/MIN/{1.73_M2}
GLUCOSE BLD-MCNC: 132 MG/DL (ref 65–105)
GLUCOSE BLD-MCNC: 142 MG/DL (ref 65–105)
GLUCOSE BLD-MCNC: 148 MG/DL (ref 70–99)
GLUCOSE BLD-MCNC: 149 MG/DL (ref 65–105)
GLUCOSE BLD-MCNC: 158 MG/DL (ref 65–105)
Lab: ABNORMAL
POTASSIUM SERPL-SCNC: 4.4 MMOL/L (ref 3.7–5.3)
SODIUM BLD-SCNC: 139 MMOL/L (ref 135–144)
SPECIMEN DESCRIPTION: ABNORMAL

## 2019-10-29 PROCEDURE — 2580000003 HC RX 258: Performed by: INTERNAL MEDICINE

## 2019-10-29 PROCEDURE — 97110 THERAPEUTIC EXERCISES: CPT

## 2019-10-29 PROCEDURE — 97535 SELF CARE MNGMENT TRAINING: CPT

## 2019-10-29 PROCEDURE — 2060000000 HC ICU INTERMEDIATE R&B

## 2019-10-29 PROCEDURE — 6370000000 HC RX 637 (ALT 250 FOR IP): Performed by: EMERGENCY MEDICINE

## 2019-10-29 PROCEDURE — 94640 AIRWAY INHALATION TREATMENT: CPT

## 2019-10-29 PROCEDURE — 6370000000 HC RX 637 (ALT 250 FOR IP): Performed by: INTERNAL MEDICINE

## 2019-10-29 PROCEDURE — 2700000000 HC OXYGEN THERAPY PER DAY

## 2019-10-29 PROCEDURE — 36415 COLL VENOUS BLD VENIPUNCTURE: CPT

## 2019-10-29 PROCEDURE — 97530 THERAPEUTIC ACTIVITIES: CPT

## 2019-10-29 PROCEDURE — 71045 X-RAY EXAM CHEST 1 VIEW: CPT

## 2019-10-29 PROCEDURE — 97166 OT EVAL MOD COMPLEX 45 MIN: CPT

## 2019-10-29 PROCEDURE — 94761 N-INVAS EAR/PLS OXIMETRY MLT: CPT

## 2019-10-29 PROCEDURE — 82947 ASSAY GLUCOSE BLOOD QUANT: CPT

## 2019-10-29 PROCEDURE — 6360000002 HC RX W HCPCS: Performed by: INTERNAL MEDICINE

## 2019-10-29 PROCEDURE — 80048 BASIC METABOLIC PNL TOTAL CA: CPT

## 2019-10-29 RX ORDER — CEPHALEXIN 500 MG/1
500 CAPSULE ORAL EVERY 12 HOURS SCHEDULED
Status: DISCONTINUED | OUTPATIENT
Start: 2019-10-29 | End: 2019-10-31 | Stop reason: HOSPADM

## 2019-10-29 RX ORDER — DOXAZOSIN MESYLATE 4 MG/1
8 TABLET ORAL NIGHTLY
Status: DISCONTINUED | OUTPATIENT
Start: 2019-10-30 | End: 2019-10-31 | Stop reason: HOSPADM

## 2019-10-29 RX ADMIN — GLIPIZIDE 5 MG: 10 TABLET ORAL at 09:10

## 2019-10-29 RX ADMIN — CARVEDILOL 25 MG: 25 TABLET, FILM COATED ORAL at 17:30

## 2019-10-29 RX ADMIN — IPRATROPIUM BROMIDE AND ALBUTEROL SULFATE 1 AMPULE: .5; 3 SOLUTION RESPIRATORY (INHALATION) at 19:00

## 2019-10-29 RX ADMIN — ISOSORBIDE MONONITRATE 30 MG: 30 TABLET ORAL at 09:09

## 2019-10-29 RX ADMIN — ATORVASTATIN CALCIUM 40 MG: 40 TABLET, FILM COATED ORAL at 09:09

## 2019-10-29 RX ADMIN — SPIRONOLACTONE 25 MG: 25 TABLET ORAL at 09:10

## 2019-10-29 RX ADMIN — INSULIN GLARGINE 30 UNITS: 100 INJECTION, SOLUTION SUBCUTANEOUS at 21:18

## 2019-10-29 RX ADMIN — CEPHALEXIN 500 MG: 500 CAPSULE ORAL at 21:18

## 2019-10-29 RX ADMIN — IPRATROPIUM BROMIDE AND ALBUTEROL SULFATE 1 AMPULE: .5; 3 SOLUTION RESPIRATORY (INHALATION) at 11:11

## 2019-10-29 RX ADMIN — IPRATROPIUM BROMIDE AND ALBUTEROL SULFATE 1 AMPULE: .5; 3 SOLUTION RESPIRATORY (INHALATION) at 07:31

## 2019-10-29 RX ADMIN — CARVEDILOL 25 MG: 25 TABLET, FILM COATED ORAL at 09:14

## 2019-10-29 RX ADMIN — IPRATROPIUM BROMIDE AND ALBUTEROL SULFATE 1 AMPULE: .5; 3 SOLUTION RESPIRATORY (INHALATION) at 14:36

## 2019-10-29 RX ADMIN — OXYCODONE HYDROCHLORIDE 10 MG: 10 TABLET, FILM COATED, EXTENDED RELEASE ORAL at 11:27

## 2019-10-29 RX ADMIN — ASPIRIN 81 MG: 81 TABLET, COATED ORAL at 09:10

## 2019-10-29 RX ADMIN — CEFTRIAXONE 1 G: 1 INJECTION, POWDER, FOR SOLUTION INTRAMUSCULAR; INTRAVENOUS at 00:39

## 2019-10-29 RX ADMIN — DOXAZOSIN MESYLATE 8 MG: 4 TABLET ORAL at 09:10

## 2019-10-29 RX ADMIN — DICLOFENAC 2 G: 10 GEL TOPICAL at 09:20

## 2019-10-29 RX ADMIN — GENTAMICIN SULFATE: 1 OINTMENT TOPICAL at 09:20

## 2019-10-29 RX ADMIN — TORSEMIDE 100 MG: 20 TABLET ORAL at 09:10

## 2019-10-29 RX ADMIN — DULOXETINE HYDROCHLORIDE 30 MG: 30 CAPSULE, DELAYED RELEASE ORAL at 09:09

## 2019-10-29 RX ADMIN — OXYCODONE HYDROCHLORIDE 10 MG: 10 TABLET, FILM COATED, EXTENDED RELEASE ORAL at 00:11

## 2019-10-29 RX ADMIN — DICLOFENAC 2 G: 10 GEL TOPICAL at 21:18

## 2019-10-29 ASSESSMENT — PAIN SCALES - GENERAL
PAINLEVEL_OUTOF10: 6
PAINLEVEL_OUTOF10: 0
PAINLEVEL_OUTOF10: 7

## 2019-10-30 PROBLEM — B96.20 E. COLI UTI (URINARY TRACT INFECTION): Status: ACTIVE | Noted: 2019-10-25

## 2019-10-30 LAB
ANION GAP SERPL CALCULATED.3IONS-SCNC: 8 MMOL/L (ref 9–17)
BUN BLDV-MCNC: 36 MG/DL (ref 8–23)
BUN/CREAT BLD: 15 (ref 9–20)
CALCIUM SERPL-MCNC: 8.9 MG/DL (ref 8.6–10.4)
CHLORIDE BLD-SCNC: 101 MMOL/L (ref 98–107)
CO2: 31 MMOL/L (ref 20–31)
CREAT SERPL-MCNC: 2.38 MG/DL (ref 0.5–0.9)
GFR AFRICAN AMERICAN: 24 ML/MIN
GFR NON-AFRICAN AMERICAN: 20 ML/MIN
GFR SERPL CREATININE-BSD FRML MDRD: ABNORMAL ML/MIN/{1.73_M2}
GFR SERPL CREATININE-BSD FRML MDRD: ABNORMAL ML/MIN/{1.73_M2}
GLUCOSE BLD-MCNC: 119 MG/DL (ref 65–105)
GLUCOSE BLD-MCNC: 125 MG/DL (ref 65–105)
GLUCOSE BLD-MCNC: 131 MG/DL (ref 65–105)
GLUCOSE BLD-MCNC: 134 MG/DL (ref 65–105)
GLUCOSE BLD-MCNC: 143 MG/DL (ref 70–99)
POTASSIUM SERPL-SCNC: 4.3 MMOL/L (ref 3.7–5.3)
SODIUM BLD-SCNC: 140 MMOL/L (ref 135–144)

## 2019-10-30 PROCEDURE — 82947 ASSAY GLUCOSE BLOOD QUANT: CPT

## 2019-10-30 PROCEDURE — 2060000000 HC ICU INTERMEDIATE R&B

## 2019-10-30 PROCEDURE — 80048 BASIC METABOLIC PNL TOTAL CA: CPT

## 2019-10-30 PROCEDURE — 6370000000 HC RX 637 (ALT 250 FOR IP): Performed by: INTERNAL MEDICINE

## 2019-10-30 PROCEDURE — 97535 SELF CARE MNGMENT TRAINING: CPT

## 2019-10-30 PROCEDURE — 6370000000 HC RX 637 (ALT 250 FOR IP): Performed by: EMERGENCY MEDICINE

## 2019-10-30 PROCEDURE — 97110 THERAPEUTIC EXERCISES: CPT

## 2019-10-30 PROCEDURE — 94640 AIRWAY INHALATION TREATMENT: CPT

## 2019-10-30 PROCEDURE — 97116 GAIT TRAINING THERAPY: CPT

## 2019-10-30 PROCEDURE — 94761 N-INVAS EAR/PLS OXIMETRY MLT: CPT

## 2019-10-30 PROCEDURE — 2700000000 HC OXYGEN THERAPY PER DAY

## 2019-10-30 PROCEDURE — 36415 COLL VENOUS BLD VENIPUNCTURE: CPT

## 2019-10-30 PROCEDURE — 94760 N-INVAS EAR/PLS OXIMETRY 1: CPT

## 2019-10-30 RX ORDER — CEPHALEXIN 500 MG/1
500 CAPSULE ORAL EVERY 12 HOURS SCHEDULED
Qty: 20 CAPSULE | Refills: 0
Start: 2019-10-30 | End: 2019-11-09

## 2019-10-30 RX ORDER — DIAPER,BRIEF,INFANT-TODD,DISP
EACH MISCELLANEOUS DAILY
Qty: 30 G | Refills: 3 | Status: SHIPPED | OUTPATIENT
Start: 2019-10-30

## 2019-10-30 RX ORDER — OXYCODONE HCL 10 MG/1
10 TABLET, FILM COATED, EXTENDED RELEASE ORAL EVERY 12 HOURS
Qty: 20 EACH | Refills: 0 | Status: SHIPPED | OUTPATIENT
Start: 2019-10-31 | End: 2019-11-10

## 2019-10-30 RX ORDER — GENTAMICIN SULFATE 1 MG/G
OINTMENT TOPICAL DAILY
Qty: 30 G | Refills: 3
Start: 2019-10-30 | End: 2019-11-29

## 2019-10-30 RX ADMIN — CEPHALEXIN 500 MG: 500 CAPSULE ORAL at 09:17

## 2019-10-30 RX ADMIN — DOXAZOSIN 8 MG: 4 TABLET ORAL at 20:15

## 2019-10-30 RX ADMIN — OXYCODONE HYDROCHLORIDE 10 MG: 10 TABLET, FILM COATED, EXTENDED RELEASE ORAL at 01:04

## 2019-10-30 RX ADMIN — ISOSORBIDE MONONITRATE 30 MG: 30 TABLET ORAL at 09:16

## 2019-10-30 RX ADMIN — SPIRONOLACTONE 25 MG: 25 TABLET ORAL at 09:16

## 2019-10-30 RX ADMIN — ASPIRIN 81 MG: 81 TABLET, COATED ORAL at 09:16

## 2019-10-30 RX ADMIN — DULOXETINE HYDROCHLORIDE 30 MG: 30 CAPSULE, DELAYED RELEASE ORAL at 09:17

## 2019-10-30 RX ADMIN — IPRATROPIUM BROMIDE AND ALBUTEROL SULFATE 1 AMPULE: .5; 3 SOLUTION RESPIRATORY (INHALATION) at 11:05

## 2019-10-30 RX ADMIN — CARVEDILOL 25 MG: 25 TABLET, FILM COATED ORAL at 17:22

## 2019-10-30 RX ADMIN — TORSEMIDE 100 MG: 20 TABLET ORAL at 09:17

## 2019-10-30 RX ADMIN — GENTAMICIN SULFATE: 1 OINTMENT TOPICAL at 09:21

## 2019-10-30 RX ADMIN — ATORVASTATIN CALCIUM 40 MG: 40 TABLET, FILM COATED ORAL at 09:16

## 2019-10-30 RX ADMIN — CEPHALEXIN 500 MG: 500 CAPSULE ORAL at 20:15

## 2019-10-30 RX ADMIN — CARVEDILOL 25 MG: 25 TABLET, FILM COATED ORAL at 09:21

## 2019-10-30 RX ADMIN — GLIPIZIDE 5 MG: 10 TABLET ORAL at 09:16

## 2019-10-30 RX ADMIN — IPRATROPIUM BROMIDE AND ALBUTEROL SULFATE 1 AMPULE: .5; 3 SOLUTION RESPIRATORY (INHALATION) at 15:14

## 2019-10-30 RX ADMIN — DICLOFENAC 2 G: 10 GEL TOPICAL at 09:21

## 2019-10-30 RX ADMIN — IPRATROPIUM BROMIDE AND ALBUTEROL SULFATE 1 AMPULE: .5; 3 SOLUTION RESPIRATORY (INHALATION) at 19:41

## 2019-10-30 RX ADMIN — INSULIN GLARGINE 30 UNITS: 100 INJECTION, SOLUTION SUBCUTANEOUS at 20:14

## 2019-10-30 RX ADMIN — IPRATROPIUM BROMIDE AND ALBUTEROL SULFATE 1 AMPULE: .5; 3 SOLUTION RESPIRATORY (INHALATION) at 05:15

## 2019-10-30 ASSESSMENT — PAIN SCALES - GENERAL
PAINLEVEL_OUTOF10: 4
PAINLEVEL_OUTOF10: 0

## 2019-10-31 VITALS
SYSTOLIC BLOOD PRESSURE: 141 MMHG | RESPIRATION RATE: 16 BRPM | TEMPERATURE: 98.3 F | HEIGHT: 65 IN | HEART RATE: 64 BPM | WEIGHT: 249 LBS | BODY MASS INDEX: 41.48 KG/M2 | OXYGEN SATURATION: 93 % | DIASTOLIC BLOOD PRESSURE: 52 MMHG

## 2019-10-31 LAB
ANION GAP SERPL CALCULATED.3IONS-SCNC: 9 MMOL/L (ref 9–17)
BUN BLDV-MCNC: 38 MG/DL (ref 8–23)
BUN/CREAT BLD: 18 (ref 9–20)
CALCIUM SERPL-MCNC: 9.2 MG/DL (ref 8.6–10.4)
CHLORIDE BLD-SCNC: 100 MMOL/L (ref 98–107)
CO2: 32 MMOL/L (ref 20–31)
CREAT SERPL-MCNC: 2.14 MG/DL (ref 0.5–0.9)
GFR AFRICAN AMERICAN: 27 ML/MIN
GFR NON-AFRICAN AMERICAN: 23 ML/MIN
GFR SERPL CREATININE-BSD FRML MDRD: ABNORMAL ML/MIN/{1.73_M2}
GFR SERPL CREATININE-BSD FRML MDRD: ABNORMAL ML/MIN/{1.73_M2}
GLUCOSE BLD-MCNC: 134 MG/DL (ref 65–105)
GLUCOSE BLD-MCNC: 134 MG/DL (ref 70–99)
GLUCOSE BLD-MCNC: 91 MG/DL (ref 65–105)
POTASSIUM SERPL-SCNC: 4.2 MMOL/L (ref 3.7–5.3)
SODIUM BLD-SCNC: 141 MMOL/L (ref 135–144)

## 2019-10-31 PROCEDURE — 94640 AIRWAY INHALATION TREATMENT: CPT

## 2019-10-31 PROCEDURE — 94760 N-INVAS EAR/PLS OXIMETRY 1: CPT

## 2019-10-31 PROCEDURE — 6370000000 HC RX 637 (ALT 250 FOR IP): Performed by: INTERNAL MEDICINE

## 2019-10-31 PROCEDURE — 97535 SELF CARE MNGMENT TRAINING: CPT

## 2019-10-31 PROCEDURE — 2700000000 HC OXYGEN THERAPY PER DAY

## 2019-10-31 PROCEDURE — 36415 COLL VENOUS BLD VENIPUNCTURE: CPT

## 2019-10-31 PROCEDURE — 6370000000 HC RX 637 (ALT 250 FOR IP): Performed by: EMERGENCY MEDICINE

## 2019-10-31 PROCEDURE — 80048 BASIC METABOLIC PNL TOTAL CA: CPT

## 2019-10-31 PROCEDURE — 97530 THERAPEUTIC ACTIVITIES: CPT

## 2019-10-31 PROCEDURE — 82947 ASSAY GLUCOSE BLOOD QUANT: CPT

## 2019-10-31 RX ADMIN — SPIRONOLACTONE 25 MG: 25 TABLET ORAL at 10:25

## 2019-10-31 RX ADMIN — GLIPIZIDE 5 MG: 10 TABLET ORAL at 10:26

## 2019-10-31 RX ADMIN — CEPHALEXIN 500 MG: 500 CAPSULE ORAL at 10:20

## 2019-10-31 RX ADMIN — IPRATROPIUM BROMIDE AND ALBUTEROL SULFATE 1 AMPULE: .5; 3 SOLUTION RESPIRATORY (INHALATION) at 15:07

## 2019-10-31 RX ADMIN — IPRATROPIUM BROMIDE AND ALBUTEROL SULFATE 1 AMPULE: .5; 3 SOLUTION RESPIRATORY (INHALATION) at 07:45

## 2019-10-31 RX ADMIN — ISOSORBIDE MONONITRATE 30 MG: 30 TABLET ORAL at 10:23

## 2019-10-31 RX ADMIN — CARVEDILOL 25 MG: 25 TABLET, FILM COATED ORAL at 10:25

## 2019-10-31 RX ADMIN — ASPIRIN 81 MG: 81 TABLET, COATED ORAL at 10:19

## 2019-10-31 RX ADMIN — IPRATROPIUM BROMIDE AND ALBUTEROL SULFATE 1 AMPULE: .5; 3 SOLUTION RESPIRATORY (INHALATION) at 10:51

## 2019-10-31 RX ADMIN — ATORVASTATIN CALCIUM 40 MG: 40 TABLET, FILM COATED ORAL at 10:20

## 2019-10-31 RX ADMIN — DICLOFENAC 2 G: 10 GEL TOPICAL at 10:22

## 2019-10-31 RX ADMIN — DULOXETINE HYDROCHLORIDE 30 MG: 30 CAPSULE, DELAYED RELEASE ORAL at 10:23

## 2019-10-31 RX ADMIN — OXYCODONE HYDROCHLORIDE 10 MG: 10 TABLET, FILM COATED, EXTENDED RELEASE ORAL at 12:15

## 2019-10-31 RX ADMIN — TORSEMIDE 100 MG: 20 TABLET ORAL at 10:36

## 2019-10-31 RX ADMIN — GENTAMICIN SULFATE: 1 OINTMENT TOPICAL at 10:23

## 2019-10-31 ASSESSMENT — PAIN SCALES - GENERAL
PAINLEVEL_OUTOF10: 0
PAINLEVEL_OUTOF10: 7
PAINLEVEL_OUTOF10: 0

## 2019-10-31 ASSESSMENT — PAIN DESCRIPTION - FREQUENCY: FREQUENCY: INTERMITTENT

## 2019-10-31 ASSESSMENT — PAIN DESCRIPTION - PAIN TYPE
TYPE: NEUROPATHIC PAIN
TYPE: NEUROPATHIC PAIN

## 2019-11-01 LAB
CULTURE: NORMAL
Lab: NORMAL
SPECIMEN DESCRIPTION: NORMAL

## 2019-11-24 PROBLEM — R77.8 ELEVATED TROPONIN: Status: RESOLVED | Noted: 2019-10-25 | Resolved: 2019-11-24

## 2020-03-25 PROBLEM — I50.33 ACUTE ON CHRONIC DIASTOLIC HEART FAILURE (HCC): Status: RESOLVED | Noted: 2017-08-18 | Resolved: 2020-03-24

## 2020-03-25 PROBLEM — J84.9 INTERSTITIAL LUNG DISEASE (HCC): Status: RESOLVED | Noted: 2018-10-30 | Resolved: 2020-03-24

## 2020-03-25 PROBLEM — E66.01 MORBID OBESITY WITH BMI OF 40.0-44.9, ADULT (HCC): Status: RESOLVED | Noted: 2018-02-08 | Resolved: 2020-03-24

## 2020-11-03 PROBLEM — E11.9 DM TYPE 2 (DIABETES MELLITUS, TYPE 2) (HCC): Status: RESOLVED | Noted: 2020-11-03 | Resolved: 2020-11-03

## 2021-05-10 ENCOUNTER — HOSPITAL ENCOUNTER (OUTPATIENT)
Dept: CT IMAGING | Age: 76
Discharge: HOME | End: 2021-05-12
Payer: MEDICARE

## 2021-05-10 DIAGNOSIS — R10.84 GENERALIZED ABDOMINAL DISCOMFORT: ICD-10-CM

## 2021-05-10 PROCEDURE — 74176 CT ABD & PELVIS W/O CONTRAST: CPT

## 2021-05-10 PROCEDURE — 6360000004 HC RX CONTRAST MEDICATION: Performed by: INTERNAL MEDICINE

## 2021-05-10 RX ADMIN — IOHEXOL 30 ML: 300 INJECTION, SOLUTION INTRAVENOUS at 11:51

## 2021-06-14 ENCOUNTER — HOSPITAL ENCOUNTER (OUTPATIENT)
Dept: NUCLEAR MEDICINE | Age: 76
Discharge: HOME | End: 2021-06-16
Payer: MEDICARE

## 2021-06-14 DIAGNOSIS — R11.10 VOMITING, INTRACTABILITY OF VOMITING NOT SPECIFIED, PRESENCE OF NAUSEA NOT SPECIFIED, UNSPECIFIED VOMITING TYPE: ICD-10-CM

## 2021-06-14 DIAGNOSIS — K80.20 CALCULUS OF GALLBLADDER WITHOUT CHOLECYSTITIS WITHOUT OBSTRUCTION: ICD-10-CM

## 2021-06-14 DIAGNOSIS — K90.49 FOOD INTOLERANCE: ICD-10-CM

## 2021-06-14 PROCEDURE — A9537 TC99M MEBROFENIN: HCPCS | Performed by: SURGERY

## 2021-06-14 PROCEDURE — 78227 HEPATOBIL SYST IMAGE W/DRUG: CPT

## 2021-06-14 PROCEDURE — 3430000000 HC RX DIAGNOSTIC RADIOPHARMACEUTICAL: Performed by: SURGERY

## 2021-06-14 RX ADMIN — Medication 5.4 MILLICURIE: at 09:55

## 2021-07-07 ENCOUNTER — HOSPITAL ENCOUNTER (OUTPATIENT)
Age: 76
Setting detail: OUTPATIENT SURGERY
Discharge: HOME | End: 2021-07-07
Attending: SURGERY | Admitting: SURGERY
Payer: MEDICARE

## 2021-07-07 ENCOUNTER — ANESTHESIA (OUTPATIENT)
Dept: OPERATING ROOM | Age: 76
End: 2021-07-07
Payer: MEDICARE

## 2021-07-07 ENCOUNTER — ANESTHESIA EVENT (OUTPATIENT)
Dept: OPERATING ROOM | Age: 76
End: 2021-07-07
Payer: MEDICARE

## 2021-07-07 VITALS
BODY MASS INDEX: 40.48 KG/M2 | TEMPERATURE: 97.5 F | SYSTOLIC BLOOD PRESSURE: 157 MMHG | OXYGEN SATURATION: 95 % | HEART RATE: 74 BPM | WEIGHT: 243 LBS | DIASTOLIC BLOOD PRESSURE: 68 MMHG | HEIGHT: 65 IN | RESPIRATION RATE: 18 BRPM

## 2021-07-07 VITALS — OXYGEN SATURATION: 100 % | SYSTOLIC BLOOD PRESSURE: 97 MMHG | DIASTOLIC BLOOD PRESSURE: 50 MMHG

## 2021-07-07 LAB
GLUCOSE BLD-MCNC: 125 MG/DL (ref 65–105)
GLUCOSE BLD-MCNC: 85 MG/DL (ref 65–105)

## 2021-07-07 PROCEDURE — 3609010400 HC COLONOSCOPY POLYPECTOMY HOT BIOPSY: Performed by: SURGERY

## 2021-07-07 PROCEDURE — 3700000001 HC ADD 15 MINUTES (ANESTHESIA): Performed by: SURGERY

## 2021-07-07 PROCEDURE — 6360000002 HC RX W HCPCS

## 2021-07-07 PROCEDURE — 2709999900 HC NON-CHARGEABLE SUPPLY: Performed by: SURGERY

## 2021-07-07 PROCEDURE — 7100000010 HC PHASE II RECOVERY - FIRST 15 MIN: Performed by: SURGERY

## 2021-07-07 PROCEDURE — 88305 TISSUE EXAM BY PATHOLOGIST: CPT

## 2021-07-07 PROCEDURE — 2500000003 HC RX 250 WO HCPCS

## 2021-07-07 PROCEDURE — 2580000003 HC RX 258: Performed by: ANESTHESIOLOGY

## 2021-07-07 PROCEDURE — 82947 ASSAY GLUCOSE BLOOD QUANT: CPT

## 2021-07-07 PROCEDURE — 3700000000 HC ANESTHESIA ATTENDED CARE: Performed by: SURGERY

## 2021-07-07 PROCEDURE — 7100000011 HC PHASE II RECOVERY - ADDTL 15 MIN: Performed by: SURGERY

## 2021-07-07 RX ORDER — LIDOCAINE HYDROCHLORIDE 20 MG/ML
INJECTION, SOLUTION EPIDURAL; INFILTRATION; INTRACAUDAL; PERINEURAL PRN
Status: DISCONTINUED | OUTPATIENT
Start: 2021-07-07 | End: 2021-07-07 | Stop reason: SDUPTHER

## 2021-07-07 RX ORDER — PROPOFOL 10 MG/ML
INJECTION, EMULSION INTRAVENOUS PRN
Status: DISCONTINUED | OUTPATIENT
Start: 2021-07-07 | End: 2021-07-07 | Stop reason: SDUPTHER

## 2021-07-07 RX ORDER — SODIUM CHLORIDE, SODIUM LACTATE, POTASSIUM CHLORIDE, CALCIUM CHLORIDE 600; 310; 30; 20 MG/100ML; MG/100ML; MG/100ML; MG/100ML
INJECTION, SOLUTION INTRAVENOUS CONTINUOUS
Status: DISCONTINUED | OUTPATIENT
Start: 2021-07-08 | End: 2021-07-07 | Stop reason: HOSPADM

## 2021-07-07 RX ORDER — KETAMINE HYDROCHLORIDE 100 MG/ML
INJECTION, SOLUTION INTRAMUSCULAR; INTRAVENOUS PRN
Status: DISCONTINUED | OUTPATIENT
Start: 2021-07-07 | End: 2021-07-07 | Stop reason: SDUPTHER

## 2021-07-07 RX ORDER — ONDANSETRON 2 MG/ML
4 INJECTION INTRAMUSCULAR; INTRAVENOUS
Status: DISCONTINUED | OUTPATIENT
Start: 2021-07-07 | End: 2021-07-07 | Stop reason: HOSPADM

## 2021-07-07 RX ORDER — GLYCOPYRROLATE 1 MG/5 ML
SYRINGE (ML) INTRAVENOUS PRN
Status: DISCONTINUED | OUTPATIENT
Start: 2021-07-07 | End: 2021-07-07 | Stop reason: SDUPTHER

## 2021-07-07 RX ORDER — PROPOFOL 10 MG/ML
INJECTION, EMULSION INTRAVENOUS CONTINUOUS PRN
Status: DISCONTINUED | OUTPATIENT
Start: 2021-07-07 | End: 2021-07-07 | Stop reason: SDUPTHER

## 2021-07-07 RX ORDER — LIDOCAINE HYDROCHLORIDE 10 MG/ML
1 INJECTION, SOLUTION EPIDURAL; INFILTRATION; INTRACAUDAL; PERINEURAL
Status: DISCONTINUED | OUTPATIENT
Start: 2021-07-08 | End: 2021-07-07 | Stop reason: HOSPADM

## 2021-07-07 RX ADMIN — SODIUM CHLORIDE, POTASSIUM CHLORIDE, SODIUM LACTATE AND CALCIUM CHLORIDE: 600; 310; 30; 20 INJECTION, SOLUTION INTRAVENOUS at 07:36

## 2021-07-07 RX ADMIN — Medication 0.1 MG: at 09:25

## 2021-07-07 RX ADMIN — LIDOCAINE HYDROCHLORIDE 80 MG: 20 INJECTION, SOLUTION EPIDURAL; INFILTRATION; INTRACAUDAL; PERINEURAL at 08:45

## 2021-07-07 RX ADMIN — GLUCAGON HYDROCHLORIDE 0.5 MG: 1 INJECTION, POWDER, FOR SOLUTION INTRAMUSCULAR; INTRAVENOUS; SUBCUTANEOUS at 09:10

## 2021-07-07 RX ADMIN — PROPOFOL 20 MG: 10 INJECTION, EMULSION INTRAVENOUS at 08:47

## 2021-07-07 RX ADMIN — KETAMINE HYDROCHLORIDE 20 MG: 100 INJECTION INTRAMUSCULAR; INTRAVENOUS at 09:20

## 2021-07-07 RX ADMIN — KETAMINE HYDROCHLORIDE 10 MG: 100 INJECTION INTRAMUSCULAR; INTRAVENOUS at 09:36

## 2021-07-07 RX ADMIN — PROPOFOL 30 MCG/KG/MIN: 10 INJECTION, EMULSION INTRAVENOUS at 08:45

## 2021-07-07 RX ADMIN — PROPOFOL 20 MG: 10 INJECTION, EMULSION INTRAVENOUS at 08:46

## 2021-07-07 RX ADMIN — PROPOFOL 20 MG: 10 INJECTION, EMULSION INTRAVENOUS at 08:45

## 2021-07-07 RX ADMIN — KETAMINE HYDROCHLORIDE 20 MG: 100 INJECTION INTRAMUSCULAR; INTRAVENOUS at 09:30

## 2021-07-07 ASSESSMENT — PULMONARY FUNCTION TESTS
PIF_VALUE: 1
PIF_VALUE: 3
PIF_VALUE: 1
PIF_VALUE: 3
PIF_VALUE: 1
PIF_VALUE: 2
PIF_VALUE: 1
PIF_VALUE: 2
PIF_VALUE: 1
PIF_VALUE: 1

## 2021-07-07 ASSESSMENT — PAIN - FUNCTIONAL ASSESSMENT
PAIN_FUNCTIONAL_ASSESSMENT: 0-10

## 2021-07-07 ASSESSMENT — ENCOUNTER SYMPTOMS: SHORTNESS OF BREATH: 1

## 2021-07-07 NOTE — BRIEF OP NOTE
Brief Postoperative Note      Patient: Fadi Forte  YOB: 1945  MRN: [id number]    Date of Procedure: 7/7/2021    Pre-Op Diagnosis: SCREENING    Post-Op Diagnosis: multiplke colon and rectal polyps; diverticulosis; fair prep       Procedure(s):  COLONOSCOPY POLYPECTOMY HOT BIOPSIES AND  COLON BX  AND  INK TATOO OF COLON    Surgeon(s):  Amber Parham MD    Assistant:  * No surgical staff found *    Anesthesia: Monitor Anesthesia Care    Estimated Blood Loss (mL): Minimal    Complications: None    Specimens:   ID Type Source Tests Collected by Time Destination   A : RECTAL POLYP Tissue Rectum SURGICAL PATHOLOGY Amber Parham MD 7/7/2021 6700    B : RECTAL POLYP #2  Tissue Rectum Raz Wisdom MD 7/7/2021 0909    C :  COLON POLYP  [facility], MD 7/7/2021 8107    D : [address] [facility], MD 7/7/2021 2714    E : [address] MD Billy 7/7/2021 0930    F : CECAL POLYP #2 Tissue Colon Raz Wisdom MD 7/7/2021 0941    G : [id number], MD 7/7/2021 0951    H : [address], MD 7/7/2021 1001        Implants:  * No implants in log *      Drains: * No LDAs found *    Findings: multiple polyps in colon and rectum; diverticulosis; fair prep.     Electronically signed by Amber Parham MD on 7/7/2021 at 10:50 AM

## 2021-07-07 NOTE — ANESTHESIA PRE PROCEDURE
Department of Anesthesiology  Preprocedure Note       Name:  Zoë Clark   Age:  76 y.o.  :  1945                                          MRN:  [id number]         Date:  2021      Surgeon: Brooklynn Noguera):  Doreen Medina MD    Procedure: Procedure(s):  COLORECTAL CANCER SCREENING, NOT HIGH RISK    Medications prior to admission:   Prior to Admission medications    Medication Sig Start Date End Date Taking?  Authorizing Provider   insulin glargine (LANTUS) 100 UNIT/ML injection vial Inject 30 Units into the skin nightly   Yes Historical Provider, MD   calcitRIOL (ROCALTROL) 0.25 MCG capsule Take 1 capsule by mouth daily 19  Yes Lazarus Larsson, MD   doxazosin (CARDURA) 8 MG tablet Take 4 mg by mouth 2 times daily  19  Yes Historical Provider, MD   torsemide (DEMADEX) 20 MG tablet Take 100 mg by mouth every morning Takes 5 tabs (=100mg) once daily 5/10/19  Yes Historical Provider, MD   carvedilol (COREG) 25 MG tablet Take 1 tablet by mouth 2 times daily (with meals) 10/30/18  Yes Jeniffer Tipton MD   albuterol-ipratropium (COMBIVENT RESPIMAT)  MCG/ACT AERS inhaler Inhale 1 puff into the lungs daily as needed   Yes Historical Provider, MD   glipiZIDE (GLUCOTROL) 5 MG tablet Take 5 mg by mouth daily    Yes Historical Provider, MD   isosorbide mononitrate (IMDUR) 30 MG extended release tablet Take 30 mg by mouth daily   Yes Historical Provider, MD   DULoxetine (CYMBALTA) 30 MG extended release capsule Take 30 mg by mouth daily   Yes Historical Provider, MD   ferrous sulfate (FE TABS) 325 (65 FE) MG EC tablet Take 1 tablet by mouth daily (with breakfast) 16  Yes Colletta Flavors, MD   bacitracin zinc 500 UNIT/GM ointment Apply topically daily 10/30/19   Jeniffer Tipton MD   diclofenac sodium 1 % GEL Apply 2 g topically 2 times daily 10/30/19   Jeniffer Tipton MD   spironolactone (ALDACTONE) 25 MG tablet Take 25 mg by mouth daily    Historical Provider, MD   Elastic diabetes mellitus ([facility]) E11.42    CKD (chronic kidney disease), stage III ([facility]) N18.30    Edema R60.9    CHF (congestive heart failure), NYHA class I, acute on chronic, combined ([facility]) I50.43    Acute on chronic diastolic congestive heart failure ([facility]) I50.33    Cellulitis of both lower extremities L03.115, L03. 116    Acute on chronic diastolic (congestive) heart failure ([facility]) I50.33    Acute kidney injury superimposed on chronic kidney disease ([facility]) N17.9, N18.9    Type 2 diabetes mellitus ([facility]) E11.9    Morbid obesity with BMI of 40.0-44.9, adult ([facility]) E66.01, Z68.41    Scleroderma ([facility]) M34.9    Acute on chronic diastolic heart failure ([facility]) I50.33    CKD (chronic kidney disease) N18.9    Anemia due to chronic kidney disease N18.9, D63.1    Morbid obesity ([facility]) E66.01    Interstitial lung disease ([facility]) J84.9    Syncope and collapse R55    Uncontrolled hypertension I10    E. coli UTI (urinary tract infection) N39.0, B96.20    Normocytic normochromic anemia D64.9    Cognitive impairment R41.89    Quadriceps tendonitis D27.657    Metabolic encephalopathy C68.84       Past Medical History:        Diagnosis Date    Arthritis     right knee    Cerebral artery occlusion with cerebral infarction ([facility] [address] 02/2017    weakness in left hand    CHF (congestive heart failure) ([facility])     CKD (chronic kidney disease) stage 3, GFR 30-59 ml/min ([facility])     COPD (chronic obstructive pulmonary disease) ([facility])     Depression (emotion) 2/13/2016    Diabetes mellitus ([address]     Diabetic neuropathy ([address] 10/23/2014    DM type 2 (diabetes mellitus, type 2) ([address]     Edema     Hx MRSA infection resolved 8/2017    2 negative nasal screens - 8/2017 (hx in blood & CSF - 2016)    Hyperlipidemia     Hypertension     LAD stenosis 10/28/2014    Pneumonia many years ago    Sleep apnea     no machine    SOB (shortness of breath)     Type 2 diabetes mellitus with proteinuria or albuminuria 10/23/2014       Past Surgical History:        Procedure Laterality Date    AORTIC VALVE REPLACEMENT  2015    CARDIAC CATHETERIZATION  2014    TONSILLECTOMY         Social History:    Social History     Tobacco Use    Smoking status: Former Smoker     Packs/day: 1.00     Years: 47.00     Pack years: 47.00     Types: Cigarettes     Quit date: 2009     Years since quittin.4    Smokeless tobacco: Never Used    Tobacco comment: Quit smoking 6 years ago/ Started smoking at 25   Substance Use Topics    Alcohol use: No     Alcohol/week: 2.0 standard drinks     Types: 2 Cans of beer per week     Comment: 2 beers once a month                                Counseling given: Not Answered  Comment: Quit smoking 6 years ago/ Started smoking at 18      Vital Signs (Current):   Vitals:    21 0654 21 0656 21 0711   BP:  (!) 153/92    Pulse:  81 75   Resp:  20    Temp:  96.2 °F (35.7 °C)    TempSrc:  Temporal    SpO2:  (!) 89% 97%   Weight: 243 lb (110.2 kg)     Height: 5' 5\" (1.651 m)                                                BP Readings from Last 3 Encounters:   21 (!) 153/92   19 (!) 148/70   10/31/19 (!) 141/52       NPO Status: Time of last liquid consumption:                         Time of last solid consumption:                         Date of last liquid consumption: 21                        Date of last solid food consumption: 21    BMI:   Wt Readings from Last 3 Encounters:   21 243 lb (110.2 kg)   19 235 lb 3.2 oz (106.7 kg)   10/31/19 249 lb (112.9 kg)     Body mass index is 40.44 kg/m².     CBC:   Lab Results   Component Value Date    WBC 6.15 2019    RBC 3.75 2019    RBC 4.04 01/15/2019    HGB 9.6 2019    HCT 33.6 2019    MCV 87.3 10/25/2019    RDW 15.7 10/25/2019     2019       CMP:   Lab Results   Component Value Date     2020    K 3.9 2020     2020    CO2 27 2020    BUN 26 01/07/2020    CREATININE 1.85 01/07/2020    GFRAA 27 10/31/2019    LABGLOM 32 01/07/2020    LABGLOM 23 10/31/2019    GLUCOSE 83 01/07/2020    GLUCOSE 126 04/15/2019    PROT 8.1 10/14/2019    PROT 6.4 11/20/2018    CALCIUM 10.1 01/07/2020    BILITOT 0.4 11/04/2019    ALKPHOS 70 11/04/2019    AST 25 11/04/2019    ALT 22 11/04/2019       POC Tests: No results for input(s): POCGLU, POCNA, POCK, POCCL, POCBUN, POCHEMO, POCHCT in the last 72 hours. Coags:   Lab Results   Component Value Date    PROTIME 11.4 12/15/2018    INR 1.1 12/15/2018    APTT >120.0 12/17/2018       HCG (If Applicable): No results found for: PREGTESTUR, PREGSERUM, HCG, HCGQUANT     ABGs:   Lab Results   Component Value Date    PHART 7.400 10/27/2014    PO2ART 72.0 10/27/2014    WVS1EUZ 39.0 10/27/2014    AEX1NFC 23.7 10/27/2014    C8IVTXDK 94.1 10/27/2014        Type & Screen (If Applicable):  No results found for: LABABO, LABRH    Drug/Infectious Status (If Applicable):  No results found for: HIV, HEPCAB    COVID-19 Screening (If Applicable): No results found for: COVID19        Anesthesia Evaluation    Airway: Mallampati: I  TM distance: >3 FB   Neck ROM: full  Mouth opening: > = 3 FB Dental:          Pulmonary:   (+) shortness of breath: chronic and no interval change,  sleep apnea:                             Cardiovascular:    (+) valvular problems/murmurs: AS, CAD:, CHF:, murmur,     (-)  angina      Rhythm: regular                      Neuro/Psych:   (+) CVA: no interval change,             GI/Hepatic/Renal:             Endo/Other:    (+) Diabetes, . Abdominal:             Vascular: Other Findings:             Anesthesia Plan      MAC     ASA 4             Anesthetic plan and risks discussed with patient.                       Eder Husain MD   7/7/2021

## 2021-07-07 NOTE — OP NOTE
[address] [facility] [address]                [address] [facility], [address] [place] [address]                                OPERATIVE REPORT    PATIENT NAME: Nena Painting                :        1945  MED REC NO:   [id number]                             ROOM:  ACCOUNT NO:   [de-identified]                           ADMIT DATE: 2021  PROVIDER:     Analy Trejo    DATE OF PROCEDURE:  2021    PREPROCEDURE DIAGNOSIS:  Screening colonoscopy. POSTPROCEDURE DIAGNOSES:  Multiple colon polyps, diverticulosis, and a  fair colon prep. PROCEDURES PERFORMED:  Colonoscopy to the cecum with random cecal  biopsies, cecal polypectomies x2 plus removal of six other colon polyps  scattered throughout the large bowel in the transverse colon, descending  colon and rectum. A total of eight polypectomies was performed,  tattooing of the bowel at the side of the largest polypectomy performed  using a loop snare with multiple random biopsies taken from the cecum. ENDOSCOPIST:  Analy Haji MD    ANESTHESIA:  MAC.    INDICATIONS:  This is a 60-year-old -American lady who has never  had a colonoscopy performed. She has been complaining of having  different kind of pain and discomfort in her abdomen from time to time  often on the left side. She was brought in for colonoscopy. FINDINGS AND DESCRIPTION OF PROCEDURE:  After she was given deep  sedation with propofol, we were able to position her eventually on her  back and pass the flexible variable stiffness scope through the cecum,  identified by the appendiceal orifice and the ileocecal valve. There  were some polypoid abnormalities seen in the cecum. These polyps were  removed independently using a hot biopsy snare technique.   After the  polyps were removed from the cecum and random cecal biopsies were taken  and careful inspection of the bowel wall revealed there were  diverticulosis scattered throughout the large

## 2021-07-08 LAB — SURGICAL PATHOLOGY REPORT: NORMAL

## 2021-07-22 ENCOUNTER — HOSPITAL ENCOUNTER (OUTPATIENT)
Dept: MAMMOGRAPHY | Age: 76
Discharge: HOME | End: 2021-07-24
Payer: MEDICARE

## 2021-07-22 DIAGNOSIS — Z12.31 BREAST CANCER SCREENING BY MAMMOGRAM: ICD-10-CM

## 2021-07-22 PROCEDURE — 77063 BREAST TOMOSYNTHESIS BI: CPT

## 2021-10-28 ENCOUNTER — HOSPITAL ENCOUNTER (OUTPATIENT)
Age: 76
Discharge: HOME | End: 2021-10-28
Payer: MEDICARE

## 2021-10-28 LAB
ABSOLUTE EOS #: 0.23 K/UL (ref 0–0.44)
ABSOLUTE IMMATURE GRANULOCYTE: <0.03 K/UL (ref 0–0.3)
ABSOLUTE LYMPH #: 2.25 K/UL (ref 1.1–3.7)
ABSOLUTE MONO #: 0.61 K/UL (ref 0.1–1.2)
ALBUMIN SERPL-MCNC: 4.3 G/DL (ref 3.5–5.2)
ALBUMIN/GLOBULIN RATIO: 1.1 (ref 1–2.5)
ALP BLD-CCNC: 104 U/L (ref 35–104)
ALT SERPL-CCNC: 20 U/L (ref 5–33)
ANION GAP SERPL CALCULATED.3IONS-SCNC: 17 MMOL/L (ref 9–17)
AST SERPL-CCNC: 24 U/L
BASOPHILS # BLD: 1 % (ref 0–2)
BASOPHILS ABSOLUTE: 0.05 K/UL (ref 0–0.2)
BILIRUB SERPL-MCNC: 0.69 MG/DL (ref 0.3–1.2)
BUN BLDV-MCNC: 58 MG/DL (ref 8–23)
BUN/CREAT BLD: ABNORMAL (ref 9–20)
CALCIUM SERPL-MCNC: 9.9 MG/DL (ref 8.6–10.4)
CHLORIDE BLD-SCNC: 105 MMOL/L (ref 98–107)
CHOLESTEROL, FASTING: 126 MG/DL
CHOLESTEROL/HDL RATIO: 2.2
CO2: 20 MMOL/L (ref 20–31)
CREAT SERPL-MCNC: 2.2 MG/DL (ref 0.5–0.9)
DIFFERENTIAL TYPE: ABNORMAL
EOSINOPHILS RELATIVE PERCENT: 3 % (ref 1–4)
GFR AFRICAN AMERICAN: 26 ML/MIN
GFR NON-AFRICAN AMERICAN: 22 ML/MIN
GFR SERPL CREATININE-BSD FRML MDRD: ABNORMAL ML/MIN/{1.73_M2}
GFR SERPL CREATININE-BSD FRML MDRD: ABNORMAL ML/MIN/{1.73_M2}
GLUCOSE FASTING: 111 MG/DL (ref 70–99)
HCT VFR BLD CALC: 38 % (ref 36.3–47.1)
HDLC SERPL-MCNC: 58 MG/DL
HEMOGLOBIN: 11.6 G/DL (ref 11.9–15.1)
IMMATURE GRANULOCYTES: 0 %
LDL CHOLESTEROL: 51 MG/DL (ref 0–130)
LYMPHOCYTES # BLD: 34 % (ref 24–43)
MCH RBC QN AUTO: 28.6 PG (ref 25.2–33.5)
MCHC RBC AUTO-ENTMCNC: 30.5 G/DL (ref 28.4–34.8)
MCV RBC AUTO: 93.6 FL (ref 82.6–102.9)
MONOCYTES # BLD: 9 % (ref 3–12)
NRBC AUTOMATED: 0 PER 100 WBC
PDW BLD-RTO: 12.8 % (ref 11.8–14.4)
PLATELET # BLD: 142 K/UL (ref 138–453)
PLATELET ESTIMATE: ABNORMAL
PMV BLD AUTO: 10.9 FL (ref 8.1–13.5)
POTASSIUM SERPL-SCNC: 4.9 MMOL/L (ref 3.7–5.3)
RBC # BLD: 4.06 M/UL (ref 3.95–5.11)
RBC # BLD: ABNORMAL 10*6/UL
SEG NEUTROPHILS: 53 % (ref 36–65)
SEGMENTED NEUTROPHILS ABSOLUTE COUNT: 3.53 K/UL (ref 1.5–8.1)
SODIUM BLD-SCNC: 142 MMOL/L (ref 135–144)
T3 FREE: 2.67 PG/ML (ref 2.02–4.43)
THYROXINE, FREE: 1.12 NG/DL (ref 0.93–1.7)
TOTAL CK: 174 U/L (ref 26–192)
TOTAL PROTEIN: 8.2 G/DL (ref 6.4–8.3)
TRIGLYCERIDE, FASTING: 84 MG/DL
TSH SERPL DL<=0.05 MIU/L-ACNC: 2.7 MIU/L (ref 0.3–5)
VLDLC SERPL CALC-MCNC: NORMAL MG/DL (ref 1–30)
WBC # BLD: 6.7 K/UL (ref 3.5–11.3)
WBC # BLD: ABNORMAL 10*3/UL

## 2021-10-28 PROCEDURE — 85025 COMPLETE CBC W/AUTO DIFF WBC: CPT

## 2021-10-28 PROCEDURE — 36415 COLL VENOUS BLD VENIPUNCTURE: CPT

## 2021-10-28 PROCEDURE — 84443 ASSAY THYROID STIM HORMONE: CPT

## 2021-10-28 PROCEDURE — 82550 ASSAY OF CK (CPK): CPT

## 2021-10-28 PROCEDURE — 80061 LIPID PANEL: CPT

## 2021-10-28 PROCEDURE — 84481 FREE ASSAY (FT-3): CPT

## 2021-10-28 PROCEDURE — 84439 ASSAY OF FREE THYROXINE: CPT

## 2021-10-28 PROCEDURE — 80053 COMPREHEN METABOLIC PANEL: CPT

## 2021-11-29 ENCOUNTER — HOSPITAL ENCOUNTER (OUTPATIENT)
Age: 76
Discharge: HOME | End: 2021-11-29
Payer: MEDICARE

## 2021-11-29 LAB
ANION GAP SERPL CALCULATED.3IONS-SCNC: 17 MMOL/L (ref 9–17)
BUN BLDV-MCNC: 57 MG/DL (ref 8–23)
BUN/CREAT BLD: ABNORMAL (ref 9–20)
CALCIUM SERPL-MCNC: 9.5 MG/DL (ref 8.6–10.4)
CHLORIDE BLD-SCNC: 99 MMOL/L (ref 98–107)
CO2: 23 MMOL/L (ref 20–31)
CREAT SERPL-MCNC: 2.94 MG/DL (ref 0.5–0.9)
GFR AFRICAN AMERICAN: 19 ML/MIN
GFR NON-AFRICAN AMERICAN: 16 ML/MIN
GFR SERPL CREATININE-BSD FRML MDRD: ABNORMAL ML/MIN/{1.73_M2}
GFR SERPL CREATININE-BSD FRML MDRD: ABNORMAL ML/MIN/{1.73_M2}
GLUCOSE BLD-MCNC: 80 MG/DL (ref 70–99)
POTASSIUM SERPL-SCNC: 4.9 MMOL/L (ref 3.7–5.3)
SODIUM BLD-SCNC: 139 MMOL/L (ref 135–144)

## 2021-11-29 PROCEDURE — 80048 BASIC METABOLIC PNL TOTAL CA: CPT

## 2021-11-29 PROCEDURE — 36415 COLL VENOUS BLD VENIPUNCTURE: CPT

## (undated) DEVICE — TRAP SURG QUAD PARABOLA SLOT DSGN SFTY SCRN TRAPEASE

## (undated) DEVICE — THE CARR-LOCKE INJECTION NEEDLE IS A SINGLE USE, DISPOSABLE, FLEXIBLE SHEATH INJECTION NEEDLE USED FOR THE INJECTION OF VARIOUS TYPES OF MEDIA THROUGH FLEXIBLE ENDOSCOPES.

## (undated) DEVICE — Device: Brand: DEFENDO VALVE AND CONNECTOR KIT

## (undated) DEVICE — MEDICINE CUP, GRADUATED, STER: Brand: MEDLINE

## (undated) DEVICE — BASIN EMSIS 700ML GRAPHITE PLAS KID SHP GRAD

## (undated) DEVICE — CO2 CANNULA,SUPERSOFT, ADLT,7'O2,7'CO2: Brand: MEDLINE

## (undated) DEVICE — SYRINGE MED 50ML LUERLOCK TIP

## (undated) DEVICE — FORCEPS BX L L240CM DIA2.4MM RAD JAW 4 HOT FOR POLYP DISP

## (undated) DEVICE — TUBING, SUCTION, 1/4" X 12', STRAIGHT: Brand: MEDLINE

## (undated) DEVICE — Device: Brand: SPOT EX ENDOSCOPIC TATTOO

## (undated) DEVICE — ADAPTER TBNG LUER STUB 15 GA INTMED

## (undated) DEVICE — SNARE ENDOSCP POLYP MED STD AD 2.4X27X240 CM 2.8 MM OVL SENS

## (undated) DEVICE — GOWN,AURORA,NONREINFORCED,LARGE: Brand: MEDLINE

## (undated) DEVICE — SINGLE-USE BIOPSY FORCEPS: Brand: RADIAL JAW 4